# Patient Record
Sex: FEMALE | Race: WHITE | NOT HISPANIC OR LATINO | URBAN - METROPOLITAN AREA
[De-identification: names, ages, dates, MRNs, and addresses within clinical notes are randomized per-mention and may not be internally consistent; named-entity substitution may affect disease eponyms.]

---

## 2017-03-25 ENCOUNTER — EMERGENCY (EMERGENCY)
Facility: HOSPITAL | Age: 82
LOS: 1 days | Discharge: HOME | End: 2017-03-25
Admitting: EMERGENCY MEDICINE
Payer: MEDICARE

## 2017-03-25 VITALS
DIASTOLIC BLOOD PRESSURE: 93 MMHG | OXYGEN SATURATION: 100 % | RESPIRATION RATE: 18 BRPM | SYSTOLIC BLOOD PRESSURE: 154 MMHG | TEMPERATURE: 98 F | HEART RATE: 82 BPM

## 2017-03-25 PROBLEM — Z00.00 ENCOUNTER FOR PREVENTIVE HEALTH EXAMINATION: Status: ACTIVE | Noted: 2017-03-25

## 2017-03-25 PROCEDURE — 93971 EXTREMITY STUDY: CPT | Mod: 26,LT

## 2017-03-25 PROCEDURE — 73562 X-RAY EXAM OF KNEE 3: CPT | Mod: 26,LT

## 2017-03-25 PROCEDURE — 99284 EMERGENCY DEPT VISIT MOD MDM: CPT

## 2017-03-25 RX ORDER — ACETAMINOPHEN 500 MG
650 TABLET ORAL ONCE
Qty: 0 | Refills: 0 | Status: COMPLETED | OUTPATIENT
Start: 2017-03-25 | End: 2017-03-25

## 2017-03-25 RX ADMIN — Medication 650 MILLIGRAM(S): at 09:18

## 2017-03-25 NOTE — ED PROVIDER NOTE - CHPI ED SYMPTOMS NEG
no vomiting/no loss of consciousness/no nausea/Denies LOC, abd pain, dizziness, CP, and other complaints Denies heavy lifting, trauma, SOB, chills, fevers, recent travel, and other complaints

## 2017-03-25 NOTE — ED ADULT TRIAGE NOTE - CHIEF COMPLAINT QUOTE
Co left knee pain and swelling since last night. Denies falls or trauma. Unable to bear weight on leg. On Eliquis

## 2017-03-25 NOTE — ED PROVIDER NOTE - MEDICAL DECISION MAKING DETAILS
84 y/o F s/p slip and fall here w/ head injury and x6cm lac to scalp. Plan - CT head and C-spine, lac repair. atraumatic left knee pain/ swelling: xray r/o fracture or US r/o blood clot.

## 2017-03-25 NOTE — ED PROVIDER NOTE - NS ED MD SCRIBE ATTENDING SCRIBE SECTIONS
VITAL SIGNS( Pullset)/DISPOSITION/HISTORY OF PRESENT ILLNESS/PAST MEDICAL/SURGICAL/SOCIAL HISTORY/HIV/REVIEW OF SYSTEMS PAST MEDICAL/SURGICAL/SOCIAL HISTORY/DISPOSITION PAST MEDICAL/SURGICAL/SOCIAL HISTORY/VITAL SIGNS( Pullset)/DISPOSITION/HISTORY OF PRESENT ILLNESS/HIV/REVIEW OF SYSTEMS/PHYSICAL EXAM

## 2017-03-25 NOTE — ED PROVIDER NOTE - LOWER EXTREMITY EXAM, LEFT
SWELLING/LIMITED ROM/positive swelling and tenderness to left lateral area of knee, slight pain on ROM, positive 2+ distal pulses, less than 2 sec cap refill, 5/5 strength. ambulating with difficulty. no signs of infection, no redness, no warmth, positive swelling and tenderness to left lateral area of knee, slight pain on ROM, positive 2+ distal pulses, less than 2 sec cap refill, 5/5 strength. ambulating with difficulty./LIMITED ROM/SWELLING

## 2017-03-25 NOTE — ED PROVIDER NOTE - OBJECTIVE STATEMENT
84 y/o F, w/ DM, HTN, Osteoporosis, presents to ED s/p slip and fall today. Reports her R knee felt weak and gave out causing the fall but notes chronic R knee pain. Endorses head trauma when she fell. States she hit her head against a dresser during the fall. Has lac on her scalp presently. Pt ambulating normally s/p fall. Denies LOC, abd pain, dizziness, CP, nausea, vomiting, and other complaints. NKDA. Regular meds include HTN med and DM med. Tetanus status unknown. 82 y/o F, w/ PMHx of CAD, HTN, HLD, Angioplasty, presents to ED c/o L knee pain and swelling worsening since yesterday night. This AM she noticed she could not straighten the leg. Reports she is having difficulty ambulating secondary to pain/swelling. Recalls no mechanisms of injury. Denies heavy lifting, trauma, SOB, chills, fevers, recent travel, and other complaints. Meds include Eliquis, Lisinopril, and Metoprolol. NKDA. 82 y/o F, w/ PMHx of CAD, HTN, HLD, Angioplasty, presents to ED c/o L knee pain and swelling worsening since last night. This AM she noticed she could not straighten the leg. Reports she is having difficulty ambulating secondary to pain/swelling. Recalls no mechanisms of injury. Denies heavy lifting, trauma, SOB, chills, fevers, recent travel, and other complaints. Meds include Eliquis, Lisinopril, and Metoprolol. NKDA.

## 2017-03-25 NOTE — ED PROVIDER NOTE - PROGRESS NOTE DETAILS
The scribe's documentation has been prepared under my direction and personally reviewed by me in its entirety. I confirm that the note above accurately reflects all work, treatment, procedures, and medical decision making performed by me. PALOMA Jarquin us and xray showing small effusion, no other acute findings. Pt stable for discharge and to follow up with pmd.

## 2017-03-25 NOTE — ED PROVIDER NOTE - CHIEF COMPLAINT
The patient is a 83y Female complaining of head trauma. The patient is a 83y Female complaining of The patient is a 83y Female complaining of L knee pain.

## 2017-03-25 NOTE — ED PROCEDURE NOTE - NS ED PERI VASCULAR NEG
fingers/toes warm to touch/no cyanosis of extremity/no swelling/capillary refill time < 2 seconds/no paresthesia

## 2017-04-12 ENCOUNTER — INPATIENT (INPATIENT)
Facility: HOSPITAL | Age: 82
LOS: 1 days | Discharge: HOME HEALTH | End: 2017-04-14
Attending: GENERAL PRACTICE | Admitting: GENERAL PRACTICE
Payer: MEDICARE

## 2017-04-12 VITALS
OXYGEN SATURATION: 87 % | TEMPERATURE: 99 F | HEART RATE: 74 BPM | DIASTOLIC BLOOD PRESSURE: 87 MMHG | RESPIRATION RATE: 20 BRPM | SYSTOLIC BLOOD PRESSURE: 116 MMHG

## 2017-04-12 DIAGNOSIS — I25.10 ATHEROSCLEROTIC HEART DISEASE OF NATIVE CORONARY ARTERY WITHOUT ANGINA PECTORIS: ICD-10-CM

## 2017-04-12 DIAGNOSIS — I48.91 UNSPECIFIED ATRIAL FIBRILLATION: ICD-10-CM

## 2017-04-12 DIAGNOSIS — I10 ESSENTIAL (PRIMARY) HYPERTENSION: ICD-10-CM

## 2017-04-12 DIAGNOSIS — Z29.9 ENCOUNTER FOR PROPHYLACTIC MEASURES, UNSPECIFIED: ICD-10-CM

## 2017-04-12 DIAGNOSIS — R55 SYNCOPE AND COLLAPSE: ICD-10-CM

## 2017-04-12 DIAGNOSIS — E78.5 HYPERLIPIDEMIA, UNSPECIFIED: ICD-10-CM

## 2017-04-12 LAB
ALBUMIN SERPL ELPH-MCNC: 3.3 G/DL — SIGNIFICANT CHANGE UP (ref 3.3–5)
ALP SERPL-CCNC: 69 U/L — SIGNIFICANT CHANGE UP (ref 40–120)
ALT FLD-CCNC: 14 U/L — SIGNIFICANT CHANGE UP (ref 4–33)
APTT BLD: 33.5 SEC — SIGNIFICANT CHANGE UP (ref 27.5–37.4)
AST SERPL-CCNC: 23 U/L — SIGNIFICANT CHANGE UP (ref 4–32)
BASOPHILS # BLD AUTO: 0.02 K/UL — SIGNIFICANT CHANGE UP (ref 0–0.2)
BASOPHILS NFR BLD AUTO: 0.3 % — SIGNIFICANT CHANGE UP (ref 0–2)
BILIRUB SERPL-MCNC: 0.7 MG/DL — SIGNIFICANT CHANGE UP (ref 0.2–1.2)
BUN SERPL-MCNC: 23 MG/DL — SIGNIFICANT CHANGE UP (ref 7–23)
CALCIUM SERPL-MCNC: 8.7 MG/DL — SIGNIFICANT CHANGE UP (ref 8.4–10.5)
CHLORIDE SERPL-SCNC: 105 MMOL/L — SIGNIFICANT CHANGE UP (ref 98–107)
CK MB BLD-MCNC: 1.53 NG/ML — SIGNIFICANT CHANGE UP (ref 1–4.7)
CK SERPL-CCNC: 26 U/L — SIGNIFICANT CHANGE UP (ref 25–170)
CK SERPL-CCNC: 28 U/L — SIGNIFICANT CHANGE UP (ref 25–170)
CO2 SERPL-SCNC: 22 MMOL/L — SIGNIFICANT CHANGE UP (ref 22–31)
CREAT SERPL-MCNC: 1.05 MG/DL — SIGNIFICANT CHANGE UP (ref 0.5–1.3)
EOSINOPHIL # BLD AUTO: 0.06 K/UL — SIGNIFICANT CHANGE UP (ref 0–0.5)
EOSINOPHIL NFR BLD AUTO: 0.8 % — SIGNIFICANT CHANGE UP (ref 0–6)
GLUCOSE SERPL-MCNC: 119 MG/DL — HIGH (ref 70–99)
HCT VFR BLD CALC: 38.1 % — SIGNIFICANT CHANGE UP (ref 34.5–45)
HCT VFR BLD CALC: 38.7 % — SIGNIFICANT CHANGE UP (ref 34.5–45)
HGB BLD-MCNC: 11.4 G/DL — LOW (ref 11.5–15.5)
HGB BLD-MCNC: 11.5 G/DL — SIGNIFICANT CHANGE UP (ref 11.5–15.5)
IMM GRANULOCYTES NFR BLD AUTO: 0.3 % — SIGNIFICANT CHANGE UP (ref 0–1.5)
INR BLD: 1.24 — HIGH (ref 0.88–1.17)
LYMPHOCYTES # BLD AUTO: 1.24 K/UL — SIGNIFICANT CHANGE UP (ref 1–3.3)
LYMPHOCYTES # BLD AUTO: 16.1 % — SIGNIFICANT CHANGE UP (ref 13–44)
MCHC RBC-ENTMCNC: 22.5 PG — LOW (ref 27–34)
MCHC RBC-ENTMCNC: 22.9 PG — LOW (ref 27–34)
MCHC RBC-ENTMCNC: 29.7 % — LOW (ref 32–36)
MCHC RBC-ENTMCNC: 29.9 % — LOW (ref 32–36)
MCV RBC AUTO: 75.9 FL — LOW (ref 80–100)
MCV RBC AUTO: 76.5 FL — LOW (ref 80–100)
MONOCYTES # BLD AUTO: 0.91 K/UL — HIGH (ref 0–0.9)
MONOCYTES NFR BLD AUTO: 11.8 % — SIGNIFICANT CHANGE UP (ref 2–14)
NEUTROPHILS # BLD AUTO: 5.43 K/UL — SIGNIFICANT CHANGE UP (ref 1.8–7.4)
NEUTROPHILS NFR BLD AUTO: 70.7 % — SIGNIFICANT CHANGE UP (ref 43–77)
OB PNL STL: NEGATIVE — SIGNIFICANT CHANGE UP
PLATELET # BLD AUTO: 219 K/UL — SIGNIFICANT CHANGE UP (ref 150–400)
PLATELET # BLD AUTO: 234 K/UL — SIGNIFICANT CHANGE UP (ref 150–400)
PMV BLD: 10.2 FL — SIGNIFICANT CHANGE UP (ref 7–13)
PMV BLD: 10.7 FL — SIGNIFICANT CHANGE UP (ref 7–13)
POTASSIUM SERPL-MCNC: 4 MMOL/L — SIGNIFICANT CHANGE UP (ref 3.5–5.3)
POTASSIUM SERPL-SCNC: 4 MMOL/L — SIGNIFICANT CHANGE UP (ref 3.5–5.3)
PROT SERPL-MCNC: 6.5 G/DL — SIGNIFICANT CHANGE UP (ref 6–8.3)
PROTHROM AB SERPL-ACNC: 14 SEC — HIGH (ref 9.8–13.1)
RBC # BLD: 4.98 M/UL — SIGNIFICANT CHANGE UP (ref 3.8–5.2)
RBC # BLD: 5.1 M/UL — SIGNIFICANT CHANGE UP (ref 3.8–5.2)
RBC # FLD: 21.3 % — HIGH (ref 10.3–14.5)
RBC # FLD: 21.6 % — HIGH (ref 10.3–14.5)
SODIUM SERPL-SCNC: 141 MMOL/L — SIGNIFICANT CHANGE UP (ref 135–145)
TROPONIN T SERPL-MCNC: < 0.06 NG/ML — SIGNIFICANT CHANGE UP (ref 0–0.06)
TROPONIN T SERPL-MCNC: < 0.06 NG/ML — SIGNIFICANT CHANGE UP (ref 0–0.06)
WBC # BLD: 7.68 K/UL — SIGNIFICANT CHANGE UP (ref 3.8–10.5)
WBC # BLD: 9.69 K/UL — SIGNIFICANT CHANGE UP (ref 3.8–10.5)
WBC # FLD AUTO: 7.68 K/UL — SIGNIFICANT CHANGE UP (ref 3.8–10.5)
WBC # FLD AUTO: 9.69 K/UL — SIGNIFICANT CHANGE UP (ref 3.8–10.5)

## 2017-04-12 PROCEDURE — 71010: CPT | Mod: 26

## 2017-04-12 PROCEDURE — 72125 CT NECK SPINE W/O DYE: CPT | Mod: 26

## 2017-04-12 PROCEDURE — 70450 CT HEAD/BRAIN W/O DYE: CPT | Mod: 26

## 2017-04-12 RX ORDER — FERROUS SULFATE 325(65) MG
325 TABLET ORAL DAILY
Qty: 0 | Refills: 0 | Status: DISCONTINUED | OUTPATIENT
Start: 2017-04-12 | End: 2017-04-14

## 2017-04-12 RX ORDER — METOPROLOL TARTRATE 50 MG
50 TABLET ORAL DAILY
Qty: 0 | Refills: 0 | Status: DISCONTINUED | OUTPATIENT
Start: 2017-04-12 | End: 2017-04-14

## 2017-04-12 RX ORDER — TETANUS TOXOID, REDUCED DIPHTHERIA TOXOID AND ACELLULAR PERTUSSIS VACCINE, ADSORBED 5; 2.5; 8; 8; 2.5 [IU]/.5ML; [IU]/.5ML; UG/.5ML; UG/.5ML; UG/.5ML
0.5 SUSPENSION INTRAMUSCULAR ONCE
Qty: 0 | Refills: 0 | Status: COMPLETED | OUTPATIENT
Start: 2017-04-12 | End: 2017-04-12

## 2017-04-12 RX ORDER — LISINOPRIL 2.5 MG/1
20 TABLET ORAL DAILY
Qty: 0 | Refills: 0 | Status: DISCONTINUED | OUTPATIENT
Start: 2017-04-12 | End: 2017-04-14

## 2017-04-12 RX ORDER — DILTIAZEM HCL 120 MG
180 CAPSULE, EXT RELEASE 24 HR ORAL DAILY
Qty: 0 | Refills: 0 | Status: DISCONTINUED | OUTPATIENT
Start: 2017-04-12 | End: 2017-04-14

## 2017-04-12 RX ORDER — PANTOPRAZOLE SODIUM 20 MG/1
40 TABLET, DELAYED RELEASE ORAL
Qty: 0 | Refills: 0 | Status: DISCONTINUED | OUTPATIENT
Start: 2017-04-12 | End: 2017-04-14

## 2017-04-12 RX ORDER — DOCUSATE SODIUM 100 MG
100 CAPSULE ORAL THREE TIMES A DAY
Qty: 0 | Refills: 0 | Status: DISCONTINUED | OUTPATIENT
Start: 2017-04-12 | End: 2017-04-14

## 2017-04-12 RX ORDER — APIXABAN 2.5 MG/1
2.5 TABLET, FILM COATED ORAL
Qty: 0 | Refills: 0 | Status: DISCONTINUED | OUTPATIENT
Start: 2017-04-12 | End: 2017-04-14

## 2017-04-12 RX ADMIN — PANTOPRAZOLE SODIUM 40 MILLIGRAM(S): 20 TABLET, DELAYED RELEASE ORAL at 15:16

## 2017-04-12 RX ADMIN — Medication 325 MILLIGRAM(S): at 15:17

## 2017-04-12 RX ADMIN — APIXABAN 2.5 MILLIGRAM(S): 2.5 TABLET, FILM COATED ORAL at 15:16

## 2017-04-12 RX ADMIN — TETANUS TOXOID, REDUCED DIPHTHERIA TOXOID AND ACELLULAR PERTUSSIS VACCINE, ADSORBED 0.5 MILLILITER(S): 5; 2.5; 8; 8; 2.5 SUSPENSION INTRAMUSCULAR at 10:30

## 2017-04-12 RX ADMIN — Medication 50 MILLIGRAM(S): at 15:17

## 2017-04-12 RX ADMIN — Medication 180 MILLIGRAM(S): at 15:17

## 2017-04-12 NOTE — H&P ADULT. - HISTORY OF PRESENT ILLNESS
84 y/o F with PMHx HTN, HLD, Afib on Eloquis, CAD s/p 1 stent presents with dizziness and fall last night. She was sitting down watching TV when she suddenly started feeling dizzy, perioral and b/l hand numbness. She decided to get up and walk to fight it off, but wound up falling and hitting her head and left leg on furniture. She was able to stand up and call 911 after the episode. Pt had a right orbital headache characterized as a dull-ache and 5/10 severity after the fall, has resolved now. Pt also reports blurry vision currently and during the episode. Pt states that she also has new onset of dyspnea and chest tightness 5/10 on exertion when she goes up a flight of stair and does simple chores in her house that has been occuring for one month. Pt states that these symptoms resolve with rest and they were not the cause of her dizziness and fall. Pt currently denies LOC, syncope, facial droop and numbness, fever, chills, headache, nausea, vomiting, and diarrhea. 84 y/o F with PMHx HTN, HLD, Afib on Eliquis, CAD s/p 1 stent presents with dizziness and fall last night. She was sitting down watching TV when she suddenly started feeling dizzy, with perioral and b/l hand numbness. She decided to get up and walk to fight it off, but wound up falling and hitting her head and left leg on furniture. She was able to stand up and call 911 after the episode. Pt had a right orbital headache characterized as a dull-ache and 5/10 severity after the fall, has resolved now. Pt also reports blurry vision currently and during the episode. Pt states that she also has new onset of dyspnea and chest tightness 5/10 on exertion when she goes up a flight of stair and does simple chores in her house that has been occuring for one month. Pt states that these symptoms resolve with rest and they were not the cause of her dizziness and fall. Pt currently denies LOC, syncope, facial droop and numbness, fever, chills, headache, nausea, vomiting, and diarrhea. 82 y/o F with PMHx HTN, HLD, Afib on Eliquis, CAD s/p 1 stent presents with dizziness and fall last night. She was sitting down watching TV when she suddenly started feeling dizzy, with perioral and b/l hand numbness.   She decided to get up and walk to fight it off, but wound up falling and hitting her head and left leg on furniture. She was able to stand up and call 911 after the episode. Pt had a right orbital headache characterized as a dull-ache and 5/10 severity after the fall, has resolved now.   Pt also reports blurry vision currently and during the episode. Pt states that she also has new onset of dyspnea and chest tightness 5/10 on exertion when she goes up a flight of stair and does simple chores in her house that has been occuring for one month. Pt states that these symptoms resolve with rest and they were not the cause of her dizziness and fall.   Pt currently denies LOC, syncope, facial droop and numbness, fever, chills, headache, nausea, vomiting, and diarrhea.

## 2017-04-12 NOTE — H&P ADULT. - ASSESSMENT
82 y/o F with PMHx HTN, HLD, Afib on Eloquis, CAD s/p 1 stent presents with dizziness and fall last night, admitted to tele r/o stroke r/o ACS 84 y/o F with PMHx HTN, HLD, Afib on Eloquis, CAD s/p 1 stent presents with dizziness and fall last night, admitted to tele for possible Syncopal episode, also c/o MCMILLAN & chest tightness x 1 month

## 2017-04-12 NOTE — ED PROVIDER NOTE - PROGRESS NOTE DETAILS
On reassessment pt noted an episode of facial/hand numbness/tingling just prior to the presyncope episode. Will consult neuro.

## 2017-04-12 NOTE — H&P ADULT. - NEGATIVE ENMT SYMPTOMS
no sinus symptoms/no nasal discharge/no hearing difficulty/no ear pain/no dysphagia/no tinnitus/no throat pain/no nasal congestion

## 2017-04-12 NOTE — H&P ADULT. - ATTENDING COMMENTS
Patient seen, examined, and interviewed by me, case discussed with me, chart reviewed, agree with above H/P as reviewed and edited by me.

## 2017-04-12 NOTE — ED ADULT TRIAGE NOTE - CHIEF COMPLAINT QUOTE
pt states "I am dizzy I fell in my home today I hit my head and hurt my left leg"  pt denies any loc abrasion noted to left leg pt history of a-fib on eloquis history of cardiac stents

## 2017-04-12 NOTE — H&P ADULT. - PMH
Atrial fibrillation    Coronary Artery Disease    Hyperlipemia    Hypertension    s/p Angioplasty with Stent

## 2017-04-12 NOTE — H&P ADULT. - MUSCULOSKELETAL
details… detailed exam no joint swelling/normal strength/no calf tenderness/no joint erythema/no joint warmth/ROM intact/normal

## 2017-04-12 NOTE — H&P ADULT. - GASTROINTESTINAL DETAILS
normal/no organomegaly/no masses palpable/bowel sounds normal/no distention/nontender/soft/no rigidity/no guarding/no rebound tenderness

## 2017-04-12 NOTE — ED PROVIDER NOTE - OBJECTIVE STATEMENT
84yo hx of htn, hld, afib on eloquis, cad s/p stent presents with lightheadednesss and fall today. She was ambulating in her house, felt lightheaded, tried to walk, got worsening lighteadedness and fell, hitting her head, and right leg. No loc, confusion or amnesia. She did have a headache since the fall. Her head hit a chair. No presyncope headache abd pain, chest pain or sob. She was ambulatory after the incident. 82yo hx of htn, hld, afib on eloquis, cad s/p stent presents with lightheadednesss and fall today. She was ambulating in her house, felt lightheaded, tried to walk, got worsening lighteadedness and fell, hitting her head, and right leg. No loc, confusion or amnesia. She did have a headache since the fall. Her head hit a chair. No presyncope headache abd pain, chest pain or sob. She was ambulatory after the incident. She notes mild anemia and started on Iron but reports dark stools. No associated abd pain. She notes a similar episode 1 week ago.

## 2017-04-12 NOTE — H&P ADULT. - NEUROLOGICAL DETAILS
normal strength/responds to pain/sensation intact/cranial nerves intact/no spontaneous movement/alert and oriented x 3/responds to verbal commands

## 2017-04-12 NOTE — H&P ADULT. - PROBLEM SELECTOR PLAN 1
Admit to Telemetry, serial CE's, EKG's, check orthostatics, echocardiogram. F/U MD note, consider Cardiology eval Admit to Telemetry, serial CE's, EKG's, check orthostatics, echocardiogram.    Cardiology eval ( Dr So contacted)  neuro eval (called by ED)

## 2017-04-12 NOTE — H&P ADULT. - RS GEN PE MLT RESP DETAILS PC
no chest wall tenderness/airway patent/no subcutaneous emphysema/clear to auscultation bilaterally/normal/no intercostal retractions/no rales/no wheezes/breath sounds equal/respirations non-labored/good air movement/no rhonchi

## 2017-04-12 NOTE — H&P ADULT. - NEGATIVE CARDIOVASCULAR SYMPTOMS
no palpitations/no orthopnea/no paroxysmal nocturnal dyspnea/no claudication/no chest pain/no peripheral edema

## 2017-04-12 NOTE — H&P ADULT. - NEGATIVE NEUROLOGICAL SYMPTOMS
no confusion/no focal seizures/no syncope/no generalized seizures/no transient paralysis/no weakness/no tremors/no paresthesias/no loss of consciousness/no hemiparesis/no headache/no facial palsy

## 2017-04-12 NOTE — H&P ADULT. - NEGATIVE GASTROINTESTINAL SYMPTOMS
no diarrhea/no change in bowel habits/no hematochezia/no abdominal pain/no melena/no vomiting/no nausea

## 2017-04-12 NOTE — ED PROVIDER NOTE - MEDICAL DECISION MAKING DETAILS
pt with multiple cardiac problems presents sp fall and presyncopal episode, concerning for cardiac etiology, but will also check electrolytes, guiac and reassess. Will d/w primary cardiologist. Will do ct head/cspine, cxr, update tetanus.

## 2017-04-12 NOTE — ED ADULT NURSE NOTE - OBJECTIVE STATEMENT
Pt on Eliquis for Afib c/o mild left sided face numbness and left hand numbness this morning. Pt then decided to "walk it off," became dizzy, was unable to hold herself up and fell, denies LOC. Abrasions/skin tears noted to left knee. Pt now also c/o b/l blurry vision. PERRLA, denies chest pain, SOB Pt on Eliquis for Afib c/o mild left sided face numbness and left hand numbness this morning. Pt then decided to "walk it off," became dizzy, was unable to hold herself up and fell hitting her knees and back of the head, denies LOC. Abrasions/skin tears noted to left knee. Pt now also c/o b/l blurry vision and headache on right side. PERRLA, denies chest pain, SOB

## 2017-04-13 ENCOUNTER — TRANSCRIPTION ENCOUNTER (OUTPATIENT)
Age: 82
End: 2017-04-13

## 2017-04-13 LAB
ALBUMIN SERPL ELPH-MCNC: 3.4 G/DL — SIGNIFICANT CHANGE UP (ref 3.3–5)
ALP SERPL-CCNC: 71 U/L — SIGNIFICANT CHANGE UP (ref 40–120)
ALT FLD-CCNC: 14 U/L — SIGNIFICANT CHANGE UP (ref 4–33)
AST SERPL-CCNC: 24 U/L — SIGNIFICANT CHANGE UP (ref 4–32)
BASOPHILS # BLD AUTO: 0.03 K/UL — SIGNIFICANT CHANGE UP (ref 0–0.2)
BASOPHILS NFR BLD AUTO: 0.4 % — SIGNIFICANT CHANGE UP (ref 0–2)
BILIRUB SERPL-MCNC: 1.1 MG/DL — SIGNIFICANT CHANGE UP (ref 0.2–1.2)
BUN SERPL-MCNC: 18 MG/DL — SIGNIFICANT CHANGE UP (ref 7–23)
CALCIUM SERPL-MCNC: 8.8 MG/DL — SIGNIFICANT CHANGE UP (ref 8.4–10.5)
CHLORIDE SERPL-SCNC: 100 MMOL/L — SIGNIFICANT CHANGE UP (ref 98–107)
CHOLEST SERPL-MCNC: 139 MG/DL — SIGNIFICANT CHANGE UP (ref 120–199)
CK SERPL-CCNC: 31 U/L — SIGNIFICANT CHANGE UP (ref 25–170)
CO2 SERPL-SCNC: 25 MMOL/L — SIGNIFICANT CHANGE UP (ref 22–31)
CREAT SERPL-MCNC: 0.85 MG/DL — SIGNIFICANT CHANGE UP (ref 0.5–1.3)
EOSINOPHIL # BLD AUTO: 0.08 K/UL — SIGNIFICANT CHANGE UP (ref 0–0.5)
EOSINOPHIL NFR BLD AUTO: 1 % — SIGNIFICANT CHANGE UP (ref 0–6)
GLUCOSE SERPL-MCNC: 105 MG/DL — HIGH (ref 70–99)
HBA1C BLD-MCNC: 6 % — HIGH (ref 4–5.6)
HCT VFR BLD CALC: 36.1 % — SIGNIFICANT CHANGE UP (ref 34.5–45)
HDLC SERPL-MCNC: 38 MG/DL — LOW (ref 45–65)
HGB BLD-MCNC: 10.9 G/DL — LOW (ref 11.5–15.5)
IMM GRANULOCYTES NFR BLD AUTO: 0.4 % — SIGNIFICANT CHANGE UP (ref 0–1.5)
LIPID PNL WITH DIRECT LDL SERPL: 84 MG/DL — SIGNIFICANT CHANGE UP
LYMPHOCYTES # BLD AUTO: 1.68 K/UL — SIGNIFICANT CHANGE UP (ref 1–3.3)
LYMPHOCYTES # BLD AUTO: 22 % — SIGNIFICANT CHANGE UP (ref 13–44)
MAGNESIUM SERPL-MCNC: 1.8 MG/DL — SIGNIFICANT CHANGE UP (ref 1.6–2.6)
MCHC RBC-ENTMCNC: 22.8 PG — LOW (ref 27–34)
MCHC RBC-ENTMCNC: 30.2 % — LOW (ref 32–36)
MCV RBC AUTO: 75.5 FL — LOW (ref 80–100)
MONOCYTES # BLD AUTO: 0.82 K/UL — SIGNIFICANT CHANGE UP (ref 0–0.9)
MONOCYTES NFR BLD AUTO: 10.8 % — SIGNIFICANT CHANGE UP (ref 2–14)
NEUTROPHILS # BLD AUTO: 4.98 K/UL — SIGNIFICANT CHANGE UP (ref 1.8–7.4)
NEUTROPHILS NFR BLD AUTO: 65.4 % — SIGNIFICANT CHANGE UP (ref 43–77)
PHOSPHATE SERPL-MCNC: 2.6 MG/DL — SIGNIFICANT CHANGE UP (ref 2.5–4.5)
PLATELET # BLD AUTO: 222 K/UL — SIGNIFICANT CHANGE UP (ref 150–400)
PMV BLD: 10.3 FL — SIGNIFICANT CHANGE UP (ref 7–13)
POTASSIUM SERPL-MCNC: 3.9 MMOL/L — SIGNIFICANT CHANGE UP (ref 3.5–5.3)
POTASSIUM SERPL-SCNC: 3.9 MMOL/L — SIGNIFICANT CHANGE UP (ref 3.5–5.3)
PROT SERPL-MCNC: 6.5 G/DL — SIGNIFICANT CHANGE UP (ref 6–8.3)
RBC # BLD: 4.78 M/UL — SIGNIFICANT CHANGE UP (ref 3.8–5.2)
RBC # FLD: 21.4 % — HIGH (ref 10.3–14.5)
SODIUM SERPL-SCNC: 140 MMOL/L — SIGNIFICANT CHANGE UP (ref 135–145)
TRIGL SERPL-MCNC: 99 MG/DL — SIGNIFICANT CHANGE UP (ref 10–149)
TROPONIN T SERPL-MCNC: < 0.06 NG/ML — SIGNIFICANT CHANGE UP (ref 0–0.06)
TSH SERPL-MCNC: 2.46 UIU/ML — SIGNIFICANT CHANGE UP (ref 0.27–4.2)
WBC # BLD: 7.62 K/UL — SIGNIFICANT CHANGE UP (ref 3.8–10.5)
WBC # FLD AUTO: 7.62 K/UL — SIGNIFICANT CHANGE UP (ref 3.8–10.5)

## 2017-04-13 PROCEDURE — 99223 1ST HOSP IP/OBS HIGH 75: CPT

## 2017-04-13 PROCEDURE — 70548 MR ANGIOGRAPHY NECK W/DYE: CPT | Mod: 26

## 2017-04-13 PROCEDURE — 93880 EXTRACRANIAL BILAT STUDY: CPT | Mod: 26

## 2017-04-13 PROCEDURE — 70551 MRI BRAIN STEM W/O DYE: CPT | Mod: 26

## 2017-04-13 RX ORDER — ASPIRIN/CALCIUM CARB/MAGNESIUM 324 MG
81 TABLET ORAL DAILY
Qty: 0 | Refills: 0 | Status: DISCONTINUED | OUTPATIENT
Start: 2017-04-13 | End: 2017-04-13

## 2017-04-13 RX ORDER — ATORVASTATIN CALCIUM 80 MG/1
40 TABLET, FILM COATED ORAL AT BEDTIME
Qty: 0 | Refills: 0 | Status: DISCONTINUED | OUTPATIENT
Start: 2017-04-13 | End: 2017-04-14

## 2017-04-13 RX ADMIN — PANTOPRAZOLE SODIUM 40 MILLIGRAM(S): 20 TABLET, DELAYED RELEASE ORAL at 07:29

## 2017-04-13 RX ADMIN — LISINOPRIL 20 MILLIGRAM(S): 2.5 TABLET ORAL at 05:26

## 2017-04-13 RX ADMIN — Medication 100 MILLIGRAM(S): at 21:23

## 2017-04-13 RX ADMIN — APIXABAN 2.5 MILLIGRAM(S): 2.5 TABLET, FILM COATED ORAL at 05:26

## 2017-04-13 RX ADMIN — Medication 81 MILLIGRAM(S): at 13:24

## 2017-04-13 RX ADMIN — ATORVASTATIN CALCIUM 40 MILLIGRAM(S): 80 TABLET, FILM COATED ORAL at 21:22

## 2017-04-13 RX ADMIN — Medication 180 MILLIGRAM(S): at 05:26

## 2017-04-13 RX ADMIN — Medication 325 MILLIGRAM(S): at 13:25

## 2017-04-13 RX ADMIN — Medication 100 MILLIGRAM(S): at 05:26

## 2017-04-13 RX ADMIN — Medication 50 MILLIGRAM(S): at 05:27

## 2017-04-13 RX ADMIN — APIXABAN 2.5 MILLIGRAM(S): 2.5 TABLET, FILM COATED ORAL at 18:05

## 2017-04-13 NOTE — PHYSICAL THERAPY INITIAL EVALUATION ADULT - PERTINENT HX OF CURRENT PROBLEM, REHAB EVAL
She decided to get up and walk to fight it off, but wound up falling and hitting her head and left leg on furniture. She was able to stand up and call 911 after the episode. Pt had a right orbital headache characterized as a dull-ache and 5/10 severity after the fall, has resolved now.

## 2017-04-13 NOTE — OCCUPATIONAL THERAPY INITIAL EVALUATION ADULT - ANTICIPATED DISCHARGE DISPOSITION, OT EVAL
Anticipate Home with OT. Recommend pt. to follow up with vision specialist/neuro ophthalmologist. Please continue to follow progress notes closely.

## 2017-04-13 NOTE — OCCUPATIONAL THERAPY INITIAL EVALUATION ADULT - DIAGNOSIS, OT EVAL
CVA; Mild LUE weakness; L Hemianopsia; Decreased standing balance; Decreased functional mobility; Decreased ADL management

## 2017-04-13 NOTE — DISCHARGE NOTE ADULT - NS AS DC STROKE ED MATERIALS
Prescribed Medications/Stroke Warning Signs and Symptoms/Need for Followup After Discharge/Stroke Education Booklet/Call 911 for Stroke/Risk Factors for Stroke

## 2017-04-13 NOTE — OCCUPATIONAL THERAPY INITIAL EVALUATION ADULT - LIVES WITH, PROFILE
Pt. reports she lives alone in a house with 5 steps to enter. Once inside, pt. reports she has full flight of steps to negotiate to reach the 2nd floor where main bedroom and bathroom are located. Per pt., she has a bathtub in her bathroom.

## 2017-04-13 NOTE — OCCUPATIONAL THERAPY INITIAL EVALUATION ADULT - PERTINENT HX OF CURRENT PROBLEM, REHAB EVAL
Pt is a 83 year old F with PMHx of HTN, HLD, Afib on Eliquis, CAD s/p 1 stent, who presents to [facility] on 4/12/17 with dizziness & fall last night. She was sitting down watching TV when she suddenly started feeling dizzy, with perioral & B/L hand numbness. She decided to get up & walk to fight it off, but wound up falling and hitting her head and left leg on furniture. She was able to stand up and call 911 after the episode. MRI Head w/o Contrast on 4/13/17 displays acute infarcts.

## 2017-04-13 NOTE — DISCHARGE NOTE ADULT - PATIENT PORTAL LINK FT
“You can access the Follow[facility] Patient Portal, offered by [facility], by registering with the following website: [email or web address]”

## 2017-04-13 NOTE — DISCHARGE NOTE ADULT - CARE PROVIDER_API CALL
Gino Masters), Cardiovascular Disease; Internal Medicine  [address] [place] [address]  Phone: [phone]  Fax: [phone]

## 2017-04-13 NOTE — DISCHARGE NOTE ADULT - CARE PLAN
Principal Discharge DX:	Stroke  Secondary Diagnosis:	Hyperlipemia  Secondary Diagnosis:	Hypertension  Secondary Diagnosis:	Coronary Artery Disease Principal Discharge DX:	Stroke  Goal:	continue eliquis for atrial fibrillation  Instructions for follow-up, activity and diet:	cont  Secondary Diagnosis:	Hyperlipemia  Instructions for follow-up, activity and diet:	Low salt low cholesterol diet   continue medication - follow up with your doctor for cholesterol levels  Secondary Diagnosis:	Hypertension  Instructions for follow-up, activity and diet:	-low salt diet  - monitor blood pressure closely- please call primary care doctor for follow up appointment   - continue medications  Secondary Diagnosis:	Coronary Artery Disease

## 2017-04-13 NOTE — OCCUPATIONAL THERAPY INITIAL EVALUATION ADULT - PLANNED THERAPY INTERVENTIONS, OT EVAL
balance training/cognitive, visual perceptual/ADL retraining/bed mobility training/transfer training/strengthening/ROM

## 2017-04-13 NOTE — OCCUPATIONAL THERAPY INITIAL EVALUATION ADULT - MD ORDER
Occupational Therapy to evaluate and treat. Occupational Therapy to evaluate and treat. Ambulate with assistance. Per JEANETTE Johnson, pt is okay to participate in OT evaluation and perform activity as tolerated.

## 2017-04-13 NOTE — DISCHARGE NOTE ADULT - HOSPITAL COURSE
84 yo F p/w fall, dizziness last night, and MCMILLAN & CP on exertion x 1month     Monitor pt's CBC, pt noted to have BRBPR while straining in bathroom in ED- per RN pt has a hemorrhoid    + Acute CVA    EKG- Afib @ 104  CXR- clear lungs  CT Head- No acute intracranial pathology is noted  CT C-spine- No evidence for acute cervical spine fracture. Degenerative changes.  CE neg x2  Guiac neg    4/12 Neuro: MRI/A H&N, TTE, HbA1C, Lipid panel, PT/OT, S&S, cont eliquis for now  4/13 CD: 	Minimal heterogenous plaque noted within the proximal right and left internal carotid arteries, consistent with 1-15% stenoses.  No hemodynamically significant stenoses  noted.  4/13 Med: Neuro appreciated, Cardio f/u as needed, ASA added-neuro for statin; neuro f/u for optimization, PT/OT, fall precautions, cont current rx, dispo pending PT/OT 84 yo F p/w fall, dizziness last night, and MCMILLAN & CP on exertion x 1month     Monitor pt's CBC, pt noted to have BRBPR while straining in bathroom in ED- per RN pt has a hemorrhoid    + Acute CVA    EKG- Afib @ 104  CXR- clear lungs  CT Head- No acute intracranial pathology is noted  CT C-spine- No evidence for acute cervical spine fracture. Degenerative changes.  CE neg x2  Guiac neg    4/12 Neuro: MRI/A H&N, TTE, HbA1C, Lipid panel, PT/OT, S&S, cont eliquis for now  4/13 CD: 	Minimal heterogenous plaque noted within the proximal right and left internal carotid arteries, consistent with 1-15% stenoses.  No hemodynamically significant stenoses  noted.  MRI positive for acute CVA. As per neurology likely due to non compliance with eliquis for atrial fibrillation . No indication for plavix and aspirin at this time . Stable for discharge home with home care on eliquis . Follow up with Dr. Recinos and Dr. Martínez

## 2017-04-13 NOTE — DISCHARGE NOTE ADULT - PLAN OF CARE
Low salt low cholesterol diet   continue medication - follow up with your doctor for cholesterol levels -low salt diet  - monitor blood pressure closely- please call primary care doctor for follow up appointment   - continue medications continue eliquis for atrial fibrillation cont

## 2017-04-13 NOTE — DISCHARGE NOTE ADULT - MEDICATION SUMMARY - MEDICATIONS TO TAKE
I will START or STAY ON the medications listed below when I get home from the hospital:    lisinopril 20 mg oral tablet  -- 1 tab(s) by mouth once a day  -- Indication: For Hypertension    diltiaZEM 180 mg/24 hours oral capsule, extended release  -- 1 cap(s) by mouth once a day  -- Indication: For Hypertension    apixaban 2.5 mg oral tablet  -- 1 tab(s) by mouth 2 times a day  -- Indication: For Atrial fibrillation    atorvastatin 40 mg oral tablet  -- 1 tab(s) by mouth once a day (at bedtime)  -- Indication: For CVA    metoprolol succinate 50 mg oral tablet, extended release  -- 1 tab(s) by mouth once a day  -- Indication: For Hypertension    ferrous sulfate 325 mg (65 mg elemental iron) oral delayed release tablet  -- 1 tab(s) by mouth once a day  -- Indication: For Anemia    docusate sodium 100 mg oral capsule  -- 1 cap(s) by mouth 3 times a day  -- Indication: For Constipation    pantoprazole 40 mg oral delayed release tablet  -- 1 tab(s) by mouth once a day (before a meal)  -- Indication: For GERD

## 2017-04-14 VITALS
TEMPERATURE: 98 F | RESPIRATION RATE: 18 BRPM | OXYGEN SATURATION: 100 % | DIASTOLIC BLOOD PRESSURE: 66 MMHG | SYSTOLIC BLOOD PRESSURE: 136 MMHG | HEART RATE: 70 BPM

## 2017-04-14 LAB
BASOPHILS # BLD AUTO: 0.03 K/UL — SIGNIFICANT CHANGE UP (ref 0–0.2)
BASOPHILS NFR BLD AUTO: 0.5 % — SIGNIFICANT CHANGE UP (ref 0–2)
BUN SERPL-MCNC: 18 MG/DL — SIGNIFICANT CHANGE UP (ref 7–23)
CALCIUM SERPL-MCNC: 8.5 MG/DL — SIGNIFICANT CHANGE UP (ref 8.4–10.5)
CHLORIDE SERPL-SCNC: 101 MMOL/L — SIGNIFICANT CHANGE UP (ref 98–107)
CO2 SERPL-SCNC: 24 MMOL/L — SIGNIFICANT CHANGE UP (ref 22–31)
CREAT SERPL-MCNC: 0.88 MG/DL — SIGNIFICANT CHANGE UP (ref 0.5–1.3)
EOSINOPHIL # BLD AUTO: 0.08 K/UL — SIGNIFICANT CHANGE UP (ref 0–0.5)
EOSINOPHIL NFR BLD AUTO: 1.3 % — SIGNIFICANT CHANGE UP (ref 0–6)
GLUCOSE SERPL-MCNC: 93 MG/DL — SIGNIFICANT CHANGE UP (ref 70–99)
HCT VFR BLD CALC: 35.3 % — SIGNIFICANT CHANGE UP (ref 34.5–45)
HGB BLD-MCNC: 10.5 G/DL — LOW (ref 11.5–15.5)
IMM GRANULOCYTES NFR BLD AUTO: 0.3 % — SIGNIFICANT CHANGE UP (ref 0–1.5)
LYMPHOCYTES # BLD AUTO: 1.47 K/UL — SIGNIFICANT CHANGE UP (ref 1–3.3)
LYMPHOCYTES # BLD AUTO: 24.5 % — SIGNIFICANT CHANGE UP (ref 13–44)
MAGNESIUM SERPL-MCNC: 1.9 MG/DL — SIGNIFICANT CHANGE UP (ref 1.6–2.6)
MCHC RBC-ENTMCNC: 22.7 PG — LOW (ref 27–34)
MCHC RBC-ENTMCNC: 29.7 % — LOW (ref 32–36)
MCV RBC AUTO: 76.4 FL — LOW (ref 80–100)
MONOCYTES # BLD AUTO: 0.67 K/UL — SIGNIFICANT CHANGE UP (ref 0–0.9)
MONOCYTES NFR BLD AUTO: 11.2 % — SIGNIFICANT CHANGE UP (ref 2–14)
NEUTROPHILS # BLD AUTO: 3.72 K/UL — SIGNIFICANT CHANGE UP (ref 1.8–7.4)
NEUTROPHILS NFR BLD AUTO: 62.2 % — SIGNIFICANT CHANGE UP (ref 43–77)
PLATELET # BLD AUTO: 222 K/UL — SIGNIFICANT CHANGE UP (ref 150–400)
PMV BLD: 10.8 FL — SIGNIFICANT CHANGE UP (ref 7–13)
POTASSIUM SERPL-MCNC: 3.8 MMOL/L — SIGNIFICANT CHANGE UP (ref 3.5–5.3)
POTASSIUM SERPL-SCNC: 3.8 MMOL/L — SIGNIFICANT CHANGE UP (ref 3.5–5.3)
RBC # BLD: 4.62 M/UL — SIGNIFICANT CHANGE UP (ref 3.8–5.2)
RBC # FLD: 21.7 % — HIGH (ref 10.3–14.5)
SODIUM SERPL-SCNC: 140 MMOL/L — SIGNIFICANT CHANGE UP (ref 135–145)
WBC # BLD: 5.99 K/UL — SIGNIFICANT CHANGE UP (ref 3.8–10.5)
WBC # FLD AUTO: 5.99 K/UL — SIGNIFICANT CHANGE UP (ref 3.8–10.5)

## 2017-04-14 PROCEDURE — 99233 SBSQ HOSP IP/OBS HIGH 50: CPT

## 2017-04-14 RX ORDER — POTASSIUM CHLORIDE 20 MEQ
40 PACKET (EA) ORAL ONCE
Qty: 0 | Refills: 0 | Status: COMPLETED | OUTPATIENT
Start: 2017-04-14 | End: 2017-04-14

## 2017-04-14 RX ORDER — APIXABAN 2.5 MG/1
1 TABLET, FILM COATED ORAL
Qty: 0 | Refills: 0 | COMMUNITY

## 2017-04-14 RX ORDER — MAGNESIUM OXIDE 400 MG ORAL TABLET 241.3 MG
400 TABLET ORAL
Qty: 0 | Refills: 0 | Status: DISCONTINUED | OUTPATIENT
Start: 2017-04-14 | End: 2017-04-14

## 2017-04-14 RX ORDER — PANTOPRAZOLE SODIUM 20 MG/1
1 TABLET, DELAYED RELEASE ORAL
Qty: 0 | Refills: 0 | COMMUNITY
Start: 2017-04-14

## 2017-04-14 RX ORDER — METOPROLOL TARTRATE 50 MG
1 TABLET ORAL
Qty: 0 | Refills: 0 | COMMUNITY
Start: 2017-04-14

## 2017-04-14 RX ORDER — PANTOPRAZOLE SODIUM 20 MG/1
1 TABLET, DELAYED RELEASE ORAL
Qty: 0 | Refills: 0 | COMMUNITY

## 2017-04-14 RX ORDER — DOCUSATE SODIUM 100 MG
1 CAPSULE ORAL
Qty: 0 | Refills: 0 | COMMUNITY
Start: 2017-04-14

## 2017-04-14 RX ORDER — LISINOPRIL 2.5 MG/1
1 TABLET ORAL
Qty: 0 | Refills: 0 | COMMUNITY

## 2017-04-14 RX ORDER — LISINOPRIL 2.5 MG/1
1 TABLET ORAL
Qty: 0 | Refills: 0 | COMMUNITY
Start: 2017-04-14

## 2017-04-14 RX ORDER — ATORVASTATIN CALCIUM 80 MG/1
1 TABLET, FILM COATED ORAL
Qty: 0 | Refills: 0 | COMMUNITY
Start: 2017-04-14

## 2017-04-14 RX ORDER — APIXABAN 2.5 MG/1
1 TABLET, FILM COATED ORAL
Qty: 0 | Refills: 0 | COMMUNITY
Start: 2017-04-14

## 2017-04-14 RX ORDER — METOPROLOL TARTRATE 50 MG
1 TABLET ORAL
Qty: 0 | Refills: 0 | COMMUNITY

## 2017-04-14 RX ADMIN — APIXABAN 2.5 MILLIGRAM(S): 2.5 TABLET, FILM COATED ORAL at 05:28

## 2017-04-14 RX ADMIN — Medication 180 MILLIGRAM(S): at 05:28

## 2017-04-14 RX ADMIN — PANTOPRAZOLE SODIUM 40 MILLIGRAM(S): 20 TABLET, DELAYED RELEASE ORAL at 07:44

## 2017-04-14 RX ADMIN — Medication 40 MILLIEQUIVALENT(S): at 13:08

## 2017-04-14 RX ADMIN — Medication 100 MILLIGRAM(S): at 05:28

## 2017-04-14 RX ADMIN — Medication 100 MILLIGRAM(S): at 13:08

## 2017-04-14 RX ADMIN — Medication 50 MILLIGRAM(S): at 05:28

## 2017-04-14 RX ADMIN — Medication 325 MILLIGRAM(S): at 13:08

## 2017-04-14 RX ADMIN — MAGNESIUM OXIDE 400 MG ORAL TABLET 400 MILLIGRAM(S): 241.3 TABLET ORAL at 13:08

## 2017-04-14 RX ADMIN — LISINOPRIL 20 MILLIGRAM(S): 2.5 TABLET ORAL at 05:28

## 2018-02-19 ENCOUNTER — INPATIENT (INPATIENT)
Facility: HOSPITAL | Age: 83
LOS: 1 days | Discharge: HOME HEALTH | End: 2018-02-21
Attending: GENERAL PRACTICE | Admitting: GENERAL PRACTICE
Payer: MEDICARE

## 2018-02-19 VITALS
RESPIRATION RATE: 20 BRPM | SYSTOLIC BLOOD PRESSURE: 142 MMHG | DIASTOLIC BLOOD PRESSURE: 110 MMHG | TEMPERATURE: 98 F | OXYGEN SATURATION: 93 % | HEART RATE: 108 BPM

## 2018-02-19 DIAGNOSIS — E78.5 HYPERLIPIDEMIA, UNSPECIFIED: ICD-10-CM

## 2018-02-19 DIAGNOSIS — R06.09 OTHER FORMS OF DYSPNEA: ICD-10-CM

## 2018-02-19 DIAGNOSIS — I10 ESSENTIAL (PRIMARY) HYPERTENSION: ICD-10-CM

## 2018-02-19 DIAGNOSIS — I48.2 CHRONIC ATRIAL FIBRILLATION: ICD-10-CM

## 2018-02-19 DIAGNOSIS — I25.10 ATHEROSCLEROTIC HEART DISEASE OF NATIVE CORONARY ARTERY WITHOUT ANGINA PECTORIS: ICD-10-CM

## 2018-02-19 DIAGNOSIS — Z29.9 ENCOUNTER FOR PROPHYLACTIC MEASURES, UNSPECIFIED: ICD-10-CM

## 2018-02-19 DIAGNOSIS — I50.9 HEART FAILURE, UNSPECIFIED: ICD-10-CM

## 2018-02-19 DIAGNOSIS — I48.91 UNSPECIFIED ATRIAL FIBRILLATION: ICD-10-CM

## 2018-02-19 LAB
ALBUMIN SERPL ELPH-MCNC: 3.3 G/DL — SIGNIFICANT CHANGE UP (ref 3.3–5)
ALP SERPL-CCNC: 87 U/L — SIGNIFICANT CHANGE UP (ref 40–120)
ALT FLD-CCNC: 14 U/L — SIGNIFICANT CHANGE UP (ref 4–33)
APTT BLD: 33.6 SEC — SIGNIFICANT CHANGE UP (ref 27.5–37.4)
AST SERPL-CCNC: 21 U/L — SIGNIFICANT CHANGE UP (ref 4–32)
BASE EXCESS BLDV CALC-SCNC: 1.8 MMOL/L — SIGNIFICANT CHANGE UP
BASOPHILS # BLD AUTO: 0.04 K/UL — SIGNIFICANT CHANGE UP (ref 0–0.2)
BASOPHILS NFR BLD AUTO: 0.5 % — SIGNIFICANT CHANGE UP (ref 0–2)
BILIRUB SERPL-MCNC: 1.2 MG/DL — SIGNIFICANT CHANGE UP (ref 0.2–1.2)
BLOOD GAS VENOUS - CREATININE: 0.88 MG/DL — SIGNIFICANT CHANGE UP (ref 0.5–1.3)
BUN SERPL-MCNC: 17 MG/DL — SIGNIFICANT CHANGE UP (ref 7–23)
CALCIUM SERPL-MCNC: 8.4 MG/DL — SIGNIFICANT CHANGE UP (ref 8.4–10.5)
CHLORIDE BLDV-SCNC: 111 MMOL/L — HIGH (ref 96–108)
CHLORIDE SERPL-SCNC: 106 MMOL/L — SIGNIFICANT CHANGE UP (ref 98–107)
CK MB BLD-MCNC: 3.46 NG/ML — SIGNIFICANT CHANGE UP (ref 1–4.7)
CK MB BLD-MCNC: SIGNIFICANT CHANGE UP (ref 0–2.5)
CK SERPL-CCNC: 46 U/L — SIGNIFICANT CHANGE UP (ref 25–170)
CO2 SERPL-SCNC: 23 MMOL/L — SIGNIFICANT CHANGE UP (ref 22–31)
CREAT SERPL-MCNC: 0.9 MG/DL — SIGNIFICANT CHANGE UP (ref 0.5–1.3)
EOSINOPHIL # BLD AUTO: 0.02 K/UL — SIGNIFICANT CHANGE UP (ref 0–0.5)
EOSINOPHIL NFR BLD AUTO: 0.2 % — SIGNIFICANT CHANGE UP (ref 0–6)
GAS PNL BLDV: 139 MMOL/L — SIGNIFICANT CHANGE UP (ref 136–146)
GLUCOSE BLDV-MCNC: 126 — HIGH (ref 70–99)
GLUCOSE SERPL-MCNC: 124 MG/DL — HIGH (ref 70–99)
HCO3 BLDV-SCNC: 25 MMOL/L — SIGNIFICANT CHANGE UP (ref 20–27)
HCT VFR BLD CALC: 38.8 % — SIGNIFICANT CHANGE UP (ref 34.5–45)
HCT VFR BLDV CALC: 35.2 % — SIGNIFICANT CHANGE UP (ref 34.5–45)
HGB BLD-MCNC: 11.4 G/DL — LOW (ref 11.5–15.5)
HGB BLDV-MCNC: 11.4 G/DL — LOW (ref 11.5–15.5)
IMM GRANULOCYTES # BLD AUTO: 0.06 # — SIGNIFICANT CHANGE UP
IMM GRANULOCYTES NFR BLD AUTO: 0.7 % — SIGNIFICANT CHANGE UP (ref 0–1.5)
INR BLD: 1.58 — HIGH (ref 0.88–1.17)
LACTATE BLDV-MCNC: 2.2 MMOL/L — HIGH (ref 0.5–2)
LYMPHOCYTES # BLD AUTO: 1.16 K/UL — SIGNIFICANT CHANGE UP (ref 1–3.3)
LYMPHOCYTES # BLD AUTO: 14 % — SIGNIFICANT CHANGE UP (ref 13–44)
MCHC RBC-ENTMCNC: 23.4 PG — LOW (ref 27–34)
MCHC RBC-ENTMCNC: 29.4 % — LOW (ref 32–36)
MCV RBC AUTO: 79.7 FL — LOW (ref 80–100)
MONOCYTES # BLD AUTO: 0.7 K/UL — SIGNIFICANT CHANGE UP (ref 0–0.9)
MONOCYTES NFR BLD AUTO: 8.4 % — SIGNIFICANT CHANGE UP (ref 2–14)
NEUTROPHILS # BLD AUTO: 6.32 K/UL — SIGNIFICANT CHANGE UP (ref 1.8–7.4)
NEUTROPHILS NFR BLD AUTO: 76.2 % — SIGNIFICANT CHANGE UP (ref 43–77)
NRBC # FLD: 0 — SIGNIFICANT CHANGE UP
NT-PROBNP SERPL-SCNC: 6486 PG/ML — SIGNIFICANT CHANGE UP
PCO2 BLDV: 40 MMHG — LOW (ref 41–51)
PH BLDV: 7.43 PH — SIGNIFICANT CHANGE UP (ref 7.32–7.43)
PLATELET # BLD AUTO: 291 K/UL — SIGNIFICANT CHANGE UP (ref 150–400)
PMV BLD: 10.2 FL — SIGNIFICANT CHANGE UP (ref 7–13)
PO2 BLDV: < 24 MMHG — LOW (ref 35–40)
POTASSIUM BLDV-SCNC: 3.6 MMOL/L — SIGNIFICANT CHANGE UP (ref 3.4–4.5)
POTASSIUM SERPL-MCNC: 4 MMOL/L — SIGNIFICANT CHANGE UP (ref 3.5–5.3)
POTASSIUM SERPL-SCNC: 4 MMOL/L — SIGNIFICANT CHANGE UP (ref 3.5–5.3)
PROT SERPL-MCNC: 6.7 G/DL — SIGNIFICANT CHANGE UP (ref 6–8.3)
PROTHROM AB SERPL-ACNC: 17.7 SEC — HIGH (ref 9.8–13.1)
RBC # BLD: 4.87 M/UL — SIGNIFICANT CHANGE UP (ref 3.8–5.2)
RBC # FLD: 19.4 % — HIGH (ref 10.3–14.5)
SAO2 % BLDV: 24 % — LOW (ref 60–85)
SODIUM SERPL-SCNC: 144 MMOL/L — SIGNIFICANT CHANGE UP (ref 135–145)
TROPONIN T SERPL-MCNC: < 0.06 NG/ML — SIGNIFICANT CHANGE UP (ref 0–0.06)
TROPONIN T SERPL-MCNC: < 0.06 NG/ML — SIGNIFICANT CHANGE UP (ref 0–0.06)
WBC # BLD: 8.3 K/UL — SIGNIFICANT CHANGE UP (ref 3.8–10.5)
WBC # FLD AUTO: 8.3 K/UL — SIGNIFICANT CHANGE UP (ref 3.8–10.5)

## 2018-02-19 PROCEDURE — 71045 X-RAY EXAM CHEST 1 VIEW: CPT | Mod: 26

## 2018-02-19 PROCEDURE — 93971 EXTREMITY STUDY: CPT | Mod: 26,LT

## 2018-02-19 RX ORDER — APIXABAN 2.5 MG/1
2.5 TABLET, FILM COATED ORAL EVERY 12 HOURS
Qty: 0 | Refills: 0 | Status: DISCONTINUED | OUTPATIENT
Start: 2018-02-19 | End: 2018-02-21

## 2018-02-19 RX ORDER — FERROUS SULFATE 325(65) MG
1 TABLET ORAL
Qty: 0 | Refills: 0 | COMMUNITY

## 2018-02-19 RX ORDER — FUROSEMIDE 40 MG
20 TABLET ORAL EVERY 12 HOURS
Qty: 0 | Refills: 0 | Status: COMPLETED | OUTPATIENT
Start: 2018-02-19 | End: 2018-02-20

## 2018-02-19 RX ORDER — FUROSEMIDE 40 MG
20 TABLET ORAL ONCE
Qty: 0 | Refills: 0 | Status: COMPLETED | OUTPATIENT
Start: 2018-02-19 | End: 2018-02-19

## 2018-02-19 RX ORDER — METOPROLOL TARTRATE 50 MG
0 TABLET ORAL
Qty: 0 | Refills: 0 | COMMUNITY

## 2018-02-19 RX ORDER — METOPROLOL TARTRATE 50 MG
50 TABLET ORAL
Qty: 0 | Refills: 0 | Status: DISCONTINUED | OUTPATIENT
Start: 2018-02-19 | End: 2018-02-21

## 2018-02-19 RX ORDER — LISINOPRIL 2.5 MG/1
20 TABLET ORAL DAILY
Qty: 0 | Refills: 0 | Status: DISCONTINUED | OUTPATIENT
Start: 2018-02-19 | End: 2018-02-21

## 2018-02-19 RX ORDER — METOPROLOL TARTRATE 50 MG
1 TABLET ORAL
Qty: 0 | Refills: 0 | COMMUNITY

## 2018-02-19 RX ADMIN — APIXABAN 2.5 MILLIGRAM(S): 2.5 TABLET, FILM COATED ORAL at 17:52

## 2018-02-19 RX ADMIN — Medication 20 MILLIGRAM(S): at 14:36

## 2018-02-19 RX ADMIN — LISINOPRIL 20 MILLIGRAM(S): 2.5 TABLET ORAL at 17:20

## 2018-02-19 RX ADMIN — Medication 50 MILLIGRAM(S): at 17:21

## 2018-02-19 NOTE — H&P ADULT - NSHPLABSRESULTS_GEN_ALL_CORE
EKG: ordered  Lazara x1: neg  proBNP: 6486  CXR: Moderate bilateral pleural effusions. Pulmonary edema.  RLE Doppler: No evidence of right lower extremity deep venous thrombosis.

## 2018-02-19 NOTE — ED PROVIDER NOTE - MUSCULOSKELETAL, MLM
Spine appears normal, range of motion is not limited, no muscle or joint tenderness, mild right le swelling

## 2018-02-19 NOTE — H&P ADULT - NEGATIVE NEUROLOGICAL SYMPTOMS
no tremors/no weakness/no loss of consciousness/no syncope/no headache/no generalized seizures/no loss of sensation/no paresthesias

## 2018-02-19 NOTE — H&P ADULT - NEGATIVE GENERAL GENITOURINARY SYMPTOMS
no renal colic/no hematuria/no flank pain L/no dysuria/normal urinary frequency/no incontinence/no flank pain R

## 2018-02-19 NOTE — H&P ADULT - CARDIOVASCULAR DETAILS
irregular rate and rhythm/positive S1/positive S2 irregular rate and rhythm/positive S2/murmur/positive S1

## 2018-02-19 NOTE — ED PROVIDER NOTE - MEDICAL DECISION MAKING DETAILS
83yo female with MCMILLAN, no infectious symptoms, likely CHF exacerbation, will get LE DVT study, less likely PE, labs, ce, probnp admit

## 2018-02-19 NOTE — ED ADULT NURSE NOTE - CHPI ED SYMPTOMS NEG
no chills/no body aches/no edema/no headache/no hemoptysis/no wheezing/no chest pain/no diaphoresis/no fever

## 2018-02-19 NOTE — H&P ADULT - BACK EXAM
pt had difficulty pulling herself from supine position to siting up/normal shape/ROM intact/decreased strength results pending

## 2018-02-19 NOTE — ED PROVIDER NOTE - ATTENDING CONTRIBUTION TO CARE
azael: pmh includes CVA x 2 and HTN. pt noted 3 days of mcmillan. not at rest. no other new sx. no known hx chf.   pt stopped her eliquis and other meds for 2 days (felt depressed) and restarted today.   no other new complaints. pt denies hx leg swelling  exam: NAD sat 96% RA. rr 14-16  there are rhonchi bilateral, ? rales.  there is edema and nontender swelling to rt leg. no redness.   impression: new MCMILLAN,. leg swelling  recc: labs including bnp and troponin. cxr and duplex rle. azael: pmh includes CVA x 2 and HTN. pt noted 3 days of mcmillan. not at rest. no other new sx. no known hx chf.   pt stopped her eliquis and other meds for 2 days (felt depressed) and restarted today.   no other new complaints. pt denies hx leg swelling.  exam: NAD sat 96% RA. rr 14-16  there are rhonchi bilateral, ? rales.  there is edema and nontender swelling to rt leg. no redness.   impression: new MCMILLAN,. leg swelling  recc: labs including bnp and troponin. cxr and duplex rle.

## 2018-02-19 NOTE — H&P ADULT - NSHPSOCIALHISTORY_GEN_ALL_CORE
83 yo F lives alone in own residence and uses a walker to ambulate around the house. Pt denies h/o of tobacco use, alcohol use, or illicit drug use.

## 2018-02-19 NOTE — H&P ADULT - PROBLEM SELECTOR PLAN 2
continue eliquis and diltiazem  CHADS score: 2  monitor for falls and bleeding risk precautions CHADS2 score=3  continue Eliquis and Metoprolol  Maintain falls and bleeding precautions

## 2018-02-19 NOTE — H&P ADULT - HISTORY OF PRESENT ILLNESS
83 yo F with a PMHx of Atrial Fibrillation (on eliquis), CAD (s/p one stent), HLD, HTN, presents to the ED due to new onset of MCMILLAN that began three days ago. Pt reported that she usually able to walk 2-3 blocks without feeling out of breath 83 yo F with a PMHx of Atrial Fibrillation (on eliquis), CAD (s/p one stent), HLD, HTN, presents to the ED due to new onset of MCMILLAN that began three days ago. Pt reported that she usually able to walk 2-3 blocks without feeling out of breath but recently for the past three days, she is unable to walk at least one block without starting to feel out of breath as well as go up the stairs, when prior to three days ago, she was able to walk up the stairs with no issue. In addition, pt reported that she lost weight recently the "past few weeks," because she had a loss of appetite. When asked about the MCMILLAN, pt mentioned this is a symptom she never experienced before and did not feel the same as she did when she had her two previous MIs. As per pharmacy, pt takes her metoprolol tartrate once a day even though it was instructed to her to take the pill two times a day. Lastly, pt mentioned that the clinician that saw her when she came to the hospital today mentioned to her that she has R lower leg extremity but prior to her hospital stay, she has never noticed Pt denies: fever, weakness, malaise, fatigue, N/V/D, chest pain, palpitations, syncope, changes in vision, blurry vision, headaches, coughing, wheezing, pleuritic chest pain, changes in bowel movements, hematochezia, melena, muscle/joint pain, dysuria. 83 yo F with a PMHx of Atrial Fibrillation (on eliquis), CAD (s/p one stent), HLD, HTN, presents to the ED due to new onset of MCMILLAN that began three days ago. Pt reported that she usually able to walk 2-3 blocks without feeling out of breath but recently for the past three days, she is unable to walk at least one block without starting to feel out of breath as well as go up the stairs, when prior to three days ago, she was able to walk up the stairs with no issue.   In addition, pt reported that she lost weight recently the "past few weeks," because she had a loss of appetite.   When asked about the MCMILLAN, pt mentioned this is a symptom she never experienced before and did not feel the same as she did when she had her two previous MIs. As per pharmacy, pt takes her metoprolol tartrate once a day even though it was instructed to her to take the pill two times a day.   Lastly, pt mentioned that the clinician that saw her when she came to the hospital today mentioned to her that she has R lower leg extremity but prior to her hospital stay, she has never noticed Pt denies: fever, weakness, malaise, fatigue, N/V/D, chest pain, palpitations, syncope, changes in vision, blurry vision, headaches, coughing, wheezing, pleuritic chest pain, changes in bowel movements, hematochezia, melena, muscle/joint pain, dysuria.

## 2018-02-19 NOTE — ED PROVIDER NOTE - OBJECTIVE STATEMENT
83yo female h/o htn, CVA, MI Afib on eliquis presents with 3 days MCMILLAN. Pt usually able to walk up steps w/o SOB, now cant walk up >5 steps without feeling dyspneic. NO dyspnea at rest. NO chest pain. NO h/o DVT/PE but has right LE swelling, unknown when it started. No cough, no fevers, no chills no sick contacts. NO h/o CHF and not on diuretic.  PMD Dr Masters

## 2018-02-19 NOTE — H&P ADULT - NEUROLOGICAL DETAILS
in back/strength decreased/alert and oriented x 3/responds to verbal commands/no spontaneous movement/normal strength/sensation intact

## 2018-02-19 NOTE — H&P ADULT - ASSESSMENT
85 yo F with a PMHx of Atrial Fibrillation (on eliquis), CAD (s/p one stent), HLD, HTN, presents to the ED due to new onset of MCMILLAN, with pleural effusion noted on CXR, negative R LE Doppler Ultrasound, and BNP value of 6,486, admitted to telemetry for CHF. 83 yo F with a PMHx of Atrial Fibrillation (on eliquis), CAD (s/p one stent), HLD, HTN, presents to the ED due to new onset of MCMILLAN, with pleural effusion noted on CXR, negative R LE Doppler Ultrasound, and BNP value of 6,486, admitted to telemetry for acute CHF, r/o ACS.

## 2018-02-19 NOTE — H&P ADULT - NEGATIVE OPHTHALMOLOGIC SYMPTOMS
no pain R/no loss of vision L/no loss of vision R/no blurred vision R/no diplopia/no photophobia/no pain L/no blurred vision L

## 2018-02-19 NOTE — H&P ADULT - MUSCULOSKELETAL
details… detailed exam no joint swelling/no joint erythema/ROM intact/no calf tenderness/diminished strength

## 2018-02-20 LAB
BUN SERPL-MCNC: 20 MG/DL — SIGNIFICANT CHANGE UP (ref 7–23)
CALCIUM SERPL-MCNC: 8.6 MG/DL — SIGNIFICANT CHANGE UP (ref 8.4–10.5)
CHLORIDE SERPL-SCNC: 101 MMOL/L — SIGNIFICANT CHANGE UP (ref 98–107)
CHOLEST SERPL-MCNC: 137 MG/DL — SIGNIFICANT CHANGE UP (ref 120–199)
CO2 SERPL-SCNC: 24 MMOL/L — SIGNIFICANT CHANGE UP (ref 22–31)
CREAT SERPL-MCNC: 1.03 MG/DL — SIGNIFICANT CHANGE UP (ref 0.5–1.3)
GLUCOSE SERPL-MCNC: 101 MG/DL — HIGH (ref 70–99)
HBA1C BLD-MCNC: 6.3 % — HIGH (ref 4–5.6)
HCT VFR BLD CALC: 40 % — SIGNIFICANT CHANGE UP (ref 34.5–45)
HDLC SERPL-MCNC: 35 MG/DL — LOW (ref 45–65)
HGB BLD-MCNC: 12 G/DL — SIGNIFICANT CHANGE UP (ref 11.5–15.5)
LIPID PNL WITH DIRECT LDL SERPL: 82 MG/DL — SIGNIFICANT CHANGE UP
MCHC RBC-ENTMCNC: 24.1 PG — LOW (ref 27–34)
MCHC RBC-ENTMCNC: 30 % — LOW (ref 32–36)
MCV RBC AUTO: 80.3 FL — SIGNIFICANT CHANGE UP (ref 80–100)
NRBC # FLD: 0 — SIGNIFICANT CHANGE UP
PLATELET # BLD AUTO: 308 K/UL — SIGNIFICANT CHANGE UP (ref 150–400)
PMV BLD: 10.7 FL — SIGNIFICANT CHANGE UP (ref 7–13)
POTASSIUM SERPL-MCNC: 3.4 MMOL/L — LOW (ref 3.5–5.3)
POTASSIUM SERPL-SCNC: 3.4 MMOL/L — LOW (ref 3.5–5.3)
RBC # BLD: 4.98 M/UL — SIGNIFICANT CHANGE UP (ref 3.8–5.2)
RBC # FLD: 19.4 % — HIGH (ref 10.3–14.5)
SODIUM SERPL-SCNC: 144 MMOL/L — SIGNIFICANT CHANGE UP (ref 135–145)
TRIGL SERPL-MCNC: 111 MG/DL — SIGNIFICANT CHANGE UP (ref 10–149)
WBC # BLD: 12.82 K/UL — HIGH (ref 3.8–10.5)
WBC # FLD AUTO: 12.82 K/UL — HIGH (ref 3.8–10.5)

## 2018-02-20 RX ORDER — POTASSIUM CHLORIDE 20 MEQ
40 PACKET (EA) ORAL ONCE
Qty: 0 | Refills: 0 | Status: COMPLETED | OUTPATIENT
Start: 2018-02-20 | End: 2018-02-20

## 2018-02-20 RX ORDER — FUROSEMIDE 40 MG
20 TABLET ORAL EVERY 12 HOURS
Qty: 0 | Refills: 0 | Status: DISCONTINUED | OUTPATIENT
Start: 2018-02-21 | End: 2018-02-21

## 2018-02-20 RX ADMIN — Medication 20 MILLIGRAM(S): at 22:29

## 2018-02-20 RX ADMIN — Medication 20 MILLIGRAM(S): at 11:53

## 2018-02-20 RX ADMIN — Medication 20 MILLIGRAM(S): at 00:02

## 2018-02-20 RX ADMIN — LISINOPRIL 20 MILLIGRAM(S): 2.5 TABLET ORAL at 05:16

## 2018-02-20 RX ADMIN — APIXABAN 2.5 MILLIGRAM(S): 2.5 TABLET, FILM COATED ORAL at 05:16

## 2018-02-20 RX ADMIN — APIXABAN 2.5 MILLIGRAM(S): 2.5 TABLET, FILM COATED ORAL at 17:16

## 2018-02-20 RX ADMIN — Medication 50 MILLIGRAM(S): at 17:16

## 2018-02-20 RX ADMIN — Medication 40 MILLIEQUIVALENT(S): at 11:53

## 2018-02-20 RX ADMIN — Medication 50 MILLIGRAM(S): at 05:16

## 2018-02-20 NOTE — CONSULT NOTE ADULT - SUBJECTIVE AND OBJECTIVE BOX
CHIEF COMPLAINT:  sshortness of breath, abnormal chest x-ray  HISTORY OF PRESENT ILLNESS:  HPI:  83 yo F with a PMHx of Atrial Fibrillation (on eliquis), CAD (s/p one stent), HLD, HTN, presents to the ED due to new onset of MCMILLAN that began three days ago. Pt reported that she usually able to walk 2-3 blocks without feeling out of breath but recently for the past three days, she is unable to walk at least one block without starting to feel out of breath as well as go up the stairs, when prior to three days ago, she was able to walk up the stairs with no issue.   In addition, pt reported that she lost weight recently the "past few weeks," because she had a loss of appetite.   When asked about the MCMILLAN, pt mentioned this is a symptom she never experienced before and did not feel the same as she did when she had her two previous MIs. As per pharmacy, pt takes her metoprolol tartrate once a day even though it was instructed to her to take the pill two times a day.   Lastly, pt mentioned that the clinician that saw her when she came to the hospital today mentioned to her that she has R lower leg extremity but prior to her hospital stay, she has never noticed Pt denies: fever, weakness, malaise, fatigue, N/V/D, chest pain, palpitations, syncope, changes in vision, blurry vision, headaches, coughing, wheezing, pleuritic chest pain, changes in bowel movements, hematochezia, melena, muscle/joint pain, dysuria.    Patient was started on IV Lasixand today feels well without shortness of breath, her legs are less swollen  She denies chest pain and her enzymes are normal    PAST MEDICAL & SURGICAL HISTORY:  Atrial fibrillation  s/p Angioplasty with Stent  Coronary Artery Disease  Hyperlipemia  Hypertension  No significant past surgical history          MEDICATIONS:  apixaban 2.5 milliGRAM(s) Oral every 12 hours  furosemide   Injectable 20 milliGRAM(s) IV Push every 12 hours  lisinopril 20 milliGRAM(s) Oral daily  metoprolol     tartrate 50 milliGRAM(s) Oral two times a day                  FAMILY HISTORY:  Family history of hypertension (Father, Mother)      Allergies    No Known Allergies    Intolerances    	  PHYSICAL EXAM:  T(C): 36.8 (02-20-18 @ 14:00), Max: 36.8 (02-20-18 @ 14:00)  HR: 85 (02-20-18 @ 14:00) (84 - 98)  BP: 140/82 (02-20-18 @ 14:00) (131/89 - 148/99)  RR: 16 (02-20-18 @ 14:00) (16 - 17)  SpO2: 99% (02-20-18 @ 14:00) (95% - 100%)  Wt(kg): --  I&O's Summary    19 Feb 2018 07:01  -  20 Feb 2018 07:00  --------------------------------------------------------  IN: 200 mL / OUT: 1000 mL / NET: -800 mL    20 Feb 2018 07:01  -  20 Feb 2018 16:12  --------------------------------------------------------  IN: 236 mL / OUT: 0 mL / NET: 236 mL        Appearance: Normalomfortable no acute distress	  HEENT:   Normal oral mucosa, PERRL, EOMI	  Cardiovascular: Normal S1 S2,iirregularly irregularNo JVD, No murmursno S3  Respiratory: Lungs decreased breath sounds at the bases  no rales	  Psychiatry: A & O x 3, Mood & affect appropriate  Gastrointestinal:  Soft, Non-tender, + BS	  Skin: No rashes, No ecchymoses, No cyanosis	  Neurologic: Non-focal  Extremities: No clubbing, cyanosis or edema  Vascular: Peripheral pulses palpable 2+ bilaterally    TELEMETRY: 	    ECG:  < from: 12 Lead ECG (04.12.17 @ 10:03) >  Diagnosis Line Atrial fibrillation with rapid ventricular response  NonspecificST and T wave abnormality , probably digitalis effect  Abnormal ECG    ECG 2/19 no cahnge     Xray Chest 1 View- PORTABLE-Urgent (02.19.18 @ 12:39) >  FINDINGS:     The cardiomediastinal silhouette is not well evaluated in this   projection. There are moderate bilateral pleural effusions. Mildly   increased interstitial lung markings likely represent pulmonary edema.   There is no pneumothorax. No focal consolidation identified. Generalized   osteopenia. Degenerative changes of the spine and the bilateral shoulders.    IMPRESSION:   Moderate bilateral pleural effusions. Pulmonary edema.      DUSTY JAIN M.D., RADIOLOGY RESIDENT  This document has been electronically signed.  ARNOLD HESS M.D. ATTENDING RADIOLOGIST    < end of copied text >  	  LABS:	 	    CARDIAC MARKERS:  Troponin T, Serum: < 0.06 ng/mL (02-19 @ 19:29)  Troponin T, Serum: < 0.06 ng/mL (02-19 @ 12:30)      CKMB: 3.46 ng/mL (02-19 @ 19:29)    CKMB Relative Index: Test not performed (02-19 @ 19:29)                            12.0   12.82 )-----------( 308      ( 20 Feb 2018 06:09 )             40.0     02-20    144  |  101  |  20  ----------------------------<  101<H>  3.4<L>   |  24  |  1.03    Ca    8.6      20 Feb 2018 06:09    TPro  6.7  /  Alb  3.3  /  TBili  1.2  /  DBili  x   /  AST  21  /  ALT  14  /  AlkPhos  87  02-19    proBNP: Serum Pro-Brain Natriuretic Peptide: 6486 pg/mL (02-19 @ 12:30)    Lipid Profile:   HgA1c: Hemoglobin A1C, Whole Blood: 6.3 % (02-20 @ 06:09)    TSH:

## 2018-02-20 NOTE — PROGRESS NOTE ADULT - SUBJECTIVE AND OBJECTIVE BOX
_________________________________________________________________________________________  ========>>  M E D I C A L   A T T E N D I N G    F O L L O W  U P  N O T E  <<=========  -----------------------------------------------------------------------------------------------------    - Patient seen and examined by me approximately thirty minutes ago.  - In summary, patient is a 84y year old woman who originally presented with SOB and MCMILLAN.  - Patient today overall doing ok, comfortable, eating OK.  overall feels better, SOB improved.    ==================>> REVIEW OF SYSTEM <<=================    GEN: no fever, no chills, no pain  RESP: SOB improved , no cough, no sputum  CVS: no chest pain, no palpitations, edema improved   GI: no abdominal pain, no nausea, no constipation, no diarrhea  : no dysuria, no frequency, no hematuria  Neuro: no headache, no dizziness  Derm : no itching, no rash    ==================>> PHYSICAL EXAM <<=================    GEN: A&O X 3 , NAD , comfortable  HEENT: NCAT, PERRL, MMM, hearing intact  Neck: supple , no JVD  CVS: S1S2 , regular , No M/R/G appreciated  PULM: CTA B/L,  decreased BS on RLL  ABD.: soft. non tender, non distended,  bowel sounds present  Extrem: intact pulses , no edema   PSYCH : normal mood,  not anxious      ==================>> MEDICATIONS <<====================    MEDICATIONS  (STANDING):  apixaban 2.5 milliGRAM(s) Oral every 12 hours  furosemide   Injectable 20 milliGRAM(s) IV Push every 12 hours  lisinopril 20 milliGRAM(s) Oral daily  metoprolol     tartrate 50 milliGRAM(s) Oral two times a day  potassium chloride   Powder 40 milliEquivalent(s) Oral once    ==================>> VITAL SIGNS <<==================    T(C): 36.5 (02-20-18 @ 05:15), Max: 36.7 (02-19-18 @ 15:09)  HR: 95 (02-20-18 @ 05:15) (90 - 108)  BP: 148/99 (02-20-18 @ 05:15) (120/88 - 148/99)  RR: 16 (02-20-18 @ 05:15) (16 - 20)  SpO2: 100% (02-20-18 @ 05:15) (93% - 100%)    I&O's Summary    19 Feb 2018 07:01  -  20 Feb 2018 07:00  --------------------------------------------------------  IN: 200 mL / OUT: 1000 mL / NET: -800 mL     ==================>> LAB AND IMAGING <<==================                        12.0   12.82 )-----------( 308      ( 20 Feb 2018 06:09 )             40.0        WBC count:   12.82 <<== ,  8.30 <<==     Hemoglobin:   12.0 <<==,  11.4 <<==    144  |  101  |  20  ----------------------------<  101<H>  3.4<L>   |  24  |  1.03    Ca    8.6      20 Feb 2018 06:09    TPro  6.7  /  Alb  3.3  /  TBili  1.2  /  DBili  x   /  AST  21  /  ALT  14  /  AlkPhos  87  02-19    PT/INR - ( 19 Feb 2018 12:30 )   PT: 17.7 SEC;   INR: 1.58     PTT - ( 19 Feb 2018 12:30 )  PTT:33.6 SEC              CARDIAC MARKERS ( 19 Feb 2018 19:29 )  x     / < 0.06 ng/mL / 46 u/L / 3.46 ng/mL / x      CARDIAC MARKERS ( 19 Feb 2018 12:30 )  x     / < 0.06 ng/mL / x     / x     / x         Lipid profile:  (02-20-18)     Total: 137     LDL  : 82     HDL  :35     TG   :111     HgA1C: 6.3  (02-20-18)            ___________________________________________________________________________________  ===============>>  A S S E S S M E N T   A N D   P L A N <<===============  ------------------------------------------------------------------------------------------    · Assessment      85 yo F with a PMHx of Atrial Fibrillation (on eliquis), CAD (s/p one stent), HLD, HTN, presents to the ED due to new onset of MCMILLAN, with pleural effusion noted on CXR, negative R LE Doppler Ultrasound, and BNP value of 6,486, admitted to telemetry for acute CHF, r/o ACS.    Problem/Plan - 1:  ·  Problem: Acute heart failure, vs worsened Mitral regurgitation, overall improved  tele monitor  ruled out for ACS by enzymes   serial EKGs  Strict I&Os plus Daily weights.  Lasix 20mg IV Q12   TTE  c/w Metoprolol 50mg BID and Lisinopril  keep K ~4: supplement hypokalemia   cardio f/u.  Problem/Plan - 2:  ·  Problem: Chronic atrial fibrillation  continue Eliquis and Metoprolol  Maintain falls and bleeding precautions.   tele  cardio    Problem/Plan - 3:  ·  Problem: Coronary Artery Disease.    continue metoprolol tartrate and lisinopril.     Problem/Plan - 4:  ·  Problem: Hypertension.    continue lisinopril and metoprolol tartrate  DASH diet counseling.     Problem/Plan - 5:  ·  Problem: Hyperlipemia h/o  Lipid profile noted  stable off meds    -GI/DVT Prophylaxis.    --------------------------------------------  Case discussed with pt  Education given on plan of care, Echo  ___________________________  H. JOSE LUIS Connell.  Pager: [phone]

## 2018-02-20 NOTE — CONSULT NOTE ADULT - ASSESSMENT
1. congestive heart failure-no evidence of ischemia, probably diastolic dysfunction  2. Atrial fibrillation chronic ventricular rate controlled  3. Status post stents,, patent  in 2010  4. Hemodynamically stable today    Recommend  22-D echo Doppler  Continue present regimen of medications  Switch to by mouth Lasix in the morning  Discharge planning.    Nigel So M.D.

## 2018-02-20 NOTE — PHYSICAL THERAPY INITIAL EVALUATION ADULT - CRITERIA FOR SKILLED THERAPEUTIC INTERVENTIONS
therapy frequency/impairments found/functional limitations in following categories/predicted duration of therapy intervention/rehab potential/anticipated discharge recommendation

## 2018-02-20 NOTE — PHYSICAL THERAPY INITIAL EVALUATION ADULT - ADDITIONAL COMMENTS
Pt. reports owning DME of straight cane.     Pt. was left supine in bed, NAD, call jacobs within reach. RN Tara made aware of pt. status and participation in PT.

## 2018-02-21 ENCOUNTER — TRANSCRIPTION ENCOUNTER (OUTPATIENT)
Age: 83
End: 2018-02-21

## 2018-02-21 VITALS
RESPIRATION RATE: 18 BRPM | OXYGEN SATURATION: 96 % | SYSTOLIC BLOOD PRESSURE: 129 MMHG | HEART RATE: 97 BPM | TEMPERATURE: 98 F | DIASTOLIC BLOOD PRESSURE: 87 MMHG

## 2018-02-21 LAB
BUN SERPL-MCNC: 28 MG/DL — HIGH (ref 7–23)
CALCIUM SERPL-MCNC: 8.6 MG/DL — SIGNIFICANT CHANGE UP (ref 8.4–10.5)
CHLORIDE SERPL-SCNC: 103 MMOL/L — SIGNIFICANT CHANGE UP (ref 98–107)
CO2 SERPL-SCNC: 27 MMOL/L — SIGNIFICANT CHANGE UP (ref 22–31)
CREAT SERPL-MCNC: 1.08 MG/DL — SIGNIFICANT CHANGE UP (ref 0.5–1.3)
GLUCOSE SERPL-MCNC: 104 MG/DL — HIGH (ref 70–99)
HCT VFR BLD CALC: 38.2 % — SIGNIFICANT CHANGE UP (ref 34.5–45)
HGB BLD-MCNC: 11.2 G/DL — LOW (ref 11.5–15.5)
MAGNESIUM SERPL-MCNC: 1.8 MG/DL — SIGNIFICANT CHANGE UP (ref 1.6–2.6)
MCHC RBC-ENTMCNC: 23 PG — LOW (ref 27–34)
MCHC RBC-ENTMCNC: 29.3 % — LOW (ref 32–36)
MCV RBC AUTO: 78.6 FL — LOW (ref 80–100)
NRBC # FLD: 0 — SIGNIFICANT CHANGE UP
NT-PROBNP SERPL-SCNC: 6242 PG/ML — SIGNIFICANT CHANGE UP
PHOSPHATE SERPL-MCNC: 3.8 MG/DL — SIGNIFICANT CHANGE UP (ref 2.5–4.5)
PLATELET # BLD AUTO: 281 K/UL — SIGNIFICANT CHANGE UP (ref 150–400)
PMV BLD: 10.4 FL — SIGNIFICANT CHANGE UP (ref 7–13)
POTASSIUM SERPL-MCNC: 3.2 MMOL/L — LOW (ref 3.5–5.3)
POTASSIUM SERPL-SCNC: 3.2 MMOL/L — LOW (ref 3.5–5.3)
RBC # BLD: 4.86 M/UL — SIGNIFICANT CHANGE UP (ref 3.8–5.2)
RBC # FLD: 19.6 % — HIGH (ref 10.3–14.5)
SODIUM SERPL-SCNC: 146 MMOL/L — HIGH (ref 135–145)
WBC # BLD: 7.96 K/UL — SIGNIFICANT CHANGE UP (ref 3.8–10.5)
WBC # FLD AUTO: 7.96 K/UL — SIGNIFICANT CHANGE UP (ref 3.8–10.5)

## 2018-02-21 PROCEDURE — 93306 TTE W/DOPPLER COMPLETE: CPT | Mod: 26

## 2018-02-21 RX ORDER — APIXABAN 2.5 MG/1
1 TABLET, FILM COATED ORAL
Qty: 0 | Refills: 0 | COMMUNITY
Start: 2018-02-21

## 2018-02-21 RX ORDER — METOPROLOL TARTRATE 50 MG
1 TABLET ORAL
Qty: 0 | Refills: 0 | COMMUNITY
Start: 2018-02-21

## 2018-02-21 RX ORDER — METOPROLOL TARTRATE 50 MG
1 TABLET ORAL
Qty: 0 | Refills: 0 | COMMUNITY

## 2018-02-21 RX ORDER — LISINOPRIL 2.5 MG/1
1 TABLET ORAL
Qty: 0 | Refills: 0 | COMMUNITY
Start: 2018-02-21

## 2018-02-21 RX ORDER — POTASSIUM CHLORIDE 20 MEQ
40 PACKET (EA) ORAL EVERY 4 HOURS
Qty: 0 | Refills: 0 | Status: COMPLETED | OUTPATIENT
Start: 2018-02-21 | End: 2018-02-21

## 2018-02-21 RX ORDER — FUROSEMIDE 40 MG
1 TABLET ORAL
Qty: 60 | Refills: 0 | OUTPATIENT
Start: 2018-02-21 | End: 2018-03-22

## 2018-02-21 RX ORDER — POTASSIUM CHLORIDE 20 MEQ
1 PACKET (EA) ORAL
Qty: 30 | Refills: 0 | OUTPATIENT
Start: 2018-02-21 | End: 2018-03-22

## 2018-02-21 RX ORDER — APIXABAN 2.5 MG/1
1 TABLET, FILM COATED ORAL
Qty: 0 | Refills: 0 | COMMUNITY

## 2018-02-21 RX ADMIN — Medication 40 MILLIEQUIVALENT(S): at 17:23

## 2018-02-21 RX ADMIN — Medication 20 MILLIGRAM(S): at 17:23

## 2018-02-21 RX ADMIN — Medication 20 MILLIGRAM(S): at 05:51

## 2018-02-21 RX ADMIN — APIXABAN 2.5 MILLIGRAM(S): 2.5 TABLET, FILM COATED ORAL at 18:11

## 2018-02-21 RX ADMIN — LISINOPRIL 20 MILLIGRAM(S): 2.5 TABLET ORAL at 05:51

## 2018-02-21 RX ADMIN — APIXABAN 2.5 MILLIGRAM(S): 2.5 TABLET, FILM COATED ORAL at 05:51

## 2018-02-21 RX ADMIN — Medication 50 MILLIGRAM(S): at 17:23

## 2018-02-21 RX ADMIN — Medication 50 MILLIGRAM(S): at 05:51

## 2018-02-21 RX ADMIN — Medication 40 MILLIEQUIVALENT(S): at 12:33

## 2018-02-21 NOTE — DISCHARGE NOTE ADULT - MEDICATION SUMMARY - MEDICATIONS TO TAKE
I will START or STAY ON the medications listed below when I get home from the hospital:    lisinopril 20 mg oral tablet  -- 1 tab(s) by mouth once a day  -- Indication: For Hypertension    apixaban 2.5 mg oral tablet  -- 1 tab(s) by mouth every 12 hours  -- Indication: For Atrial fibrillation    metoprolol tartrate 50 mg oral tablet  -- 1 tab(s) by mouth 2 times a day  -- Indication: For Hypertension    furosemide 20 mg oral tablet  -- 1 tab(s) by mouth every 12 hours  -- Indication: For ChF    K-Tab 10 mEq oral tablet, extended release  -- 1 tab(s) by mouth once a day   -- It is very important that you take or use this exactly as directed.  Do not skip doses or discontinue unless directed by your doctor.  Medication should be taken with plenty of water.  Take with food or milk.    -- Indication: For supplement

## 2018-02-21 NOTE — DISCHARGE NOTE ADULT - HOSPITAL COURSE
85 yo F with a PMHx of Atrial Fibrillation (on eliquis), CAD (s/p one stent), HLD, HTN, presents to the ED due to new onset of MCMILLAN, with pleural effusion noted on CXR, negative R LE Doppler Ultrasound, and BNP value of 6,486, admitted to telemetry for acute CHF, r/o ACS.Pt. r/o for ACE and was treated for CHF with Lasix.  Echo was done and if nml will be discharged to home 2/21/18.  D/W attending. 83 yo F with a PMHx of Atrial Fibrillation (on eliquis), CAD (s/p one stent), HLD, HTN, presents to the ED due to new onset of MCMILLAN, with pleural effusion noted on CXR, negative R LE Doppler Ultrasound, and BNP value of 6,486, admitted to telemetry for acute CHF, r/o ACS.Pt. r/o for ACE and was treated for CHF with Lasix.  Echo was done and if nml will be discharged to home 2/21/18.  D/W attending.  Echo results were discussed with Dr. So and he cleared the patient for DC.  D/W attending.

## 2018-02-21 NOTE — DISCHARGE NOTE ADULT - MEDICATION SUMMARY - MEDICATIONS TO STOP TAKING
I will STOP taking the medications listed below when I get home from the hospital:    diltiaZEM 180 mg/24 hours oral capsule, extended release  -- 1 cap(s) by mouth once a day

## 2018-02-21 NOTE — DISCHARGE NOTE ADULT - OTHER SIGNIFICANT FINDINGS
EKG: Afib @ 98 bpm TWI V5-V6.  Lazara x 2: neg  proBNP: 6486  CXR: Moderate bilateral pleural effusions. Pulmonary edema.  RLE Doppler: No evidence of right lower extremity deep venous thrombosis.  Echo: CONCLUSIONS:  1. Mitral annular calcification, otherwise normal mitral  valve. Moderate-severe mitral regurgitation.  2. Severely dilated left atrium.  LA volume index = 53  cc/m2.  3. Normal left ventricular internal dimensions and wall  thicknesses.  4. Moderate global left ventricular systolic dysfunction.  5. Normal right ventricular size and function.  6. Left pleural effusion.  *** Compared with echocardiogram of 9/16/2010, left  ventricular systolic function has deteriorated.  In  addition, significantly more mitral regurgitation is noted  on the current study.

## 2018-02-21 NOTE — DISCHARGE NOTE ADULT - CARE PROVIDER_API CALL
Gino Masters), Cardiovascular Disease; Internal Medicine  [address] [address] [place] [address]  Phone: [phone]  Fax: [phone]

## 2018-02-21 NOTE — DISCHARGE NOTE ADULT - PATIENT PORTAL LINK FT
You can access the [facility] Patient Portal, offered by [facility], by registering with the following website: [email or web address]

## 2018-02-21 NOTE — DISCHARGE NOTE ADULT - CARE PLAN
Principal Discharge DX:	Acute heart failure, unspecified heart failure type  Goal:	prevent acute exaccerbation  Assessment and plan of treatment:	low salt diet, daily weight, continue current meds, f/u with cardiologist in 1 week  Secondary Diagnosis:	Hyperlipemia  Assessment and plan of treatment:	low chol diet  continue current meds  Secondary Diagnosis:	Atrial fibrillation  Assessment and plan of treatment:	continue current meds  Secondary Diagnosis:	Hypertension  Assessment and plan of treatment:	low salt diet  continue current meds Principal Discharge DX:	Systolic and diastolic CHF, acute on chronic  Goal:	prevent acute exaccerbation  Assessment and plan of treatment:	low salt diet, daily weight, continue current meds, f/u with cardiologist in 1 week  Blood pressure control-monitor closely  Secondary Diagnosis:	Hyperlipemia  Assessment and plan of treatment:	low chol diet  continue current meds  Secondary Diagnosis:	Atrial fibrillation  Assessment and plan of treatment:	continue current meds  Secondary Diagnosis:	Hypertension  Assessment and plan of treatment:	low salt diet  continue current meds  Secondary Diagnosis:	Mitral valve insufficiency, unspecified etiology  Assessment and plan of treatment:	F/U with Dr. Masters

## 2018-02-21 NOTE — DISCHARGE NOTE ADULT - PLAN OF CARE
prevent acute exaccerbation low salt diet, daily weight, continue current meds, f/u with cardiologist in 1 week low chol diet  continue current meds continue current meds low salt diet  continue current meds low salt diet, daily weight, continue current meds, f/u with cardiologist in 1 week  Blood pressure control-monitor closely F/U with Dr. Masters

## 2018-02-21 NOTE — PROGRESS NOTE ADULT - SUBJECTIVE AND OBJECTIVE BOX
_________________________________________________________________________________________  ========>>  M E D I C A L   A T T E N D I N G    F O L L O W  U P  N O T E  <<=========  -----------------------------------------------------------------------------------------------------    - Patient seen and examined by me approximately thirty minutes ago.  - In summary, patient is a 84y year old woman who originally presented with SOB and MCMILLAN.  - Patient today overall doing ok, comfortable, eating OK.  overall feels better, SOB improved.    ==================>> REVIEW OF SYSTEM <<=================    GEN: no fever, no chills, no pain  RESP: SOB improved , no cough, no sputum  CVS: no chest pain, no palpitations, edema improved   GI: no abdominal pain, no nausea, no constipation, no diarrhea  : no dysuria, no frequency, no hematuria  Neuro: no headache, no dizziness  Derm : no itching, no rash    ==================>> PHYSICAL EXAM <<=================    GEN: A&O X 3 , NAD , comfortable  HEENT: NCAT, PERRL, MMM, hearing intact  Neck: supple , no JVD  CVS: S1S2 , regular , No M/R/G appreciated  PULM: CTA B/L,  decreased BS on RLL  ABD.: soft. non tender, non distended,  bowel sounds present  Extrem: intact pulses , no edema   PSYCH : normal mood,  not anxious       ==================>> MEDICATIONS <<====================    apixaban 2.5 milliGRAM(s) Oral every 12 hours  furosemide    Tablet 20 milliGRAM(s) Oral every 12 hours  lisinopril 20 milliGRAM(s) Oral daily  metoprolol     tartrate 50 milliGRAM(s) Oral two times a day  potassium chloride   Powder 40 milliEquivalent(s) Oral every 4 hours    ==================>> VITAL SIGNS <<==================    Vital Signs Last 24 Hrs  T(C): 36.6 (02-21-18 @ 05:48)  T(F): 97.9 (02-21-18 @ 05:48), Max: 98.3 (02-20-18 @ 14:00)  HR: 92 (02-21-18 @ 05:48) (84 - 108)  BP: 144/81 (02-21-18 @ 05:48)  BP(mean): --  RR: 17 (02-21-18 @ 05:48) (16 - 17)  SpO2: 100% (02-21-18 @ 05:48) (99% - 100%)       ==================>> LAB AND IMAGING <<==================                        11.2   7.96  )-----------( 281      ( 21 Feb 2018 06:15 )             38.2     146<H>  |  103  |  28<H>  ----------------------------<  104<H>  3.2<L>   |  27  |  1.08    Ca    8.6      21 Feb 2018 06:15  Phos  3.8     02-21  Mg     1.8     02-21    TPro  6.7  /  Alb  3.3  /  TBili  1.2  /  DBili  x   /  AST  21  /  ALT  14  /  AlkPhos  87  02-19    ___________________________________________________________________________________  ===============>>  A S S E S S M E N T   A N D   P L A N <<===============  ------------------------------------------------------------------------------------------    · Assessment		  83 yo F with a PMHx of Atrial Fibrillation (on eliquis), CAD (s/p one stent), HLD, HTN, presents to the ED due to new onset of MCMILLAN, with pleural effusion noted on CXR, negative R LE Doppler Ultrasound, and BNP value of 6,486, admitted to telemetry for acute CHF, r/o ACS.    Problem/Plan - 1:  ·  Problem: Acute heart failure, vs worsened Mitral regurgitation, overall improved  tele monitor  ruled out for ACS by enzymes   serial EKGs  Strict I&Os plus Daily weights.  Lasix as ordered  TTE pending  c/w Metoprolol 50mg BID and Lisinopril  keep K ~4: supplement hypokalemia   cardio f/u.    Problem/Plan - 2:  ·  Problem: Chronic atrial fibrillation  continue Eliquis and Metoprolol  tele  cardio    Problem/Plan - 3:  ·  Problem: Coronary Artery Disease.    continue metoprolol tartrate and lisinopril.     Problem/Plan - 4:  ·  Problem: Hypertension.    continue lisinopril and metoprolol tartrate  DASH diet counseling.     Problem/Plan - 5:  ·  Problem: Hyperlipemia h/o  Lipid profile noted  stable off meds    -GI/DVT Prophylaxis.    --------------------------------------------  Case discussed with pt  Education given on plan of care, Echo  ___________________________  H. JOSE LUIS Connell.  Pager: [phone]

## 2018-03-26 ENCOUNTER — RX RENEWAL (OUTPATIENT)
Age: 83
End: 2018-03-26

## 2018-08-07 ENCOUNTER — INPATIENT (INPATIENT)
Facility: HOSPITAL | Age: 83
LOS: 4 days | Discharge: HOME HEALTH | End: 2018-08-12
Attending: GENERAL PRACTICE | Admitting: GENERAL PRACTICE
Payer: MEDICARE

## 2018-08-07 VITALS
TEMPERATURE: 99 F | OXYGEN SATURATION: 100 % | RESPIRATION RATE: 16 BRPM | DIASTOLIC BLOOD PRESSURE: 94 MMHG | SYSTOLIC BLOOD PRESSURE: 164 MMHG | HEART RATE: 108 BPM

## 2018-08-07 DIAGNOSIS — I50.23 ACUTE ON CHRONIC SYSTOLIC (CONGESTIVE) HEART FAILURE: ICD-10-CM

## 2018-08-07 DIAGNOSIS — I48.2 CHRONIC ATRIAL FIBRILLATION: ICD-10-CM

## 2018-08-07 DIAGNOSIS — Z29.9 ENCOUNTER FOR PROPHYLACTIC MEASURES, UNSPECIFIED: ICD-10-CM

## 2018-08-07 DIAGNOSIS — I50.9 HEART FAILURE, UNSPECIFIED: ICD-10-CM

## 2018-08-07 DIAGNOSIS — E78.5 HYPERLIPIDEMIA, UNSPECIFIED: ICD-10-CM

## 2018-08-07 DIAGNOSIS — G45.9 TRANSIENT CEREBRAL ISCHEMIC ATTACK, UNSPECIFIED: ICD-10-CM

## 2018-08-07 DIAGNOSIS — I10 ESSENTIAL (PRIMARY) HYPERTENSION: ICD-10-CM

## 2018-08-07 PROBLEM — I48.91 UNSPECIFIED ATRIAL FIBRILLATION: Chronic | Status: ACTIVE | Noted: 2017-04-12

## 2018-08-07 LAB
ALBUMIN SERPL ELPH-MCNC: 3.7 G/DL — SIGNIFICANT CHANGE UP (ref 3.3–5)
ALP SERPL-CCNC: 89 U/L — SIGNIFICANT CHANGE UP (ref 40–120)
ALT FLD-CCNC: 17 U/L — SIGNIFICANT CHANGE UP (ref 4–33)
AST SERPL-CCNC: 29 U/L — SIGNIFICANT CHANGE UP (ref 4–32)
BASOPHILS # BLD AUTO: 0.06 K/UL — SIGNIFICANT CHANGE UP (ref 0–0.2)
BASOPHILS NFR BLD AUTO: 0.9 % — SIGNIFICANT CHANGE UP (ref 0–2)
BILIRUB SERPL-MCNC: 1 MG/DL — SIGNIFICANT CHANGE UP (ref 0.2–1.2)
BUN SERPL-MCNC: 20 MG/DL — SIGNIFICANT CHANGE UP (ref 7–23)
CALCIUM SERPL-MCNC: 8.7 MG/DL — SIGNIFICANT CHANGE UP (ref 8.4–10.5)
CHLORIDE SERPL-SCNC: 105 MMOL/L — SIGNIFICANT CHANGE UP (ref 98–107)
CO2 SERPL-SCNC: 23 MMOL/L — SIGNIFICANT CHANGE UP (ref 22–31)
CREAT SERPL-MCNC: 0.97 MG/DL — SIGNIFICANT CHANGE UP (ref 0.5–1.3)
EOSINOPHIL # BLD AUTO: 0.07 K/UL — SIGNIFICANT CHANGE UP (ref 0–0.5)
EOSINOPHIL NFR BLD AUTO: 1.1 % — SIGNIFICANT CHANGE UP (ref 0–6)
GLUCOSE SERPL-MCNC: 111 MG/DL — HIGH (ref 70–99)
HCT VFR BLD CALC: 37.5 % — SIGNIFICANT CHANGE UP (ref 34.5–45)
HGB BLD-MCNC: 11 G/DL — LOW (ref 11.5–15.5)
IMM GRANULOCYTES # BLD AUTO: 0.02 # — SIGNIFICANT CHANGE UP
IMM GRANULOCYTES NFR BLD AUTO: 0.3 % — SIGNIFICANT CHANGE UP (ref 0–1.5)
LYMPHOCYTES # BLD AUTO: 1.57 K/UL — SIGNIFICANT CHANGE UP (ref 1–3.3)
LYMPHOCYTES # BLD AUTO: 23.8 % — SIGNIFICANT CHANGE UP (ref 13–44)
MCHC RBC-ENTMCNC: 23.9 PG — LOW (ref 27–34)
MCHC RBC-ENTMCNC: 29.3 % — LOW (ref 32–36)
MCV RBC AUTO: 81.3 FL — SIGNIFICANT CHANGE UP (ref 80–100)
MONOCYTES # BLD AUTO: 0.55 K/UL — SIGNIFICANT CHANGE UP (ref 0–0.9)
MONOCYTES NFR BLD AUTO: 8.3 % — SIGNIFICANT CHANGE UP (ref 2–14)
NEUTROPHILS # BLD AUTO: 4.33 K/UL — SIGNIFICANT CHANGE UP (ref 1.8–7.4)
NEUTROPHILS NFR BLD AUTO: 65.6 % — SIGNIFICANT CHANGE UP (ref 43–77)
NRBC # FLD: 0 — SIGNIFICANT CHANGE UP
NT-PROBNP SERPL-SCNC: 4114 PG/ML — SIGNIFICANT CHANGE UP
PLATELET # BLD AUTO: 232 K/UL — SIGNIFICANT CHANGE UP (ref 150–400)
PMV BLD: 10.8 FL — SIGNIFICANT CHANGE UP (ref 7–13)
POTASSIUM SERPL-MCNC: 3.7 MMOL/L — SIGNIFICANT CHANGE UP (ref 3.5–5.3)
POTASSIUM SERPL-SCNC: 3.7 MMOL/L — SIGNIFICANT CHANGE UP (ref 3.5–5.3)
PROT SERPL-MCNC: 6.8 G/DL — SIGNIFICANT CHANGE UP (ref 6–8.3)
RBC # BLD: 4.61 M/UL — SIGNIFICANT CHANGE UP (ref 3.8–5.2)
RBC # FLD: 20 % — HIGH (ref 10.3–14.5)
SODIUM SERPL-SCNC: 142 MMOL/L — SIGNIFICANT CHANGE UP (ref 135–145)
TROPONIN T, HIGH SENSITIVITY: 18 NG/L — SIGNIFICANT CHANGE UP (ref ?–14)
TROPONIN T, HIGH SENSITIVITY: 19 NG/L — SIGNIFICANT CHANGE UP (ref ?–14)
WBC # BLD: 6.6 K/UL — SIGNIFICANT CHANGE UP (ref 3.8–10.5)
WBC # FLD AUTO: 6.6 K/UL — SIGNIFICANT CHANGE UP (ref 3.8–10.5)

## 2018-08-07 PROCEDURE — 71046 X-RAY EXAM CHEST 2 VIEWS: CPT | Mod: 26

## 2018-08-07 PROCEDURE — 70450 CT HEAD/BRAIN W/O DYE: CPT | Mod: 26

## 2018-08-07 RX ORDER — FUROSEMIDE 40 MG
40 TABLET ORAL ONCE
Qty: 0 | Refills: 0 | Status: COMPLETED | OUTPATIENT
Start: 2018-08-07 | End: 2018-08-07

## 2018-08-07 RX ORDER — APIXABAN 2.5 MG/1
2.5 TABLET, FILM COATED ORAL EVERY 12 HOURS
Qty: 0 | Refills: 0 | Status: DISCONTINUED | OUTPATIENT
Start: 2018-08-07 | End: 2018-08-12

## 2018-08-07 RX ORDER — METOPROLOL TARTRATE 50 MG
50 TABLET ORAL DAILY
Qty: 0 | Refills: 0 | Status: DISCONTINUED | OUTPATIENT
Start: 2018-08-07 | End: 2018-08-07

## 2018-08-07 RX ORDER — ATORVASTATIN CALCIUM 80 MG/1
10 TABLET, FILM COATED ORAL AT BEDTIME
Qty: 0 | Refills: 0 | Status: DISCONTINUED | OUTPATIENT
Start: 2018-08-07 | End: 2018-08-12

## 2018-08-07 RX ORDER — ASPIRIN/CALCIUM CARB/MAGNESIUM 324 MG
162 TABLET ORAL ONCE
Qty: 0 | Refills: 0 | Status: COMPLETED | OUTPATIENT
Start: 2018-08-07 | End: 2018-08-07

## 2018-08-07 RX ORDER — METOPROLOL TARTRATE 50 MG
50 TABLET ORAL
Qty: 0 | Refills: 0 | Status: DISCONTINUED | OUTPATIENT
Start: 2018-08-07 | End: 2018-08-08

## 2018-08-07 RX ORDER — PANTOPRAZOLE SODIUM 20 MG/1
40 TABLET, DELAYED RELEASE ORAL
Qty: 0 | Refills: 0 | Status: DISCONTINUED | OUTPATIENT
Start: 2018-08-07 | End: 2018-08-12

## 2018-08-07 RX ORDER — FUROSEMIDE 40 MG
20 TABLET ORAL DAILY
Qty: 0 | Refills: 0 | Status: DISCONTINUED | OUTPATIENT
Start: 2018-08-08 | End: 2018-08-10

## 2018-08-07 RX ORDER — LISINOPRIL 2.5 MG/1
2.5 TABLET ORAL DAILY
Qty: 0 | Refills: 0 | Status: DISCONTINUED | OUTPATIENT
Start: 2018-08-07 | End: 2018-08-10

## 2018-08-07 RX ADMIN — Medication 162 MILLIGRAM(S): at 10:19

## 2018-08-07 RX ADMIN — Medication 40 MILLIGRAM(S): at 09:18

## 2018-08-07 RX ADMIN — Medication 50 MILLIGRAM(S): at 20:53

## 2018-08-07 NOTE — ED PROVIDER NOTE - ATTENDING CONTRIBUTION TO CARE
I, Jennifer Cabot, MD, have performed a history and physical exam of the patient and discussed their management with the resident. I reviewed the resident's note and agree with the documented findings and plan of care. My medical decision making and observations are found above.    Cabot: 84F with PMH of afib (on Eliquis), CAD s/p stent, CHF, HTN, HLD who comes in with transient numbness of the entire RUE and paralysis of the RUE upon waking this morning.  Last known normal 10pm last night.  She cannot say what position she was sleeping in.  The episode lasted 30 min and then abruptly resolved.  No numbness, tingling between numbness and recovery.  No HA, N/V, blurred vision, speech changes, no residual deficits.  No prior episodes.  She also reports baseline SOB and non compliance with her lasix.  Denies CP, palps, other concerns.  On exam, AAOx3, PERRLA, EOMIB, CN 2-12 intact, 5/5 strength, SILT, no drift, lungs with basal crackles bilat, heart sounds irreg irreg, abd benign, LEs without edema.  Likely peripheral nerve compression from sleep position, but cannot rule out TIA.  Will send basic labs, trop, BNP, EKG, CT head, speak with neuro, anticipate discharge home.

## 2018-08-07 NOTE — H&P ADULT - ASSESSMENT
83 y/o F with PMH of Afib(on Eliquis), CAD(s/p 1 stent), CHF(with EF of 42%), HTN, HLD who presented with the complaint of transient right arm paralysis and numbness and MCMILLAN. R/o TIA vs CVA vs CHF    +Acute on chronic systolic CHF-On IV Lasix 20mg QD

## 2018-08-07 NOTE — CONSULT NOTE ADULT - ASSESSMENT
Patient is an 84 year old female with PMHx of afib on eliquis BID, CAD s/p stents, CHF, HTN, HLD who presents to ED from home for RUE weakness which she noticed this morning and lasted for one hour. last known normal was when she went to bed last night at 10PM. Sleep was unremarkable, but no dizziness, headache, vision change, no hx of RUE weakness. She states she has vertigo when she gets up in the morning but is currently asymptomatic. Currently she has no numbness in any of her extremities, also denies tingling, decreased sensation. At baseline she is able to complete all ADLs, walks with cane, and is AOx3. No recent illnesses, no head trauma.   NIHSS 1, pre-MRS 0  Etiology: likely TIA given resolution of sx and history of afib and prior stroke    recommendation  [] CT head is negative, and symptoms have completely resolved, no further imaging w/u necessary  [] follow up with out patient neurology (Dr Davis's office) and with her PCP and cardiologist  [] resume home medications    Discussed with Dr Davis    Thank you for the consult.

## 2018-08-07 NOTE — H&P ADULT - GASTROINTESTINAL DETAILS
soft/nontender/bowel sounds normal/normal/no guarding/no distention/no rebound tenderness/no rigidity

## 2018-08-07 NOTE — ED PROVIDER NOTE - MEDICAL DECISION MAKING DETAILS
Pt is an 85 y/o female with PMH of afib (On Eliquis), CAD s/p stent, CHF, HTN, HLD who presents with transient right arm paralysis and numbness and generalized weakness since awakening this AM. Likely position related but will r/o ACS and ischemic/hemorrhagic stroke. CT head, EKG, labs, Trop, CXRay Pt is an 85 y/o female with PMH of afib (On Eliquis), CAD s/p stent, CHF, HTN, HLD who presents with transient right arm paralysis and numbness and generalized weakness since awakening this AM. Likely position related but will r/o ACS and ischemic/hemorrhagic stroke. CT head, EKG, labs, Trop, CXRay, neuro consult. Pt is an 85 y/o female with PMH of afib (On Eliquis), CAD s/p stent, CHF, HTN, HLD who presents with transient right arm paralysis and numbness and generalized weakness since awakening this AM. Likely position related but will r/o ACS and ischemic/hemorrhagic stroke. CT head, EKG, labs, Trop, CXR, neuro consult.    Cabot: 84F with PMH of afib (on Eliquis), CAD s/p stent, CHF, HTN, HLD who comes in with transient numbness of the entire RUE and paralysis of the RUE upon waking this morning.  Last known normal 10pm last night.  She cannot say what position she was sleeping in.  The episode lasted 30 min and then abruptly resolved.  No numbness, tingling between numbness and recovery.  No HA, N/V, blurred vision, speech changes, no residual deficits.  No prior episodes.  She also reports baseline SOB and non compliance with her lasix.  Denies CP, palps, other concerns.  On exam, AAOx3, PERRLA, EOMIB, CN 2-12 intact, 5/5 strength, SILT, no drift, lungs with basal crackles bilat, heart sounds irreg irreg, abd benign, LEs without edema.  Likely peripheral nerve compression from sleep position, but cannot rule out TIA.  Will send basic labs, trop, BNP, EKG, CT head, speak with neuro, anticipate discharge home.

## 2018-08-07 NOTE — H&P ADULT - PROBLEM SELECTOR PLAN 1
Monitor on telemetry   HgbA1C, TSH, lipid profile, CBC, CMP in am   House neurology following and their recommendation noted   TTE with bubble study ordered   Continue with Lipitor 10mg QHS daily and Eliquis 2.5mg BID   Seizure, fall and aspiration precautions   Elevate head of the bed to 30 degree  Neuro checks q4hrs   Speech and swallow ordered, placed on dysphagia puree diet

## 2018-08-07 NOTE — H&P ADULT - NEGATIVE GASTROINTESTINAL SYMPTOMS
no diarrhea/no change in bowel habits/no melena/no abdominal pain/no constipation/no nausea/no vomiting/no hematochezia

## 2018-08-07 NOTE — ED PROVIDER NOTE - OBJECTIVE STATEMENT
Pt is an 85 y/o female with PMH of afib (On Eliquis), CAD s/p stent, CHF, HTN, HLD who presents with transient right arm paralysis and numbness and generalized weakness since awakening this AM. Pt states she was at baseline last night at 10PM when she went to bed, and woke up with her right arm feeling "heavy", which resolved after one hour after stimulation with other arm. Does not remember how she slept. She also endorses feeling "queasy" and "dizzy". Does not feel as though the room spinning. She states she has vertigo in the morning when she gets out of bed in the morning, but does not have those symptoms now. Never had this happen before. On ROS, pt endorses SOB (which she has at baseline on exertion and with excitement), palpitations. Does not remember when she last took her Lasix. Denies chest pain, HA, N/V, change in bowel or bladder habits, changes in vision, or current numbness/tingling of extremities. Pt is an 85 y/o female with PMH of afib (On Eliquis), CAD s/p stent, CHF, HTN, HLD who presents with transient right arm paralysis and numbness and generalized weakness since awakening this AM. Pt states she was at baseline last night at 10PM when she went to bed, and woke up with her right arm feeling "heavy", which resolved after one hour after stimulation with other arm. Does not remember how she slept. She also endorses feeling "queasy" and "dizzy". Does not feel as though the room spinning. She states she has vertigo in the morning when she gets out of bed in the morning, but does not have those symptoms now. Never had this happen before. On ROS, pt endorses SOB (which she has at baseline on exertion and with excitement), palpitations. Does not remember when she last took her Lasix. Denies chest pain, HA, N/V, change in bowel or bladder habits, changes in vision, or current numbness/tingling of extremities. Cardiologist Dr. Zepeda, states has not seen him recently, does not remember having a stress test.

## 2018-08-07 NOTE — ED PROVIDER NOTE - PMH
Atrial fibrillation    CHF (congestive heart failure)    Coronary Artery Disease    Hyperlipemia    Hypertension    s/p Angioplasty with Stent

## 2018-08-07 NOTE — H&P ADULT - PROBLEM SELECTOR PLAN 2
Admit to telemetry, serial Lazara, serial EKGs  HgbA1C, TSH, lipid profile, CBC, CMP in am   Low sodium diet, daily weights, monitor I's and O's, fluid restrictions 1000cc/day  Check BNP in 48 hours   Started on Lasix IV 20mg QD   TTE ordered to evaluate LVEF  Cardiology consult with Dr. Recinos to be called by attending

## 2018-08-07 NOTE — ED ADULT NURSE REASSESSMENT NOTE - NS ED NURSE REASSESS COMMENT FT1
patient awake and alert, offering complaints at this time. Pt medicated as per md order. Assisted patient to the restroom. gait is steady. Pt returned to the room and placed on cardiac monitor. awaiting cardiology consult.

## 2018-08-07 NOTE — H&P ADULT - NEGATIVE OPHTHALMOLOGIC SYMPTOMS
no diplopia/no lacrimation L/no discharge R/no photophobia/no discharge L/no lacrimation R/no blurred vision L/no blurred vision R

## 2018-08-07 NOTE — ED ADULT NURSE NOTE - OBJECTIVE STATEMENT
patient arrived to room 4 with a past medical history of afib, currently on apixaban and lasix c/o waking up this morning with the inability to move her right arm. Patient states her right arm wasn't doing what it was supposed to do and when she would pick it up with her other hand it would drop. Patient states that after about thirty minutes her right arm started working again. Patient states she doesn't always take her "water pill" and admits to missing her dose yesterday. Patient denies any chest pain, shortness of breath, dizziness, headaches, blurred vision, speech impairments, nausea, vomiting diarrhea or fevers. Pt is ambulatory with a cane. No swelling noted. IV placed #20g in right AC, labs drawn and sent, vitals assessed.

## 2018-08-07 NOTE — ED ADULT NURSE NOTE - NSIMPLEMENTINTERV_GEN_ALL_ED
Implemented All Universal Safety Interventions:  [place] to call system. Call bell, personal items and telephone within reach. Instruct patient to call for assistance. Room bathroom lighting operational. Non-slip footwear when patient is off stretcher. Physically safe environment: no spills, clutter or unnecessary equipment. Stretcher in lowest position, wheels locked, appropriate side rails in place.

## 2018-08-07 NOTE — CONSULT NOTE ADULT - ATTENDING COMMENTS
VASCULAR NEUROLOGY ATTENDING  The patient is seen and examined the history and imaging are reviewed. I agree with the resident note unless otherwise noted. Patient with known AF and concern for transient cerebrovascular event. Would continue Eliquis. Remainder of neurologic work-up as outpatient.

## 2018-08-07 NOTE — H&P ADULT - PROBLEM SELECTOR PLAN 3
[id number] Score of 5 and patient on Eliquis for anticoagulation   Continue with Metoprolol 50mg BID for rate control [id number] Score of 7 and patient on Eliquis for anticoagulation   Continue with Metoprolol 50mg BID for rate control

## 2018-08-07 NOTE — ED PROVIDER NOTE - PROGRESS NOTE DETAILS
Cabot: Neuro aware and coming to eval. Cabot: EKG has new block that was not present before.  Trop 19.  On reexam, patient does endorse increasing MCMILLAN, palpitations.  Still denies CP. Esther Nicole MD PGY-1: Spoke with cardiologist Dr. Zepeda's PA. Discussed results and disposition. Agreed to . Patient appears well on reassessment, sitting in bed eating breakfast. Esther Nicole MD PGY-1: Spoke with cardiologist Dr. Zepeda's PA. Asks pt be admitted to Dr. Connell. Spoke with Dr. Connell who will accept pt. Patient appears well on reassessment, sitting in bed eating breakfast. States SOB improved.

## 2018-08-07 NOTE — H&P ADULT - RS GEN PE MLT RESP DETAILS PC
rales/airway patent/no chest wall tenderness/no intercostal retractions/no wheezes/normal/no rhonchi/respirations non-labored

## 2018-08-07 NOTE — CONSULT NOTE ADULT - SUBJECTIVE AND OBJECTIVE BOX
HPI:  Patient is an 84 year old female with PMHx of afib on eliquis BID, CAD s/p stents, CHF, HTN, HLD who presents to ED from home for RUE weakness which she noticed this morning and lasted for one hour. last known normal was when she went to bed last night at 10PM. Sleep was unremarkable, but no dizziness, headache, vision change, no hx of RUE weakness. She states she has vertigo when she gets up in the morning but is currently asymptomatic. Currently she has no numbness in any of her extremities, also denies tingling, decreased sensation. At baseline she is able to complete all ADLs, walks with cane, and is AOx3. No recent illnesses, no head trauma.   NIHSS 1, pre-MRS 0    PAST MEDICAL & SURGICAL HISTORY:  CHF (congestive heart failure)  Atrial fibrillation  s/p Angioplasty with Stent  Coronary Artery Disease  Hyperlipemia  Hypertension  No significant past surgical history    FAMILY HISTORY:  Family history of hypertension (Father, Mother)    Allergies  No Known Allergies    Review of Systems:  CONSTITUTIONAL:  No weight loss, fever, chills, weakness or fatigue.  HEENT:  Eyes:  No visual loss, blurred vision, double vision or yellow sclerae. Ears, Nose, Throat:  No hearing loss, sneezing, congestion, runny nose or sore throat.  CARDIOVASCULAR:  No chest pain, chest pressure or chest discomfort. No palpitations or edema.  GASTROINTESTINAL:  No anorexia, nausea, vomiting or diarrhea. No abdominal pain or blood.  NEUROLOGICAL: See HPI  MUSCULOSKELETAL:  No muscle, back pain, joint pain or stiffness.  PSYCHIATRIC:  No history of depression or anxiety.    Vital Signs Last 24 Hrs  T(C): 36.9 (07 Aug 2018 08:42), Max: 37.2 (07 Aug 2018 07:23)  T(F): 98.5 (07 Aug 2018 08:42), Max: 98.9 (07 Aug 2018 07:23)  HR: 89 (07 Aug 2018 08:42) (89 - 108)  BP: 141/85 (07 Aug 2018 08:42) (141/85 - 164/94)  BP(mean): --  RR: 16 (07 Aug 2018 08:42) (16 - 16)  SpO2: 100% (07 Aug 2018 08:42) (100% - 100%)    General Exam:   General appearance: No acute distress                 Neurological Exam:  Mental Status: Orientated to self, date and place.    No dysarthria, aphasia or neglect.      Cranial Nerves: CN I - not tested.  PERRL, EOMI, VFF, no nystagmus or diplopia.  No APD.    Fundi not visualized bilaterally.    No facial asymmetry.  Hearing intact to finger rub bilaterally.     Motor:   Tone: normal.                  Strength:     [] Upper extremity                      Delt       Bicep    Tricep                                                  R         5/5        5/5        5/5       5/5                                               L          5/5        5/5        5/5       5/5  [] Lower extremity                       HF          KE          KF        DF         PF                                               R        5/5        5/5        5/5       5/5       5/5                                               L         5/5        5/5       5/5       5/5        5/5  Pronator drift: none                 Dysmetria: BL NL FTN  No truncal ataxia.    Tremor: No resting tremor    Sensation: intact to light touch    Deep Tendon Reflexes:   Right 2+ : BC, TC, BRD   Left 2+ : BC, TC, BRD  Right 2+ Knee, 1+ ankle  Left 2+ Knee, 1+ ankle    Toes flexor bilaterally    Other:  Radiology    CT head: No other significant interval changes from the prior exam. HPI:  Patient is an 84 year old female with PMHx of afib on eliquis BID, CAD s/p stents, CHF, HTN, HLD who presents to ED from home for RUE weakness which she noticed this morning and lasted for one hour. last known normal was when she went to bed last night at 10PM. Sleep was unremarkable, but no dizziness, headache, vision change, no hx of RUE weakness. She states she has vertigo when she gets up in the morning but is currently asymptomatic. Currently she has no numbness in any of her extremities, also denies tingling, decreased sensation. At baseline she is able to complete all ADLs, walks with cane, and is AOx3. No recent illnesses, no head trauma.   NIHSS 1, pre-MRS 0    PAST MEDICAL & SURGICAL HISTORY:  CHF (congestive heart failure)  Atrial fibrillation  s/p Angioplasty with Stent  Coronary Artery Disease  Hyperlipemia  Hypertension  No significant past surgical history    FAMILY HISTORY:  Family history of hypertension (Father, Mother)    Allergies  No Known Allergies    Review of Systems:  CONSTITUTIONAL:  No weight loss, fever, chills, weakness or fatigue.  HEENT:  Eyes:  No visual loss, blurred vision, double vision or yellow sclerae. Ears, Nose, Throat:  No hearing loss, sneezing, congestion, runny nose or sore throat.  CARDIOVASCULAR:  No chest pain, chest pressure or chest discomfort. No palpitations or edema.  GASTROINTESTINAL:  No anorexia, nausea, vomiting or diarrhea. No abdominal pain or blood.  NEUROLOGICAL: See HPI  MUSCULOSKELETAL:  No muscle, back pain, joint pain or stiffness.  PSYCHIATRIC:  No history of depression or anxiety.    Vital Signs Last 24 Hrs  T(C): 36.9 (07 Aug 2018 08:42), Max: 37.2 (07 Aug 2018 07:23)  T(F): 98.5 (07 Aug 2018 08:42), Max: 98.9 (07 Aug 2018 07:23)  HR: 89 (07 Aug 2018 08:42) (89 - 108)  BP: 141/85 (07 Aug 2018 08:42) (141/85 - 164/94)  BP(mean): --  RR: 16 (07 Aug 2018 08:42) (16 - 16)  SpO2: 100% (07 Aug 2018 08:42) (100% - 100%)    General Exam:   General appearance: No acute distress                 Neurological Exam:  Mental Status: Orientated to self, date and place.    No dysarthria, aphasia or neglect.      Cranial Nerves: CN I - not tested.  PERRL, EOMI, VFF, no nystagmus or diplopia.  No APD.    Fundi not visualized bilaterally.    No facial asymmetry.  Hearing intact to finger rub bilaterally.     Motor:   Tone: normal.                  Strength:     [] Upper extremity                      Delt       Bicep    Tricep                                                  R         5/5        5/5        5/5       5/5                                               L          5/5        5/5        5/5       5/5  [] Lower extremity                       HF          KE          KF        DF         PF                                               R        5/5        5/5        5/5       5/5       5/5                                               L         5/5        5/5       5/5       5/5        5/5  Pronator drift: none                 Dysmetria: BL NL FTN  No truncal ataxia.    Tremor: No resting tremor    Sensation: intact to light touch    Deep Tendon Reflexes:   Right 2+ : BC, TC, BRD   Left 2+ : BC, TC, BRD  Right 2+ Knee, 1+ ankle  Left 2+ Knee, 1+ ankle    Toes flexor bilaterally    Other:  Radiology    CT head: No acute hemorrhage. 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

## 2018-08-07 NOTE — ED ADULT NURSE REASSESSMENT NOTE - NS ED NURSE REASSESS COMMENT FT1
pt awake and alert, offering no complaints at this time, patient admitted to med/tele for CHF, pt vitals reassessed, pt being transferred to [facility], report given to RN

## 2018-08-07 NOTE — ED ADULT TRIAGE NOTE - CHIEF COMPLAINT QUOTE
Pt arrives w/ c/o awakening w/ left arm being unable to move this [place] [address]. Symptoms have since resolved. Last normal at 10pm before bed yesterday.

## 2018-08-07 NOTE — H&P ADULT - NSHPLABSRESULTS_GEN_ALL_CORE
11.0   6.60  )-----------( 232      ( 07 Aug 2018 08:34 )             37.5     08-07    142  |  105  |  20  ----------------------------<  111<H>  3.7   |  23  |  0.97    Ca    8.7      07 Aug 2018 08:34    TPro  6.8  /  Alb  3.7  /  TBili  1.0  /  DBili  x   /  AST  29  /  ALT  17  /  AlkPhos  89  08-07    CT head: No acute hemorrhage. 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 No other significant interval changes from the prior exam.    CXR: Mild interstitial edema without localized pulmonary disease    EKG: Atrial fibrillation with RVR at a rate of 105 with TWI in leads I, AVl and V6 and QTc of 454

## 2018-08-07 NOTE — H&P ADULT - NEGATIVE ENMT SYMPTOMS
no sinus symptoms/no nasal congestion/no nasal discharge/no hearing difficulty/no ear pain/no tinnitus

## 2018-08-07 NOTE — H&P ADULT - HISTORY OF PRESENT ILLNESS
83 y/o right handed F with PMH of Afib(on Eliquis), CAD(s/p 1 stent), CHF(with EF of 42%), HTN, HLD who presented with the complaint of transient right arm paralysis and numbness and MCMILLAN. As per the patient she was in her usual state of health last night at 10:00pm when she went to bed. When she had woken up in the morning she developed sudden onset right arm weakness with associated numbness. Patient stated that she was unable to lift her right arm and had to use the left arm to lift the right arm. Patient also endorsed of intermittent numbness and tingling and these symptoms lasted for about 1/2 an hour and self resolved. Patient stated that due to the weakness she was unable to get out of bed. Patient also endorsed of decreased  strength in the right hand. Patient also endorsed of dizziness and stated that this was similar to her prior episodes of vertigo. Patient denied any headaches, changes in vision, slurred speech, facial droop, contralateral weakness or numbness, or any LE weakness/numbness. Patient also endorsed of intermittent SOB and MCMILLAN for the past few days. Patient stated that any minimal exertion she would feel SOB and walking 1 block she would feel winded and would have to stop to catch her breath. Patient sleeps with 2 pillow at night which is chronic for her and also endorsed of orthopnea and PND. Patient denied any CP, fevers, chills, N/V/D/C, abdominal pain, dysuria, melena, hematochezia, recent travel, sick contact, cough, pleuritic or positional chest pain.     On ED admission EKG revealed Atrial fibrillation with RVR at a rate of 105 with TWI in leads I, AVl and V6 and QTc of 454, CE x 1: Trop: 19->18, Gluc: 111, H&H: 11.0/37.5. CT head: No acute hemorrhage. 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 No other significant interval changes from the prior exam. CXR: Mild interstitial edema without localized pulmonary disease. Patient received 1x dose of Lasix 40mg in the ED.

## 2018-08-08 LAB
BUN SERPL-MCNC: 22 MG/DL — SIGNIFICANT CHANGE UP (ref 7–23)
CALCIUM SERPL-MCNC: 9.3 MG/DL — SIGNIFICANT CHANGE UP (ref 8.4–10.5)
CHLORIDE SERPL-SCNC: 99 MMOL/L — SIGNIFICANT CHANGE UP (ref 98–107)
CHOLEST SERPL-MCNC: 198 MG/DL — SIGNIFICANT CHANGE UP (ref 120–199)
CO2 SERPL-SCNC: 18 MMOL/L — LOW (ref 22–31)
CREAT SERPL-MCNC: 1.05 MG/DL — SIGNIFICANT CHANGE UP (ref 0.5–1.3)
GLUCOSE SERPL-MCNC: 150 MG/DL — HIGH (ref 70–99)
HBA1C BLD-MCNC: 6 % — HIGH (ref 4–5.6)
HCT VFR BLD CALC: 44.6 % — SIGNIFICANT CHANGE UP (ref 34.5–45)
HDLC SERPL-MCNC: 50 MG/DL — SIGNIFICANT CHANGE UP (ref 45–65)
HGB BLD-MCNC: 13 G/DL — SIGNIFICANT CHANGE UP (ref 11.5–15.5)
LIPID PNL WITH DIRECT LDL SERPL: 129 MG/DL — SIGNIFICANT CHANGE UP
MAGNESIUM SERPL-MCNC: 2 MG/DL — SIGNIFICANT CHANGE UP (ref 1.6–2.6)
MCHC RBC-ENTMCNC: 23.6 PG — LOW (ref 27–34)
MCHC RBC-ENTMCNC: 29.1 % — LOW (ref 32–36)
MCV RBC AUTO: 80.9 FL — SIGNIFICANT CHANGE UP (ref 80–100)
NRBC # FLD: 0 — SIGNIFICANT CHANGE UP
PHOSPHATE SERPL-MCNC: 4.1 MG/DL — SIGNIFICANT CHANGE UP (ref 2.5–4.5)
PLATELET # BLD AUTO: 270 K/UL — SIGNIFICANT CHANGE UP (ref 150–400)
PMV BLD: 11.1 FL — SIGNIFICANT CHANGE UP (ref 7–13)
POTASSIUM SERPL-MCNC: 4.1 MMOL/L — SIGNIFICANT CHANGE UP (ref 3.5–5.3)
POTASSIUM SERPL-SCNC: 4.1 MMOL/L — SIGNIFICANT CHANGE UP (ref 3.5–5.3)
RBC # BLD: 5.51 M/UL — HIGH (ref 3.8–5.2)
RBC # FLD: 20.7 % — HIGH (ref 10.3–14.5)
SODIUM SERPL-SCNC: 140 MMOL/L — SIGNIFICANT CHANGE UP (ref 135–145)
TRIGL SERPL-MCNC: 175 MG/DL — HIGH (ref 10–149)
TSH SERPL-MCNC: 2.81 UIU/ML — SIGNIFICANT CHANGE UP (ref 0.27–4.2)
WBC # BLD: 11.7 K/UL — HIGH (ref 3.8–10.5)
WBC # FLD AUTO: 11.7 K/UL — HIGH (ref 3.8–10.5)

## 2018-08-08 RX ORDER — METOPROLOL TARTRATE 50 MG
50 TABLET ORAL
Qty: 0 | Refills: 0 | Status: DISCONTINUED | OUTPATIENT
Start: 2018-08-08 | End: 2018-08-12

## 2018-08-08 RX ORDER — METOPROLOL TARTRATE 50 MG
50 TABLET ORAL
Qty: 0 | Refills: 0 | Status: DISCONTINUED | OUTPATIENT
Start: 2018-08-08 | End: 2018-08-08

## 2018-08-08 RX ADMIN — APIXABAN 2.5 MILLIGRAM(S): 2.5 TABLET, FILM COATED ORAL at 17:19

## 2018-08-08 RX ADMIN — Medication 50 MILLIGRAM(S): at 06:11

## 2018-08-08 RX ADMIN — APIXABAN 2.5 MILLIGRAM(S): 2.5 TABLET, FILM COATED ORAL at 06:11

## 2018-08-08 RX ADMIN — Medication 50 MILLIGRAM(S): at 17:21

## 2018-08-08 RX ADMIN — PANTOPRAZOLE SODIUM 40 MILLIGRAM(S): 20 TABLET, DELAYED RELEASE ORAL at 06:11

## 2018-08-08 RX ADMIN — Medication 20 MILLIGRAM(S): at 06:11

## 2018-08-08 RX ADMIN — ATORVASTATIN CALCIUM 10 MILLIGRAM(S): 80 TABLET, FILM COATED ORAL at 22:22

## 2018-08-08 RX ADMIN — LISINOPRIL 2.5 MILLIGRAM(S): 2.5 TABLET ORAL at 06:10

## 2018-08-08 NOTE — SWALLOW BEDSIDE ASSESSMENT ADULT - COMMENTS
Per chart review, 85 y/o F with PMH of Afib(on Eliquis), CAD(s/p 1 stent), CHF(with EF of 42%), HTN, HLD who presented with the complaint of transient right arm paralysis and numbness and MCMILLAN. R/o TIA vs CVA vs CHF. Per additional chart review, CT head: No acute hemorrhage. 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 No other significant interval changes from the prior exam. CXR: Mild interstitial edema without localized pulmonary disease.    Patient seen for an initial bedside swallow evaluation this am at which time she was alert and cooperative. The patient was received upright in bed and denied a h/o dysphagia, coughing/choking while eating/drinking. Additionally, the patient stated she has difficulty chewing "hard things" reportedly 2/2 upper and lower dentures.

## 2018-08-08 NOTE — SWALLOW BEDSIDE ASSESSMENT ADULT - ASR SWALLOW ASPIRATION MONITOR
oral hygiene/fever/gurgly voice/pneumonia/upper respiratory infection/position upright (89Y)/cough/throat clearing

## 2018-08-08 NOTE — SWALLOW BEDSIDE ASSESSMENT ADULT - SWALLOW EVAL: DIAGNOSIS
1. The patient demonstrated a functional oral stage for puree, mechanical soft, honey thick, nectar thick, and thin liquid textures marked by adequate acceptance, bolus formation, A-P transport, and clearance. 2. The patient demonstrated a mild dysphagia for solids marked by prolonged mastication with lingual residue noted. 3. The patient demonstrated a mild pharyngeal dysphagia for puree, solids, mechanical soft, honey thick, nectar thick, and thin liquid textures marked by mildly delayed swallow trigger and mildly reduced hyolaryngeal elevation upon digital palpation. No overt s/s of airway penetration/aspiration noted across all trials. 1. The patient demonstrated a functional oral stage for puree, mechanical soft, honey thick, nectar thick, and thin liquid textures marked by adequate acceptance, bolus formation, A-P transport, and clearance. 2. The patient demonstrated a mild oral dysphagia for solids marked by prolonged mastication with lingual residue noted. 3. The patient demonstrated a mild pharyngeal dysphagia for puree, solids, mechanical soft, honey thick, nectar thick, and thin liquid textures marked by mildly delayed swallow trigger and mildly reduced hyolaryngeal elevation upon digital palpation. No overt s/s of airway penetration/aspiration noted across all trials.

## 2018-08-08 NOTE — CONSULT NOTE ADULT - SUBJECTIVE AND OBJECTIVE BOX
CHIEF COMPLAINT: dyspnea, right arm weakness    HISTORY OF PRESENT ILLNESS:  85 y/o F with PMH of Afib(on Eliquis), CAD(s/p 1 stent), CHF(with EF of 42%), HTN, HLD who presented with the complaint of transient right arm paralysis and numbness and MCMILLAN. As per the patient she was in her usual state of health last night at 10:00pm when she went to bed. When she had woken up in the morning she developed sudden onset right arm weakness with associated numbness. Patient stated that she was unable to lift her right arm and had to use the left arm to lift the right arm. Patient also endorsed of intermittent numbness and tingling and these symptoms lasted for about 1/2 an hour and self resolved. Patient stated that due to the weakness she was unable to get out of bed. Patient also endorsed of decreased  strength in the right hand. Patient also endorsed of dizziness and stated that this was similar to her prior episodes of vertigo. Patient denied any headaches, changes in vision, slurred speech, facial droop, contralateral weakness or numbness, or any LE weakness/numbness. Patient also endorsed of intermittent SOB and MCMILLAN for the past few days. Patient stated that any minimal exertion she would feel SOB and walking 1 block she would feel winded and would have to stop to catch her breath. Patient sleeps with 2 pillow at night which is chronic for her and also endorsed of orthopnea and PND. Patient denied any CP, fevers, chills, N/V/D/C, abdominal pain, dysuria, melena, hematochezia, recent travel, sick contact, cough, pleuritic or positional chest pain.     On ED admission EKG revealed Atrial fibrillation with RVR at a rate of 105 with TWI in leads I, AVl and V6 and QTc of 454, CE x 1: Trop: 19->18, Gluc: 111, H&H: 11.0/37.5. CT head: No acute hemorrhage. 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 No other significant interval changes from the prior exam. CXR: Mild interstitial edema without localized pulmonary disease. Patient received 1x dose of Lasix 40mg in the ED.     Allergies  No Known Allergies      MEDICATIONS:  apixaban 2.5 milliGRAM(s) Oral every 12 hours  furosemide   Injectable 20 milliGRAM(s) IV Push daily  lisinopril 2.5 milliGRAM(s) Oral daily  metoprolol tartrate 50 milliGRAM(s) Oral two times a day  pantoprazole    Tablet 40 milliGRAM(s) Oral before breakfast  atorvastatin 10 milliGRAM(s) Oral at bedtime        PAST MEDICAL & SURGICAL HISTORY:  CHF (congestive heart failure)  Atrial fibrillation  s/p Angioplasty with Stent  Coronary Artery Disease  Hyperlipemia  Hypertension  No significant past surgical history      FAMILY HISTORY:  Family history of hypertension (Mother)      SOCIAL HISTORY:    Non-smoker. independent in ADL's      REVIEW OF SYSTEMS:  See HPI, otherwise complete 10 point review of systems negative    [ ] All others negative	  [ ] Unable to obtain    PHYSICAL EXAM:  T(C): 36.1 (08-08-18 @ 05:41), Max: 36.9 (08-07-18 @ 08:42)  HR: 100 (08-08-18 @ 05:41) (89 - 120)  BP: 155/78 (08-08-18 @ 05:41) (130/94 - 157/99)  RR: 20 (08-08-18 @ 05:41) (16 - 20)  SpO2: 97% (08-08-18 @ 05:41) (97% - 100%)  Wt(kg): --  I&O's Summary    07 Aug 2018 07:01  -  08 Aug 2018 07:00  --------------------------------------------------------  IN: 350 mL / OUT: 0 mL / NET: 350 mL        Appearance: No Acute Distress	  HEENT:  Normal oral mucosa, PERRL, EOMI	  Cardiovascular: Normal S1 S2, No JVD, No murmurs/rubs/gallops  Respiratory: Lungs clear to auscultation bilaterally  Gastrointestinal:  Soft, Non-tender, + BS	  Skin: No rashes, No ecchymoses, No cyanosis	  Neurologic: Non-focal  Extremities: No clubbing, cyanosis or edema  Vascular: Peripheral pulses palpable 2+ bilaterally  Psychiatry: A & O x 3, Mood & affect appropriate    Laboratory Data:	 	    CBC Full  -  ( 08 Aug 2018 07:00 )  WBC Count : 11.70 K/uL  Hemoglobin : 13.0 g/dL  Hematocrit : 44.6 %  Platelet Count - Automated : 270 K/uL      08-07    142  |  105  |  20  ----------------------------<  111<H>  3.7   |  23  |  0.97    Ca    8.7      07 Aug 2018 08:34    TPro  6.8  /  Alb  3.7  /  TBili  1.0  /  DBili  x   /  AST  29  /  ALT  17  /  AlkPhos  89  08-07      proBNP: Serum Pro-Brain Natriuretic Peptide: 4114 pg/mL (08-07 @ 09:34)    Lipid Profile:   HgA1c: Hemoglobin A1C, Whole Blood: 6.0 % (08-08 @ 07:00)    CARDIAC MARKERS: hstrop 18      Interpretation of Telemetry: af 100's	    ECG:  afib with rvr anteroseptal q waves	iLBBB  RADIOLOGY:  OTHER: 	    PREVIOUS DIAGNOSTIC TESTING:    [ ] Echocardiogram:  [ ] Catheterization:  [ ] Stress Test:  	    Assessment:  85 y/o F with PMH of Afib(on Eliquis), CAD(s/p 1 stent), systolic HF (with EF of 42%), HTN, HLD who presented with the complaint of transient right arm paralysis and numbness and MCMILLAN consistent with possible TIA and acute on chronic systolic heart failure exacerbation    1) Acute on chronic systolic HF  -c/w lasix 20mg IV bid. may need to increase to 40mg IV bid. need careful monitoring of I/O, weights, BMP and electroloytes  -would change metop tartrate to metop xl 50mg bid for tx of systolic HF and also to allow for improved rate control as her CHF may possibly be exacerbated by her underling afib with RVR. her tachycardia doesnt appear compensatory or secondary to a low flow state  -c/w lisinopril 2.5mg for now and uptitrate as tolerates    2) AF with RVR  -c/w eliquis as dosed  -change metop tartrate to 50mg xl BID  -tele monitoring while adjusting rate control meds  -please check TSH    3) possible TIA  -atypical features  -neuro consult appreciated. f/u neuro recs      Greater than 60 minutes spent on total encounter; more than 50% of the visit was spent counseling and/or coordinating care by the attending physician.   	  Fernando Masters MD   Cardiovascular Diseases  [phone] CHIEF COMPLAINT: dyspnea, right arm weakness    HISTORY OF PRESENT ILLNESS:  85 y/o F with PMH of Afib(on Eliquis), CAD(s/p 1 stent), CHF(with EF of 42%), HTN, HLD who presented with the complaint of transient right arm paralysis and numbness and MCMILLAN. As per the patient she was in her usual state of health last night at 10:00pm when she went to bed. When she had woken up in the morning she developed sudden onset right arm weakness with associated numbness. Patient stated that she was unable to lift her right arm and had to use the left arm to lift the right arm. Patient also endorsed of intermittent numbness and tingling and these symptoms lasted for about 1/2 an hour and self resolved. Patient stated that due to the weakness she was unable to get out of bed. Patient also endorsed of decreased  strength in the right hand. Patient also endorsed of dizziness and stated that this was similar to her prior episodes of vertigo. Patient denied any headaches, changes in vision, slurred speech, facial droop, contralateral weakness or numbness, or any LE weakness/numbness. Patient also endorsed of intermittent SOB and MCMILLAN for the past few days. Patient stated that any minimal exertion she would feel SOB and walking 1 block she would feel winded and would have to stop to catch her breath. Patient sleeps with 2 pillow at night which is chronic for her and also endorsed of orthopnea and PND. Patient denied any CP, fevers, chills, N/V/D/C, abdominal pain, dysuria, melena, hematochezia, recent travel, sick contact, cough, pleuritic or positional chest pain.     On ED admission EKG revealed Atrial fibrillation with RVR at a rate of 105 with TWI in leads I, AVl and V6 and QTc of 454, CE x 1: Trop: 19->18, Gluc: 111, H&H: 11.0/37.5. CT head: No acute hemorrhage. 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 No other significant interval changes from the prior exam. CXR: Mild interstitial edema without localized pulmonary disease. Patient received 1x dose of Lasix 40mg in the ED.     Allergies  No Known Allergies      MEDICATIONS:  apixaban 2.5 milliGRAM(s) Oral every 12 hours  furosemide   Injectable 20 milliGRAM(s) IV Push daily  lisinopril 2.5 milliGRAM(s) Oral daily  metoprolol tartrate 50 milliGRAM(s) Oral two times a day  pantoprazole    Tablet 40 milliGRAM(s) Oral before breakfast  atorvastatin 10 milliGRAM(s) Oral at bedtime        PAST MEDICAL & SURGICAL HISTORY:  CHF (congestive heart failure)  Atrial fibrillation  s/p Angioplasty with Stent  Coronary Artery Disease  Hyperlipemia  Hypertension  No significant past surgical history      FAMILY HISTORY:  Family history of hypertension (Mother)      SOCIAL HISTORY:    Non-smoker. independent in ADL's      REVIEW OF SYSTEMS:  See HPI, otherwise complete 10 point review of systems negative    [ ] All others negative	  [ ] Unable to obtain    PHYSICAL EXAM:  T(C): 36.1 (08-08-18 @ 05:41), Max: 36.9 (08-07-18 @ 08:42)  HR: 100 (08-08-18 @ 05:41) (89 - 120)  BP: 155/78 (08-08-18 @ 05:41) (130/94 - 157/99)  RR: 20 (08-08-18 @ 05:41) (16 - 20)  SpO2: 97% (08-08-18 @ 05:41) (97% - 100%)  Wt(kg): --  I&O's Summary    07 Aug 2018 07:01  -  08 Aug 2018 07:00  --------------------------------------------------------  IN: 350 mL / OUT: 0 mL / NET: 350 mL        Appearance: No Acute Distress	  HEENT:  Normal oral mucosa, PERRL, EOMI	  Cardiovascular: Normal S1 S2, No JVD, No murmurs/rubs/gallops  Respiratory: Lungs clear to auscultation bilaterally  Gastrointestinal:  Soft, Non-tender, + BS	  Skin: No rashes, No ecchymoses, No cyanosis	  Neurologic: Non-focal  Extremities: No clubbing, cyanosis or edema  Vascular: Peripheral pulses palpable 2+ bilaterally  Psychiatry: A & O x 3, Mood & affect appropriate    Laboratory Data:	 	    CBC Full  -  ( 08 Aug 2018 07:00 )  WBC Count : 11.70 K/uL  Hemoglobin : 13.0 g/dL  Hematocrit : 44.6 %  Platelet Count - Automated : 270 K/uL      08-07    142  |  105  |  20  ----------------------------<  111<H>  3.7   |  23  |  0.97    Ca    8.7      07 Aug 2018 08:34    TPro  6.8  /  Alb  3.7  /  TBili  1.0  /  DBili  x   /  AST  29  /  ALT  17  /  AlkPhos  89  08-07      proBNP: Serum Pro-Brain Natriuretic Peptide: 4114 pg/mL (08-07 @ 09:34)    Lipid Profile:   HgA1c: Hemoglobin A1C, Whole Blood: 6.0 % (08-08 @ 07:00)    CARDIAC MARKERS: hstrop 18      Interpretation of Telemetry: af 100's	    ECG:  afib with rvr anteroseptal q waves	iLBBB  RADIOLOGY:  OTHER: 	    PREVIOUS DIAGNOSTIC TESTING:    [ ] Echocardiogram:  [ ] Catheterization:  [ ] Stress Test:  	    Assessment:  85 y/o F with PMH of Afib(on Eliquis), CAD(s/p 1 stent), systolic HF (with EF of 42%), HTN, HLD who presented with the complaint of transient right arm paralysis and numbness and MCMILLAN consistent with possible TIA and acute on chronic systolic heart failure exacerbation    1) Acute on chronic systolic HF, mod LV dysfunction, mod-severe MR  -c/w lasix 20mg IV bid. may need to increase to 40mg IV bid. need careful monitoring of I/O, weights, BMP and electroloytes  -would change metop tartrate to metop xl 50mg bid for tx of systolic HF and also to allow for improved rate control as her CHF may possibly be exacerbated by her underling afib with RVR. her tachycardia doesnt appear compensatory or secondary to a low flow state  -c/w lisinopril 2.5mg for now and uptitrate as tolerates  -would repeat TTE. notable recent drop in LVEF with worsening of MR    2) AF with RVR  -c/w eliquis as dosed  -change metop tartrate to 50mg xl BID  -tele monitoring while adjusting rate control meds  -please check TSH    3) possible TIA  -atypical features  -neuro consult appreciated. f/u neuro recs      Greater than 60 minutes spent on total encounter; more than 50% of the visit was spent counseling and/or coordinating care by the attending physician.   	  Fernando Masters MD   Cardiovascular Diseases  [phone]

## 2018-08-09 LAB
BUN SERPL-MCNC: 29 MG/DL — HIGH (ref 7–23)
CALCIUM SERPL-MCNC: 8.4 MG/DL — SIGNIFICANT CHANGE UP (ref 8.4–10.5)
CHLORIDE SERPL-SCNC: 105 MMOL/L — SIGNIFICANT CHANGE UP (ref 98–107)
CO2 SERPL-SCNC: 21 MMOL/L — LOW (ref 22–31)
CREAT SERPL-MCNC: 1.05 MG/DL — SIGNIFICANT CHANGE UP (ref 0.5–1.3)
GLUCOSE SERPL-MCNC: 101 MG/DL — HIGH (ref 70–99)
HCT VFR BLD CALC: 34.6 % — SIGNIFICANT CHANGE UP (ref 34.5–45)
HGB BLD-MCNC: 10.3 G/DL — LOW (ref 11.5–15.5)
MAGNESIUM SERPL-MCNC: 2 MG/DL — SIGNIFICANT CHANGE UP (ref 1.6–2.6)
MCHC RBC-ENTMCNC: 23.7 PG — LOW (ref 27–34)
MCHC RBC-ENTMCNC: 29.8 % — LOW (ref 32–36)
MCV RBC AUTO: 79.5 FL — LOW (ref 80–100)
NRBC # FLD: 0 — SIGNIFICANT CHANGE UP
PLATELET # BLD AUTO: 200 K/UL — SIGNIFICANT CHANGE UP (ref 150–400)
PMV BLD: 11.1 FL — SIGNIFICANT CHANGE UP (ref 7–13)
POTASSIUM SERPL-MCNC: 3.6 MMOL/L — SIGNIFICANT CHANGE UP (ref 3.5–5.3)
POTASSIUM SERPL-SCNC: 3.6 MMOL/L — SIGNIFICANT CHANGE UP (ref 3.5–5.3)
RBC # BLD: 4.35 M/UL — SIGNIFICANT CHANGE UP (ref 3.8–5.2)
RBC # FLD: 19.8 % — HIGH (ref 10.3–14.5)
SODIUM SERPL-SCNC: 140 MMOL/L — SIGNIFICANT CHANGE UP (ref 135–145)
WBC # BLD: 6.37 K/UL — SIGNIFICANT CHANGE UP (ref 3.8–10.5)
WBC # FLD AUTO: 6.37 K/UL — SIGNIFICANT CHANGE UP (ref 3.8–10.5)

## 2018-08-09 RX ADMIN — ATORVASTATIN CALCIUM 10 MILLIGRAM(S): 80 TABLET, FILM COATED ORAL at 22:01

## 2018-08-09 RX ADMIN — Medication 50 MILLIGRAM(S): at 06:32

## 2018-08-09 RX ADMIN — Medication 50 MILLIGRAM(S): at 17:52

## 2018-08-09 RX ADMIN — Medication 20 MILLIGRAM(S): at 06:33

## 2018-08-09 RX ADMIN — APIXABAN 2.5 MILLIGRAM(S): 2.5 TABLET, FILM COATED ORAL at 06:32

## 2018-08-09 RX ADMIN — APIXABAN 2.5 MILLIGRAM(S): 2.5 TABLET, FILM COATED ORAL at 17:52

## 2018-08-09 RX ADMIN — LISINOPRIL 2.5 MILLIGRAM(S): 2.5 TABLET ORAL at 06:32

## 2018-08-09 RX ADMIN — PANTOPRAZOLE SODIUM 40 MILLIGRAM(S): 20 TABLET, DELAYED RELEASE ORAL at 06:32

## 2018-08-10 LAB
BASOPHILS # BLD AUTO: 0.05 K/UL — SIGNIFICANT CHANGE UP (ref 0–0.2)
BASOPHILS NFR BLD AUTO: 0.8 % — SIGNIFICANT CHANGE UP (ref 0–2)
BUN SERPL-MCNC: 28 MG/DL — HIGH (ref 7–23)
CALCIUM SERPL-MCNC: 8.8 MG/DL — SIGNIFICANT CHANGE UP (ref 8.4–10.5)
CHLORIDE SERPL-SCNC: 104 MMOL/L — SIGNIFICANT CHANGE UP (ref 98–107)
CO2 SERPL-SCNC: 25 MMOL/L — SIGNIFICANT CHANGE UP (ref 22–31)
CREAT SERPL-MCNC: 1.03 MG/DL — SIGNIFICANT CHANGE UP (ref 0.5–1.3)
EOSINOPHIL # BLD AUTO: 0.14 K/UL — SIGNIFICANT CHANGE UP (ref 0–0.5)
EOSINOPHIL NFR BLD AUTO: 2.1 % — SIGNIFICANT CHANGE UP (ref 0–6)
FERRITIN SERPL-MCNC: 48.85 NG/ML — SIGNIFICANT CHANGE UP (ref 15–150)
GLUCOSE SERPL-MCNC: 101 MG/DL — HIGH (ref 70–99)
HCT VFR BLD CALC: 39.1 % — SIGNIFICANT CHANGE UP (ref 34.5–45)
HGB BLD-MCNC: 11.7 G/DL — SIGNIFICANT CHANGE UP (ref 11.5–15.5)
IMM GRANULOCYTES # BLD AUTO: 0.04 # — SIGNIFICANT CHANGE UP
IMM GRANULOCYTES NFR BLD AUTO: 0.6 % — SIGNIFICANT CHANGE UP (ref 0–1.5)
IRON SATN MFR SERPL: 26 UG/DL — LOW (ref 30–160)
IRON SATN MFR SERPL: 335 UG/DL — SIGNIFICANT CHANGE UP (ref 140–530)
LYMPHOCYTES # BLD AUTO: 1.89 K/UL — SIGNIFICANT CHANGE UP (ref 1–3.3)
LYMPHOCYTES # BLD AUTO: 28.8 % — SIGNIFICANT CHANGE UP (ref 13–44)
MAGNESIUM SERPL-MCNC: 2 MG/DL — SIGNIFICANT CHANGE UP (ref 1.6–2.6)
MCHC RBC-ENTMCNC: 24.4 PG — LOW (ref 27–34)
MCHC RBC-ENTMCNC: 29.9 % — LOW (ref 32–36)
MCV RBC AUTO: 81.6 FL — SIGNIFICANT CHANGE UP (ref 80–100)
MONOCYTES # BLD AUTO: 0.57 K/UL — SIGNIFICANT CHANGE UP (ref 0–0.9)
MONOCYTES NFR BLD AUTO: 8.7 % — SIGNIFICANT CHANGE UP (ref 2–14)
NEUTROPHILS # BLD AUTO: 3.87 K/UL — SIGNIFICANT CHANGE UP (ref 1.8–7.4)
NEUTROPHILS NFR BLD AUTO: 59 % — SIGNIFICANT CHANGE UP (ref 43–77)
NRBC # FLD: 0 — SIGNIFICANT CHANGE UP
PLATELET # BLD AUTO: 225 K/UL — SIGNIFICANT CHANGE UP (ref 150–400)
PMV BLD: 10.9 FL — SIGNIFICANT CHANGE UP (ref 7–13)
POTASSIUM SERPL-MCNC: 3.3 MMOL/L — LOW (ref 3.5–5.3)
POTASSIUM SERPL-SCNC: 3.3 MMOL/L — LOW (ref 3.5–5.3)
RBC # BLD: 4.79 M/UL — SIGNIFICANT CHANGE UP (ref 3.8–5.2)
RBC # FLD: 20.1 % — HIGH (ref 10.3–14.5)
SODIUM SERPL-SCNC: 143 MMOL/L — SIGNIFICANT CHANGE UP (ref 135–145)
UIBC SERPL-MCNC: 309.3 UG/DL — SIGNIFICANT CHANGE UP (ref 110–370)
WBC # BLD: 6.56 K/UL — SIGNIFICANT CHANGE UP (ref 3.8–10.5)
WBC # FLD AUTO: 6.56 K/UL — SIGNIFICANT CHANGE UP (ref 3.8–10.5)

## 2018-08-10 PROCEDURE — 93306 TTE W/DOPPLER COMPLETE: CPT | Mod: 26

## 2018-08-10 RX ORDER — FUROSEMIDE 40 MG
40 TABLET ORAL DAILY
Qty: 0 | Refills: 0 | Status: DISCONTINUED | OUTPATIENT
Start: 2018-08-11 | End: 2018-08-12

## 2018-08-10 RX ORDER — POTASSIUM CHLORIDE 20 MEQ
40 PACKET (EA) ORAL EVERY 4 HOURS
Qty: 0 | Refills: 0 | Status: COMPLETED | OUTPATIENT
Start: 2018-08-10 | End: 2018-08-10

## 2018-08-10 RX ORDER — LISINOPRIL 2.5 MG/1
5 TABLET ORAL DAILY
Qty: 0 | Refills: 0 | Status: DISCONTINUED | OUTPATIENT
Start: 2018-08-11 | End: 2018-08-12

## 2018-08-10 RX ORDER — LISINOPRIL 2.5 MG/1
2.5 TABLET ORAL ONCE
Qty: 0 | Refills: 0 | Status: COMPLETED | OUTPATIENT
Start: 2018-08-10 | End: 2018-08-10

## 2018-08-10 RX ADMIN — LISINOPRIL 2.5 MILLIGRAM(S): 2.5 TABLET ORAL at 06:20

## 2018-08-10 RX ADMIN — LISINOPRIL 2.5 MILLIGRAM(S): 2.5 TABLET ORAL at 09:06

## 2018-08-10 RX ADMIN — ATORVASTATIN CALCIUM 10 MILLIGRAM(S): 80 TABLET, FILM COATED ORAL at 21:35

## 2018-08-10 RX ADMIN — APIXABAN 2.5 MILLIGRAM(S): 2.5 TABLET, FILM COATED ORAL at 17:25

## 2018-08-10 RX ADMIN — APIXABAN 2.5 MILLIGRAM(S): 2.5 TABLET, FILM COATED ORAL at 06:20

## 2018-08-10 RX ADMIN — Medication 20 MILLIGRAM(S): at 06:17

## 2018-08-10 RX ADMIN — Medication 50 MILLIGRAM(S): at 06:17

## 2018-08-10 RX ADMIN — PANTOPRAZOLE SODIUM 40 MILLIGRAM(S): 20 TABLET, DELAYED RELEASE ORAL at 06:17

## 2018-08-10 RX ADMIN — Medication 40 MILLIEQUIVALENT(S): at 09:06

## 2018-08-10 RX ADMIN — Medication 50 MILLIGRAM(S): at 17:25

## 2018-08-10 RX ADMIN — Medication 40 MILLIEQUIVALENT(S): at 11:40

## 2018-08-10 NOTE — CHART NOTE - NSCHARTNOTEFT_GEN_A_CORE
NUTRITION SERVICES     Upon Nutritional Assessment by the Registered Dietitian your patient was determined to meet criteria/ has evidence of the following diagnosis/diagnoses:  [ ] Mild Protein Calorie Malnutrition   [ ] Moderate Protein Calorie Malnutrition   [X ] Severe Protein Calorie Malnutrition   [ ] Unspecified Protein Calorie Malnutrition   [ ] Underweight / BMI <19  [ ] Morbid Obesity / BMI >40    Findings as based on:  •  Comprehensive nutritional assessment and consultation    Please refer to Initial Dietitian Evaluation via documents section of [employer] for further recommendations.    Jagdish Quintero RD,CD/N,CDE

## 2018-08-10 NOTE — DIETITIAN INITIAL EVALUATION ADULT. - NS AS NUTRI INTERV MEALS SNACK
1. Recommend continue with current diet. 2. Recommend Glucerna Shake 2x daily. 1. Recommend change diet to Low Na, Mechanical Soft, 1000 ml Fluid Restriction. 2. Recommend Glucerna Shake 2x daily.

## 2018-08-10 NOTE — DIETITIAN INITIAL EVALUATION ADULT. - FACTORS AFF FOOD INTAKE
difficulty chewing/difficulty with food procurement/preparation/[place]/ethnic/cultural/personal food preferences

## 2018-08-10 NOTE — DIETITIAN INITIAL EVALUATION ADULT. - PROBLEM SELECTOR PLAN 3
[id number] Score of 7 and patient on Eliquis for anticoagulation   Continue with Metoprolol 50mg BID for rate control

## 2018-08-10 NOTE — DIETITIAN INITIAL EVALUATION ADULT. - DIET TYPE
DASH/TLC (sodium and cholesterol restricted diet)/low fat/1000ml/regular/dysphagia 2, mechanical soft, thin liquids

## 2018-08-10 NOTE — DIETITIAN INITIAL EVALUATION ADULT. - OTHER INFO
85 y/o F with PMH of Afib(on Eliquis), CAD(s/p 1 stent), CHF(with EF of 42%), HTN, HLD who presented with the complaint of transient right arm paralysis and numbness and MCMILLAN. R/o TIA vs CVA vs CHF.    Pt remains with poor po intakes. Denies swallowing difficulties or any nausea, vomiting, diarrhea, constipation. Pt denies wt loss, reports last wt was 120#. Chart reviewed, from previous admits and noted with variable wts: (2/18) 140#, 127#. (4/18) 115#. Noted SLP evaluation 8/8/18---Recommendations for Mechanical Soft diet with thin liquids. Pt was educated on importance of continuing with low na diet and advised on fluid restriction orders as well.

## 2018-08-11 LAB
BUN SERPL-MCNC: 27 MG/DL — HIGH (ref 7–23)
CALCIUM SERPL-MCNC: 8.8 MG/DL — SIGNIFICANT CHANGE UP (ref 8.4–10.5)
CHLORIDE SERPL-SCNC: 107 MMOL/L — SIGNIFICANT CHANGE UP (ref 98–107)
CO2 SERPL-SCNC: 21 MMOL/L — LOW (ref 22–31)
CREAT SERPL-MCNC: 0.89 MG/DL — SIGNIFICANT CHANGE UP (ref 0.5–1.3)
GLUCOSE SERPL-MCNC: 107 MG/DL — HIGH (ref 70–99)
HCT VFR BLD CALC: 36 % — SIGNIFICANT CHANGE UP (ref 34.5–45)
HGB BLD-MCNC: 10.6 G/DL — LOW (ref 11.5–15.5)
MAGNESIUM SERPL-MCNC: 1.9 MG/DL — SIGNIFICANT CHANGE UP (ref 1.6–2.6)
MCHC RBC-ENTMCNC: 23.5 PG — LOW (ref 27–34)
MCHC RBC-ENTMCNC: 29.4 % — LOW (ref 32–36)
MCV RBC AUTO: 79.8 FL — LOW (ref 80–100)
NRBC # FLD: 0 — SIGNIFICANT CHANGE UP
PLATELET # BLD AUTO: 210 K/UL — SIGNIFICANT CHANGE UP (ref 150–400)
PMV BLD: 10.8 FL — SIGNIFICANT CHANGE UP (ref 7–13)
POTASSIUM SERPL-MCNC: 4 MMOL/L — SIGNIFICANT CHANGE UP (ref 3.5–5.3)
POTASSIUM SERPL-SCNC: 4 MMOL/L — SIGNIFICANT CHANGE UP (ref 3.5–5.3)
RBC # BLD: 4.51 M/UL — SIGNIFICANT CHANGE UP (ref 3.8–5.2)
RBC # FLD: 19.7 % — HIGH (ref 10.3–14.5)
SODIUM SERPL-SCNC: 143 MMOL/L — SIGNIFICANT CHANGE UP (ref 135–145)
WBC # BLD: 7.22 K/UL — SIGNIFICANT CHANGE UP (ref 3.8–10.5)
WBC # FLD AUTO: 7.22 K/UL — SIGNIFICANT CHANGE UP (ref 3.8–10.5)

## 2018-08-11 RX ADMIN — APIXABAN 2.5 MILLIGRAM(S): 2.5 TABLET, FILM COATED ORAL at 06:16

## 2018-08-11 RX ADMIN — Medication 50 MILLIGRAM(S): at 17:38

## 2018-08-11 RX ADMIN — Medication 50 MILLIGRAM(S): at 06:16

## 2018-08-11 RX ADMIN — PANTOPRAZOLE SODIUM 40 MILLIGRAM(S): 20 TABLET, DELAYED RELEASE ORAL at 06:16

## 2018-08-11 RX ADMIN — APIXABAN 2.5 MILLIGRAM(S): 2.5 TABLET, FILM COATED ORAL at 17:39

## 2018-08-11 RX ADMIN — LISINOPRIL 5 MILLIGRAM(S): 2.5 TABLET ORAL at 06:18

## 2018-08-11 RX ADMIN — ATORVASTATIN CALCIUM 10 MILLIGRAM(S): 80 TABLET, FILM COATED ORAL at 21:31

## 2018-08-11 RX ADMIN — Medication 40 MILLIGRAM(S): at 06:16

## 2018-08-11 NOTE — PROGRESS NOTE ADULT - ASSESSMENT
1) Acute on chronic systolic HF, mod LV dysfunction, mod-severe MR  -c/w lasix 40mg PO daily  -c/w metop xl 50mg bid for tx of systolic HF and also to allow for improved rate control as her CHF may possibly be exacerbated by her underling afib with RVR. her tachycardia doesnt appear compensatory or secondary to a low flow state  -c/w lisinopril 5mg for now   -would repeat TTE. notable recent drop in LVEF with worsening of MR    2) AF with RVR  -c/w eliquis as dosed  -cont metop tartrate to 50mg xl BID  -tele monitoring while adjusting rate control meds    3) possible TIA  -atypical features  -neuro consult appreciated. f/u neuro recs    4) microcytic anemia while on AC  -would check iron levels

## 2018-08-12 ENCOUNTER — TRANSCRIPTION ENCOUNTER (OUTPATIENT)
Age: 83
End: 2018-08-12

## 2018-08-12 VITALS
OXYGEN SATURATION: 100 % | TEMPERATURE: 98 F | DIASTOLIC BLOOD PRESSURE: 83 MMHG | RESPIRATION RATE: 18 BRPM | SYSTOLIC BLOOD PRESSURE: 119 MMHG | HEART RATE: 80 BPM

## 2018-08-12 LAB
BUN SERPL-MCNC: 26 MG/DL — HIGH (ref 7–23)
CALCIUM SERPL-MCNC: 9 MG/DL — SIGNIFICANT CHANGE UP (ref 8.4–10.5)
CHLORIDE SERPL-SCNC: 102 MMOL/L — SIGNIFICANT CHANGE UP (ref 98–107)
CO2 SERPL-SCNC: 22 MMOL/L — SIGNIFICANT CHANGE UP (ref 22–31)
CREAT SERPL-MCNC: 0.87 MG/DL — SIGNIFICANT CHANGE UP (ref 0.5–1.3)
GLUCOSE SERPL-MCNC: 104 MG/DL — HIGH (ref 70–99)
HCT VFR BLD CALC: 38.4 % — SIGNIFICANT CHANGE UP (ref 34.5–45)
HGB BLD-MCNC: 11.7 G/DL — SIGNIFICANT CHANGE UP (ref 11.5–15.5)
MAGNESIUM SERPL-MCNC: 1.9 MG/DL — SIGNIFICANT CHANGE UP (ref 1.6–2.6)
MCHC RBC-ENTMCNC: 24 PG — LOW (ref 27–34)
MCHC RBC-ENTMCNC: 30.5 % — LOW (ref 32–36)
MCV RBC AUTO: 78.9 FL — LOW (ref 80–100)
NRBC # FLD: 0 — SIGNIFICANT CHANGE UP
PLATELET # BLD AUTO: 214 K/UL — SIGNIFICANT CHANGE UP (ref 150–400)
PMV BLD: 11.5 FL — SIGNIFICANT CHANGE UP (ref 7–13)
POTASSIUM SERPL-MCNC: 3.7 MMOL/L — SIGNIFICANT CHANGE UP (ref 3.5–5.3)
POTASSIUM SERPL-SCNC: 3.7 MMOL/L — SIGNIFICANT CHANGE UP (ref 3.5–5.3)
RBC # BLD: 4.87 M/UL — SIGNIFICANT CHANGE UP (ref 3.8–5.2)
RBC # FLD: 20.1 % — HIGH (ref 10.3–14.5)
SODIUM SERPL-SCNC: 140 MMOL/L — SIGNIFICANT CHANGE UP (ref 135–145)
WBC # BLD: 7.32 K/UL — SIGNIFICANT CHANGE UP (ref 3.8–10.5)
WBC # FLD AUTO: 7.32 K/UL — SIGNIFICANT CHANGE UP (ref 3.8–10.5)

## 2018-08-12 RX ORDER — FUROSEMIDE 40 MG
1 TABLET ORAL
Qty: 0 | Refills: 0 | COMMUNITY
Start: 2018-08-12

## 2018-08-12 RX ORDER — APIXABAN 2.5 MG/1
1 TABLET, FILM COATED ORAL
Qty: 0 | Refills: 0 | COMMUNITY
Start: 2018-08-12

## 2018-08-12 RX ORDER — METOPROLOL TARTRATE 50 MG
1 TABLET ORAL
Qty: 0 | Refills: 0 | COMMUNITY

## 2018-08-12 RX ORDER — METOPROLOL TARTRATE 50 MG
1 TABLET ORAL
Qty: 0 | Refills: 0 | COMMUNITY
Start: 2018-08-12

## 2018-08-12 RX ORDER — ATORVASTATIN CALCIUM 80 MG/1
1 TABLET, FILM COATED ORAL
Qty: 0 | Refills: 0 | COMMUNITY
Start: 2018-08-12

## 2018-08-12 RX ORDER — ATORVASTATIN CALCIUM 80 MG/1
1 TABLET, FILM COATED ORAL
Qty: 0 | Refills: 0 | COMMUNITY

## 2018-08-12 RX ORDER — LISINOPRIL 2.5 MG/1
1 TABLET ORAL
Qty: 30 | Refills: 0 | OUTPATIENT
Start: 2018-08-12 | End: 2018-09-10

## 2018-08-12 RX ORDER — FUROSEMIDE 40 MG
1 TABLET ORAL
Qty: 0 | Refills: 0 | COMMUNITY

## 2018-08-12 RX ADMIN — LISINOPRIL 5 MILLIGRAM(S): 2.5 TABLET ORAL at 06:37

## 2018-08-12 RX ADMIN — PANTOPRAZOLE SODIUM 40 MILLIGRAM(S): 20 TABLET, DELAYED RELEASE ORAL at 06:37

## 2018-08-12 RX ADMIN — Medication 40 MILLIGRAM(S): at 06:37

## 2018-08-12 RX ADMIN — APIXABAN 2.5 MILLIGRAM(S): 2.5 TABLET, FILM COATED ORAL at 06:37

## 2018-08-12 RX ADMIN — Medication 50 MILLIGRAM(S): at 06:37

## 2018-08-12 NOTE — DISCHARGE NOTE ADULT - MEDICATION SUMMARY - MEDICATIONS TO CHANGE
I will SWITCH the dose or number of times a day I take the medications listed below when I get home from the hospital:    furosemide 40 mg oral tablet  -- 1 tab(s) by mouth 2 times a day    lisinopril 2.5 mg oral tablet  -- 1 tab(s) by mouth once a day

## 2018-08-12 NOTE — DISCHARGE NOTE ADULT - MEDICATION SUMMARY - MEDICATIONS TO TAKE
I will START or STAY ON the medications listed below when I get home from the hospital:    lisinopril 5 mg oral tablet  -- 1 tab(s) by mouth once a day  -- Indication: For Essential hypertension    apixaban 2.5 mg oral tablet  -- 1 tab(s) by mouth every 12 hours  -- Indication: For Chronic atrial fibrillation    atorvastatin 10 mg oral tablet  -- 1 tab(s) by mouth once a day (at bedtime)  -- Indication: For HLD (hyperlipidemia)    metoprolol tartrate 50 mg oral tablet  -- 1 tab(s) by mouth 2 times a day  -- Indication: For Essential hypertension    furosemide 40 mg oral tablet  -- 1 tab(s) by mouth once a day  -- Indication: For CHF (congestive heart failure)    pantoprazole 40 mg oral delayed release tablet  -- 1 tab(s) by mouth once a day  -- Indication: For GERD

## 2018-08-12 NOTE — DISCHARGE NOTE ADULT - PLAN OF CARE
To relieve and prevent worsening symptoms associated with congestive heart failure, to improve quality of life, and to treat underlying conditions such as coronary heart disease, high blood pressure, or diabetes, and to maintain euvolemia. Low salt diet, fluid restriction to 1500 ml daily, monitor your fluid intake and weight daily, exercise as tolerated 30 minutes daily, and follow up with your physician within 1 to 2 weeks. To restore or maintain a normal heart rate and rhythm, to prevent blood clots, and decrease the risks of stroke CVA/TIA. Please take your medications as prescribed.  Continue to take your blood thinner as prescribed and follow with your physician.  Low fat diet, reduce caffeine intake, and exercise at least 30 minutes daily. To maintain a normal blood pressure to prevent heart attack, stroke and renal failure. Low sodium and fat diet, continue anti-hypertensive medications, and follow up with primary care physician. To maintain normal cholesterol levels to prevent stroke, coronary artery disease, peripheral vascular disease and heart attacks. Low fat diet, exercise daily and continue current medications. Follow up with primary care physician and cardiologist for management. Please follow up with Dr. Masters within 1 week and you will need discussion of a mitral valve clip.

## 2018-08-12 NOTE — DISCHARGE NOTE ADULT - PATIENT PORTAL LINK FT
You can access the [facility] Patient Portal, offered by [facility], by registering with the following website: [email or web address]

## 2018-08-12 NOTE — PROGRESS NOTE ADULT - SUBJECTIVE AND OBJECTIVE BOX
Cardiovascular Disease Progress Note    Overnight events: No acute events overnight.  awaiting tte. sob improved. no cp/sob  Otherwise review of systems negative    Objective Findings:  T(C): 36.4 (08-10-18 @ 06:13), Max: 36.7 (18 @ 12:49)  HR: 94 (08-10-18 @ 06:13) (70 - 98)  BP: 141/82 (08-10-18 @ 06:13) (112/76 - 148/78)  RR: 16 (08-10-18 @ 06:13) (16 - 18)  SpO2: 98% (08-10-18 @ 06:13) (95% - 100%)  Wt(kg): --  Daily     Daily Weight in k.4 (10 Aug 2018 05:50)      Physical Exam:  Gen: NAD  HEENT: EOMI  CV: RRR, normal S1 + S2, no m/r/g  Lungs: CTAB  Abd: soft, non-tender  Ext: No edema    Telemetry:    Laboratory Data:                        11.7   6.56  )-----------( 225      ( 10 Aug 2018 07:10 )             39.1     08-10    143  |  104  |  28<H>  ----------------------------<  101<H>  3.3<L>   |  25  |  1.03    Ca    8.8      10 Aug 2018 07:10  Mg     2.0     0810        Inpatient Medications:  MEDICATIONS  (STANDING):  apixaban 2.5 milliGRAM(s) Oral every 12 hours  atorvastatin 10 milliGRAM(s) Oral at bedtime  furosemide   Injectable 20 milliGRAM(s) IV Push daily  lisinopril 2.5 milliGRAM(s) Oral daily  metoprolol tartrate 50 milliGRAM(s) Oral two times a day  pantoprazole    Tablet 40 milliGRAM(s) Oral before breakfast      Assessment:  83 y/o F with PMH of Afib(on Eliquis), CAD(s/p 1 stent), systolic HF (with EF of 42%), HTN, HLD who presented with the complaint of transient right arm paralysis and numbness and MCMILLAN consistent with possible TIA and acute on chronic systolic heart failure exacerbation    1) Acute on chronic systolic HF, mod LV dysfunction, mod-severe MR  -appears euvolemic. would transition back to lasix 40mg po daily with careful monitoring of I/O, weights, BMP and electroloytes  -c/w metop xl 50mg bid for tx of systolic HF and also to allow for improved rate control as her CHF may possibly be exacerbated by her underling afib with RVR. her tachycardia doesnt appear compensatory or secondary to a low flow state  -would inc lisinopril to 5mg for improved afterload reduction  -would repeat TTE. notable recent drop in LVEF with worsening of MR. can be done as an outpatient      2) AF with RVR, now with improved rates  -c/w eliquis as dosed  -c/w with metop 50mg xl BID  -tele monitoring while adjusting rate control meds    3) possible TIA  -atypical features  -neuro consult appreciated. f/u neuro recs    4) microcytic anemia while on AC  -would check iron levels        Over 25 minutes spent on total encounter; more than 50% of the visit was spent counseling and/or coordinating care by the attending physician.      Fernando Masters MD   Cardiovascular Disease  [phone]
_________________________________________________________________________________________  ========>>  M E D I C A L   A T T E N D I N G    F O L L O W  U P  N O T E  <<=========  -----------------------------------------------------------------------------------------------------    - Patient seen and examined by me approximately thirty minutes ago.   - In summary,  GAVIOTA SINGH is a 84y year old woman who originally presented with SOB  - Patient today overall doing ok, comfortable, eating OK. overall improved     ==================>> REVIEW OF SYSTEM <<=================    GEN: no fever, no chills, no pain  RESP: no SOB, no cough, no sputum  CVS: no chest pain, no palpitations, no edema  GI: no abdominal pain, no nausea, no constipation, no diarrhea  : no dysuria, no frequency, no hematuria  Neuro: no headache, no dizziness  Derm : no itching, no rash    ==================>> PHYSICAL EXAM <<=================    GEN: A&O X 3 , NAD , comfortable  HEENT: NCAT, PERRL, MMM, hearing intact  Neck: supple , no JVD  CVS: S1S2 , regular , No M/R/G appreciated  PULM: CTA B/L,  no W/R/R appreciated  ABD.: soft. non tender, non distended,  bowel sounds present  Extrem: intact pulses , no edema   PSYCH : normal mood,  not anxious       ==================>> MEDICATIONS <<====================    apixaban 2.5 milliGRAM(s) Oral every 12 hours  atorvastatin 10 milliGRAM(s) Oral at bedtime  metoprolol tartrate 50 milliGRAM(s) Oral two times a day  pantoprazole    Tablet 40 milliGRAM(s) Oral before breakfast  [Lisinopril added]    ==================>> VITAL SIGNS <<==================    Vital Signs Last 24 Hrs  T(C): 36.4 (08-10-18 @ 06:13)  T(F): 97.6 (08-10-18 @ 06:13), Max: 98.1 (08-09-18 @ 16:48)  HR: 94 (08-10-18 @ 06:13) (70 - 98)  BP: 141/82 (08-10-18 @ 06:13)  BP(mean): 101 (08-09-18 @ 17:50) (101 - 101)  RR: 16 (08-10-18 @ 06:13) (16 - 18)  SpO2: 98% (08-10-18 @ 06:13) (95% - 100%)       ==================>> LAB AND IMAGING <<==================                        11.7   6.56  )-----------( 225      ( 10 Aug 2018 07:10 )             39.1        08-10    143  |  104  |  28<H>  ----------------------------<  101<H>  3.3<L>   |  25  |  1.03    Ca    8.8      10 Aug 2018 07:10  Mg     2.0     08-10    ___________________________________________________________________________________  ===============>>  A S S E S S M E N T   A N D   P L A N <<===============  ------------------------------------------------------------------------------------------    · Assessment		  83 y/o F with PMH of Afib(on Eliquis), CAD(s/p 1 stent), CHF(with EF of 42%), HTN, HLD who presented with the complaint of transient right arm paralysis and numbness and MCMILLAN. R/o TIA vs CVA vs CHF    +Acute on chronic systolic CHF-On Lasix     Problem/Plan - 1:  ·  Problem: TIA resolved  negative workup so far  monitor   neuro f.u as needed    Problem/Plan - 2:  ·  Problem: Acute on chronic systolic (congestive) heart failure.    telemetry  cardio appreciated  Lasix >> PO  Lisinopril as added   TTE ordered to evaluate LVEF, PENDING!!    Problem/Plan - 3:  ·  Problem: Chronic atrial fibrillation.    on Eliquis for anticoagulation   Continue with Metoprolol as changed..   cardio appreciated     Problem/Plan - 4:  ·  Problem: Essential hypertension.     Continue with antihypertensive medications   DASH diet recommended.     Problem/Plan - 5:  ·  Problem: HLD    Continue with Lipitor daily     -GI/DVT Prophylaxis.    - Dispo planing pending above  --------------------------------------------  Case discussed with pt  Education given on findings and plan of care  ___________________________  H. JOSE LUIS Connell.  Pager: [phone]
Cardiovascular Disease Progress Note    Overnight events: No acute events overnight.  diuresing well. sob improved. no neuro sx. no cp/pnd/orthopnea  Otherwise review of systems negative    Objective Findings:  T(C): 36.7 (18 @ 05:50), Max: 37.3 (18 @ 21:50)  HR: 84 (18 @ 05:50) (84 - 111)  BP: 127/80 (18 @ 05:50) (115/80 - 147/80)  RR: 18 (18 @ 05:50) (16 - 20)  SpO2: 98% (18 @ 05:50) (97% - 100%)  Wt(kg): --  Daily     Daily Weight in k.1 (09 Aug 2018 04:58)      Physical Exam:  Gen: NAD  HEENT: EOMI  CV: RRR, normal S1 + S2, 2/6 isi. JVP mildly elevated  Lungs: CTAB  Abd: soft, non-tender  Ext: No edema    Telemetry: rate controlled afib    Laboratory Data:                        10.3   6.37  )-----------( 200      ( 09 Aug 2018 06:15 )             34.6     08-    140  |  105  |  29<H>  ----------------------------<  101<H>  3.6   |  21<L>  |  1.05    Ca    8.4      09 Aug 2018 06:15  Phos  4.1     -  Mg     2.0         TPro  6.8  /  Alb  3.7  /  TBili  1.0  /  DBili  x   /  AST  29  /  ALT  17  /  AlkPhos  89  08-07              Inpatient Medications:  MEDICATIONS  (STANDING):  apixaban 2.5 milliGRAM(s) Oral every 12 hours  atorvastatin 10 milliGRAM(s) Oral at bedtime  furosemide   Injectable 20 milliGRAM(s) IV Push daily  lisinopril 2.5 milliGRAM(s) Oral daily  metoprolol tartrate 50 milliGRAM(s) Oral two times a day  pantoprazole    Tablet 40 milliGRAM(s) Oral before breakfast      Assessment:  1) Acute on chronic systolic HF, mod LV dysfunction, mod-severe MR  -c/w lasix 20mg IV bid. may need to increase to 40mg IV bid. need careful monitoring of I/O, weights, BMP and electroloytes  -c/w metop xl 50mg bid for tx of systolic HF and also to allow for improved rate control as her CHF may possibly be exacerbated by her underling afib with RVR. her tachycardia doesnt appear compensatory or secondary to a low flow state  -c/w lisinopril 2.5mg for now and uptitrate as tolerates  -would repeat TTE. notable recent drop in LVEF with worsening of MR    2) AF with RVR  -c/w eliquis as dosed  -change metop tartrate to 50mg xl BID  -tele monitoring while adjusting rate control meds    3) possible TIA  -atypical features  -neuro consult appreciated. f/u neuro recs    4) microcytic anemia while on AC  -would check iron levels      Over 25 minutes spent on total encounter; more than 50% of the visit was spent counseling and/or coordinating care by the attending physician.      Fernando Masters MD   Cardiovascular Disease  [phone]
[facility] Coverage    CC: feeling better, wants to go home    TELEMETRY: AF 80's    PHYSICAL EXAM:    T(C): 36.4 (08-11-18 @ 06:10), Max: 36.7 (08-10-18 @ 21:15)  HR: 86 (08-11-18 @ 06:10) (73 - 99)  BP: 150/95 (08-11-18 @ 06:10) (132/91 - 152/91)  RR: 18 (08-11-18 @ 06:10) (16 - 18)  SpO2: 99% (08-11-18 @ 06:10) (97% - 99%)  Wt(kg): --  I&O's Summary    10 Aug 2018 07:01  -  11 Aug 2018 07:00  --------------------------------------------------------  IN: 650 mL / OUT: 900 mL / NET: -250 mL        Appearance: Normal	  Cardiovascular: irreg S1 S2,RRR, No JVD, No murmurs  Respiratory: Lungs clear to auscultation	  Gastrointestinal:  Soft, Non-tender, + BS	  Extremities: Normal range of motion, No clubbing, cyanosis or edema  Vascular: Peripheral pulses palpable 2+ bilaterally     LABS:	 	                          10.6   7.22  )-----------( 210      ( 11 Aug 2018 06:00 )             36.0     08-11    143  |  107  |  27<H>  ----------------------------<  107<H>  4.0   |  21<L>  |  0.89    Ca    8.8      11 Aug 2018 06:00  Mg     1.9     08-11            CARDIAC MARKERS:        MEDICATIONS  (STANDING):  apixaban 2.5 milliGRAM(s) Oral every 12 hours  atorvastatin 10 milliGRAM(s) Oral at bedtime  furosemide    Tablet 40 milliGRAM(s) Oral daily  lisinopril 5 milliGRAM(s) Oral daily  metoprolol tartrate 50 milliGRAM(s) Oral two times a day  pantoprazole    Tablet 40 milliGRAM(s) Oral before breakfast
[facility] Coverage    CC: feels well, no complaints    TELEMETRY:     PHYSICAL EXAM:    T(C): 36.7 (08-12-18 @ 06:32), Max: 36.7 (08-12-18 @ 06:32)  HR: 92 (08-12-18 @ 06:32) (89 - 92)  BP: 141/106 (08-12-18 @ 06:32) (132/81 - 141/106)  RR: 18 (08-12-18 @ 06:32) (18 - 18)  SpO2: 100% (08-12-18 @ 06:32) (98% - 100%)  Wt(kg): --  I&O's Summary    11 Aug 2018 07:01  -  12 Aug 2018 07:00  --------------------------------------------------------  IN: 250 mL / OUT: 800 mL / NET: -550 mL        Appearance: Normal	  Cardiovascular: Normal S1 S2,RRR, No JVD, No murmurs  Respiratory: Lungs clear to auscultation	  Gastrointestinal:  Soft, Non-tender, + BS	  Extremities: Normal range of motion, No clubbing, cyanosis or edema  Vascular: Peripheral pulses palpable 2+ bilaterally     LABS:	 	                          11.7   7.32  )-----------( 214      ( 12 Aug 2018 06:15 )             38.4     08-12    140  |  102  |  26<H>  ----------------------------<  104<H>  3.7   |  22  |  0.87    Ca    9.0      12 Aug 2018 06:15  Mg     1.9     08-12            CARDIAC MARKERS:
_________________________________________________________________________________________  ========>>  M E D I C A L   A T T E N D I N G    F O L L O W  U P  N O T E  <<=========  -----------------------------------------------------------------------------------------------------    - Patient seen and examined by me approximately sixty minutes ago.   - In summary,  GAVIOTA SINGH is a 84y year old woman who originally presented with SOB  - Patient today overall doing ok, comfortable, eating OK. overall improved     ==================>> REVIEW OF SYSTEM <<=================    GEN: no fever, no chills, no pain  RESP: no SOB, no cough, no sputum  CVS: no chest pain, no palpitations, no edema  GI: no abdominal pain, no nausea, no constipation, no diarrhea  : no dysuria, no frequency, no hematuria  Neuro: no headache, no dizziness  Derm : no itching, no rash    ==================>> PHYSICAL EXAM <<=================    GEN: A&O X 3 , NAD , comfortable  HEENT: NCAT, PERRL, MMM, hearing intact  Neck: supple , no JVD  CVS: S1S2 , regular , No M/R/G appreciated  PULM: CTA B/L,  no W/R/R appreciated  ABD.: soft. non tender, non distended,  bowel sounds present  Extrem: intact pulses , no edema   PSYCH : normal mood,  not anxious        ==================>> MEDICATIONS <<====================    apixaban 2.5 milliGRAM(s) Oral every 12 hours  atorvastatin 10 milliGRAM(s) Oral at bedtime  furosemide    Tablet 40 milliGRAM(s) Oral daily  lisinopril 5 milliGRAM(s) Oral daily  metoprolol tartrate 50 milliGRAM(s) Oral two times a day  pantoprazole    Tablet 40 milliGRAM(s) Oral before breakfast    ==================>> VITAL SIGNS <<==================    Vital Signs Last 24 Hrs  T(C): 36.7 (08-12-18 @ 06:32)  T(F): 98.1 (08-12-18 @ 06:32), Max: 98.1 (08-12-18 @ 06:32)  HR: 92 (08-12-18 @ 06:32) (89 - 92)  BP: 141/106 (08-12-18 @ 06:32)  RR: 18 (08-12-18 @ 06:32) (18 - 18)  SpO2: 100% (08-12-18 @ 06:32) (98% - 100%)       ==================>> LAB AND IMAGING <<==================                        11.7   7.32  )-----------( 214      ( 12 Aug 2018 06:15 )             38.4        08-12    140  |  102  |  26<H>  ----------------------------<  104<H>  3.7   |  22  |  0.87    Ca    9.0      12 Aug 2018 06:15  Mg     1.9     08-12    < from: Transthoracic Echocardiogram (08.10.18 @ 14:00) >  CONCLUSIONS:  1. Mitral annular calcification, otherwise normal mitral valve. Severe mitral regurgitation.  2. Severely dilated left atrium.  LA volume index = 59 cc/m2.  3. Normal left ventricular internal dimensions and wall thicknesses.  4. Severe global left ventricular systolic dysfunction.  5. Normal right ventricular size and function.  6. A bubble study was performed with the intravenous  injection of agitated saline contrast.  Following contrast  injection, no obvious bubbles were seen in the left heart.  No obvious cardiac source of embolus was identified on this  transthoracic study.  If clinical suspicion is high,  consider KEI for further evaluation.  < end of copied text >    ___________________________________________________________________________________  ===============>>  A S S E S S M E N T   A N D   P L A N <<===============  ------------------------------------------------------------------------------------------    · Assessment		  83 y/o F with PMH of Afib(on Eliquis), CAD(s/p 1 stent), CHF(with EF of 42%), HTN, HLD who presented with the complaint of transient right arm paralysis and numbness and MCMILLAN. R/o TIA vs CVA vs CHF    +Acute on chronic systolic CHF-On Lasix     Problem/Plan - 1:  ·  Problem: Severe MR  discussed with pt and cardio, regarding possible mitral Clip  may need further evaluation by structural heart team if agreeable ( in vs outpatient)   possible DC plan home today if t can get home ( vs tomorrow AM)    Problem/Plan - 2:  ·  Problem: Acute on chronic systolic (congestive) heart failure.    telemetry  cardio appreciated  Lasix >> PO  Lisinopril as added   TTE repeat as above  per discussion with cardio: to address MR before considering life vest, ICD, ...       Problem/Plan - 3:  ·  Problem: Chronic atrial fibrillation.    on Eliquis for anticoagulation   Continue with Metoprolol as changed..   cardio appreciated     Problem/Plan - 4:  ·  Problem: Essential hypertension.     Continue with antihypertensive medications   DASH diet recommended.     Problem/Plan - 5:  ·  Problem: HLD    Continue with Lipitor daily     -GI/DVT Prophylaxis.    - Dispo planing as above  --------------------------------------------  Case discussed with pt  Education given on findings and plan of care  ___________________________  H. JOSE LUIS Connell.  Pager: [phone]
_________________________________________________________________________________________  ========>>  M E D I C A L   A T T E N D I N G    F O L L O W  U P  N O T E  <<=========  -----------------------------------------------------------------------------------------------------    - Patient seen and examined by me approximately thirty minutes ago.   - In summary,  GAVIOTA SINGH is a 84y year old woman who originally presented with SOB  - Patient today overall doing ok, comfortable, eating OK. overall improved     ==================>> REVIEW OF SYSTEM <<=================    GEN: no fever, no chills, no pain  RESP: no SOB, no cough, no sputum  CVS: no chest pain, no palpitations, no edema  GI: no abdominal pain, no nausea, no constipation, no diarrhea  : no dysuria, no frequency, no hematuria  Neuro: no headache, no dizziness  Derm : no itching, no rash    ==================>> PHYSICAL EXAM <<=================    GEN: A&O X 3 , NAD , comfortable  HEENT: NCAT, PERRL, MMM, hearing intact  Neck: supple , no JVD  CVS: S1S2 , regular , No M/R/G appreciated  PULM: CTA B/L,  no W/R/R appreciated  ABD.: soft. non tender, non distended,  bowel sounds present  Extrem: intact pulses , no edema   PSYCH : normal mood,  not anxious       ==================>> MEDICATIONS <<====================    apixaban 2.5 milliGRAM(s) Oral every 12 hours  atorvastatin 10 milliGRAM(s) Oral at bedtime  furosemide    Tablet 40 milliGRAM(s) Oral daily  lisinopril 5 milliGRAM(s) Oral daily  metoprolol tartrate 50 milliGRAM(s) Oral two times a day  pantoprazole    Tablet 40 milliGRAM(s) Oral before breakfast    ==================>> VITAL SIGNS <<==================    Vital Signs Last 24 Hrs  T(C): 36.4 (08-11-18 @ 06:10)  T(F): 97.6 (08-11-18 @ 06:10), Max: 98.1 (08-10-18 @ 21:15)  HR: 86 (08-11-18 @ 06:10) (73 - 99)  BP: 150/95 (08-11-18 @ 06:10)  BP(mean): --  RR: 18 (08-11-18 @ 06:10) (16 - 18)  SpO2: 99% (08-11-18 @ 06:10) (97% - 99%)       ==================>> LAB AND IMAGING <<==================                        10.6   7.22  )-----------( 210      ( 11 Aug 2018 06:00 )             36.0        143  |  107  |  27<H>  ----------------------------<  107<H>  4.0   |  21<L>  |  0.89    Ca    8.8      11 Aug 2018 06:00  Mg     1.9     08-11    < from: Transthoracic Echocardiogram (08.10.18 @ 14:00) >  CONCLUSIONS:  1. Mitral annular calcification, otherwise normal mitral valve. Severe mitral regurgitation.  2. Severely dilated left atrium.  LA volume index = 59 cc/m2.  3. Normal left ventricular internal dimensions and wall thicknesses.  4. Severe global left ventricular systolic dysfunction.  5. Normal right ventricular size and function.  6. A bubble study was performed with the intravenous  injection of agitated saline contrast.  Following contrast  injection, no obvious bubbles were seen in the left heart.  No obvious cardiac source of embolus was identified on this  transthoracic study.  If clinical suspicion is high,  consider KEI for further evaluation.  < end of copied text >    ___________________________________________________________________________________  ===============>>  A S S E S S M E N T   A N D   P L A N <<===============  ------------------------------------------------------------------------------------------    · Assessment		  83 y/o F with PMH of Afib(on Eliquis), CAD(s/p 1 stent), CHF(with EF of 42%), HTN, HLD who presented with the complaint of transient right arm paralysis and numbness and MCMILLAN. R/o TIA vs CVA vs CHF    +Acute on chronic systolic CHF-On Lasix     Problem/Plan - 1:  ·  Problem: Severe MR  discussed with pt and cardio, regarding possible mitral Clip  pt likely agreeable but will consider ( defiantly does not want "surgery" )  may need further evaluation by structural heart team if agreeable     Problem/Plan - 2:  ·  Problem: Acute on chronic systolic (congestive) heart failure.    telemetry  cardio appreciated  Lasix >> PO  Lisinopril as added   TTE repeat as above  cardio to consider life vest, ICD, ... if pt agreeable      Problem/Plan - 3:  ·  Problem: Chronic atrial fibrillation.    on Eliquis for anticoagulation   Continue with Metoprolol as changed..   cardio appreciated     Problem/Plan - 4:  ·  Problem: Essential hypertension.     Continue with antihypertensive medications   DASH diet recommended.     Problem/Plan - 5:  ·  Problem: HLD    Continue with Lipitor daily     -GI/DVT Prophylaxis.    - Dispo planing pending above  --------------------------------------------  Case discussed with pt, primary cardio  Education given on findings and plan of care  ___________________________  H. JOSE LUIS Connell.  Pager: [phone]
_________________________________________________________________________________________  ========>>  M E D I C A L   A T T E N D I N G    F O L L O W  U P  N O T E  <<=========  -----------------------------------------------------------------------------------------------------    - Patient seen and examined by me approximately thirty minutes ago.  - In summary,  GAVIOTA SINGH is a 84y year old woman who originally presented with   - Patient today overall doing ok, comfortable, eating OK.     ==================>> REVIEW OF SYSTEM <<=================    GEN: no fever, no chills, no pain  RESP: no SOB, no cough, no sputum  CVS: no chest pain, no palpitations, no edema  GI: no abdominal pain, no nausea, no constipation, no diarrhea  : no dysuria, no frequency, no hematuria  Neuro: no headache, no dizziness  Derm : no itching, no rash    ==================>> PHYSICAL EXAM <<=================    GEN: A&O X 3 , NAD , comfortable  HEENT: NCAT, PERRL, MMM, hearing intact  Neck: supple , no JVD  CVS: S1S2 , regular , No M/R/G appreciated  PULM: CTA B/L,  no W/R/R appreciated  ABD.: soft. non tender, non distended,  bowel sounds present  Extrem: intact pulses , no edema   PSYCH : normal mood,  not anxious      ==================>> MEDICATIONS <<====================    MEDICATIONS  (STANDING):  apixaban 2.5 milliGRAM(s) Oral every 12 hours  atorvastatin 10 milliGRAM(s) Oral at bedtime  furosemide   Injectable 20 milliGRAM(s) IV Push daily  lisinopril 2.5 milliGRAM(s) Oral daily  metoprolol succinate ER 50 milliGRAM(s) Oral two times a day  pantoprazole    Tablet 40 milliGRAM(s) Oral before breakfast    ==================>> VITAL SIGNS <<==================    T(C): 36.4 (08-08-18 @ 17:16), Max: 36.9 (08-08-18 @ 09:40)  HR: 98 (08-08-18 @ 17:16) (90 - 103)  BP: 119/87 (08-08-18 @ 17:16) (115/80 - 155/78)  RR: 18 (08-08-18 @ 17:16) (16 - 20)  SpO2: 100% (08-08-18 @ 17:16) (97% - 100%)      I&O's Summary    07 Aug 2018 07:01  -  08 Aug 2018 07:00  --------------------------------------------------------  IN: 350 mL / OUT: 0 mL / NET: 350 mL     ==================>> LAB AND IMAGING <<==================                        13.0   11.70 )-----------( 270      ( 08 Aug 2018 07:00 )             44.6        08-08    140  |  99  |  22  ----------------------------<  150<H>  4.1   |  18<L>  |  1.05    Ca    9.3      08 Aug 2018 07:00  Phos  4.1     08-08  Mg     2.0     08-08    TPro  6.8  /  Alb  3.7  /  TBili  1.0  /  DBili  x   /  AST  29  /  ALT  17  /  AlkPhos  89  08-07    TSH:      2.81   (08-08-18)           Lipid profile:  (08-08-18)     Total: 198     LDL  : 129     HDL  :50     TG   :175     HgA1C: 6.0  (08-08-18)            ___________________________________________________________________________________  ===============>>  A S S E S S M E N T   A N D   P L A N <<===============  ------------------------------------------------------------------------------------------    · Assessment      85 y/o F with PMH of Afib(on Eliquis), CAD(s/p 1 stent), CHF(with EF of 42%), HTN, HLD who presented with the complaint of transient right arm paralysis and numbness and MCMILLAN. R/o TIA vs CVA vs CHF    +Acute on chronic systolic CHF-On IV Lasix 20mg QD     Problem/Plan - 1:  ·  Problem: TIA resolved  negative workup so far  monitor   neuro f.u as needed    Problem/Plan - 2:  ·  Problem: Acute on chronic systolic (congestive) heart failure.    telemetry  cardio appreciated  Lasix   TTE ordered to evaluate LVEF    Problem/Plan - 3:  ·  Problem: Chronic atrial fibrillation.    on Eliquis for anticoagulation   Continue with Metoprolol as changed..   cardio appreciated     Problem/Plan - 4:  ·  Problem: Essential hypertension.     Continue with antihypertensive medications   DASH diet recommended.     Problem/Plan - 5:  ·  Problem: HLD    Continue with Lipitor daily     -GI/DVT Prophylaxis.    --------------------------------------------  Case discussed with pt  Education given on findings and plan of care  ___________________________  H. JOSE LUIS Connell.  Pager: [phone]
_________________________________________________________________________________________  ========>>  M E D I C A L   A T T E N D I N G    F O L L O W  U P  N O T E  <<=========  -----------------------------------------------------------------------------------------------------    - Patient seen and examined by me earlier today.   - In summary,  GAVIOTA SINGH is a 84y year old woman who originally presented with   - Patient today overall doing ok, comfortable, eating OK.     ==================>> REVIEW OF SYSTEM <<=================    GEN: no fever, no chills, no pain  RESP: no SOB, no cough, no sputum  CVS: no chest pain, no palpitations, no edema  GI: no abdominal pain, no nausea, no constipation, no diarrhea  : no dysuria, no frequency, no hematuria  Neuro: no headache, no dizziness  Derm : no itching, no rash    ==================>> PHYSICAL EXAM <<=================    GEN: A&O X 3 , NAD , comfortable  HEENT: NCAT, PERRL, MMM, hearing intact  Neck: supple , no JVD  CVS: S1S2 , regular , No M/R/G appreciated  PULM: CTA B/L,  no W/R/R appreciated  ABD.: soft. non tender, non distended,  bowel sounds present  Extrem: intact pulses , no edema   PSYCH : normal mood,  not anxious      ==================>> MEDICATIONS <<====================    apixaban 2.5 milliGRAM(s) Oral every 12 hours  atorvastatin 10 milliGRAM(s) Oral at bedtime  furosemide   Injectable 20 milliGRAM(s) IV Push daily  lisinopril 2.5 milliGRAM(s) Oral daily  metoprolol tartrate 50 milliGRAM(s) Oral two times a day  pantoprazole    Tablet 40 milliGRAM(s) Oral before breakfast    ==================>> VITAL SIGNS <<==================    Vital Signs Last 24 Hrs  T(C): 36.7 (08-09-18 @ 16:48)  T(F): 98.1 (08-09-18 @ 16:48), Max: 99.1 (08-08-18 @ 21:50)  HR: 90 (08-09-18 @ 17:50) (72 - 111)  BP: 143/88 (08-09-18 @ 17:50)  BP(mean): 101 (08-09-18 @ 17:50) (101 - 101)  RR: 18 (08-09-18 @ 17:50) (18 - 20)  SpO2: 98% (08-09-18 @ 17:50) (95% - 100%)       ==================>> LAB AND IMAGING <<==================                        10.3   6.37  )-----------( 200      ( 09 Aug 2018 06:15 )             34.6        08-09    140  |  105  |  29<H>  ----------------------------<  101<H>  3.6   |  21<L>  |  1.05    Ca    8.4      09 Aug 2018 06:15  Phos  4.1     08-08  Mg     2.0     08-09    PENDING ECHO!!  ___________________________________________________________________________________  ===============>>  A S S E S S M E N T   A N D   P L A N <<===============  ------------------------------------------------------------------------------------------    · Assessment		  83 y/o F with PMH of Afib(on Eliquis), CAD(s/p 1 stent), CHF(with EF of 42%), HTN, HLD who presented with the complaint of transient right arm paralysis and numbness and MCMILLAN. R/o TIA vs CVA vs CHF    +Acute on chronic systolic CHF-On IV Lasix     Problem/Plan - 1:  ·  Problem: TIA resolved  negative workup so far  monitor   neuro f.u as needed    Problem/Plan - 2:  ·  Problem: Acute on chronic systolic (congestive) heart failure.    telemetry  cardio appreciated  Lasix   TTE ordered to evaluate LVEF, PENDING!!    Problem/Plan - 3:  ·  Problem: Chronic atrial fibrillation.    on Eliquis for anticoagulation   Continue with Metoprolol as changed..   cardio appreciated     Problem/Plan - 4:  ·  Problem: Essential hypertension.     Continue with antihypertensive medications   DASH diet recommended.     Problem/Plan - 5:  ·  Problem: HLD    Continue with Lipitor daily     -GI/DVT Prophylaxis.    --------------------------------------------  Case discussed with pt  Education given on findings and plan of care  ___________________________  H. JOSE LUIS Connell.  Pager: [phone]

## 2018-08-12 NOTE — DISCHARGE NOTE ADULT - SECONDARY DIAGNOSIS.
Chronic atrial fibrillation Essential hypertension HLD (hyperlipidemia) Mitral valve insufficiency, unspecified etiology

## 2018-08-12 NOTE — PROGRESS NOTE ADULT - ASSESSMENT
1) Acute on chronic systolic HF, mod LV dysfunction, mod-severe MR  -c/w lasix 40mg PO daily  -c/w metop xl 50mg bid for tx of systolic HF and also to allow for improved rate control as her CHF may possibly be exacerbated by her underling afib with RVR. her tachycardia doesnt appear compensatory or secondary to a low flow state  -c/w lisinopril 5mg for now   -repeat TTE reveals notable recent drop in LVEF with worsening of MR, consider outpt eval for mitraclip?    2) AF with RVR  -c/w eliquis as dosed  -cont metop tartrate to 50mg xl BID  -tele monitoring while adjusting rate control meds    3) possible TIA  -atypical features  -neuro consult appreciated. f/u neuro recs    4) microcytic anemia while on AC  -would check iron levels    DC Home today to followup w Dr Masters

## 2018-08-12 NOTE — DISCHARGE NOTE ADULT - HOSPITAL COURSE
83 y/o F with PMH of Afib(on Eliquis), CAD(s/p 1 stent), CHF(with EF of 42%), HTN, HLD who presented with the complaint of transient right arm paralysis and numbness and MCMILLAN. R/o TIA vs CVA vs CHF.  The following was the hospital course:    +Acute on chronic systolic CHF - on PO Lasix, s/p IV Lasix 20mg QD   + sev MR on echo  The patient was evaluated by cardiology and it was decided the patient will be discharged to home with follow up to discuss mitral valve clip with Dr. Masters.  The there can be discussion of life vest vs ICD.    The following were test results:    EKG: Atrial fibrillation with RVR at a rate of 105 with TWI in leads I, AVl and V6 and QTc of 454  CE x 1: Trop: 19->18  Gluc: 111  H&H: 11.0/37.5  CT head: No acute hemorrhage. 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 No other significant interval changes from the prior exam.  CXR: Mild interstitial edema without localized pulmonary disease  HgA1C: 6.0 (preDM)   8/10 EF 30%  1. Mitral annular calcification, otherwise normal mitral valve. Severe mitral regurgitation.  2. Severely dilated left atrium.  LA volume index = 59 cc/m2.  3. Normal left ventricular internal dimensions and wall thicknesses.  4. Severe global left ventricular systolic dysfunction.  5. Normal right ventricular size and function.  6. A bubble study was performed with the intravenous injection of agitated saline contrast. Following contrast injection, no obvious bubbles were seen in the left heart.  No obvious cardiac source of embolus was identified on this transthoracic study. If clinical suspicion is high, consider KEI for further evaluation.

## 2018-08-12 NOTE — DISCHARGE NOTE ADULT - HOME CARE AGENCY
[facility]  [phone] .   Nurse to call and visit day after discharge 8/13/2018

## 2018-11-27 ENCOUNTER — INPATIENT (INPATIENT)
Facility: HOSPITAL | Age: 83
LOS: 2 days | Discharge: ANOTHER HOSPITAL | End: 2018-11-30
Attending: INTERNAL MEDICINE | Admitting: INTERNAL MEDICINE
Payer: MEDICARE

## 2018-11-27 VITALS
TEMPERATURE: 98 F | OXYGEN SATURATION: 97 % | HEART RATE: 69 BPM | SYSTOLIC BLOOD PRESSURE: 128 MMHG | WEIGHT: 119.93 LBS | DIASTOLIC BLOOD PRESSURE: 78 MMHG | RESPIRATION RATE: 18 BRPM

## 2018-11-27 DIAGNOSIS — I10 ESSENTIAL (PRIMARY) HYPERTENSION: ICD-10-CM

## 2018-11-27 DIAGNOSIS — I48.2 CHRONIC ATRIAL FIBRILLATION: ICD-10-CM

## 2018-11-27 DIAGNOSIS — R42 DIZZINESS AND GIDDINESS: ICD-10-CM

## 2018-11-27 DIAGNOSIS — N30.00 ACUTE CYSTITIS WITHOUT HEMATURIA: ICD-10-CM

## 2018-11-27 LAB
ALBUMIN SERPL ELPH-MCNC: 2.7 G/DL — LOW (ref 3.3–5)
ALP SERPL-CCNC: 96 U/L — SIGNIFICANT CHANGE UP (ref 40–120)
ALT FLD-CCNC: 22 U/L — SIGNIFICANT CHANGE UP (ref 12–78)
ANION GAP SERPL CALC-SCNC: 7 MMOL/L — SIGNIFICANT CHANGE UP (ref 5–17)
APPEARANCE UR: ABNORMAL
APTT BLD: 35.5 SEC — SIGNIFICANT CHANGE UP (ref 27.5–36.3)
AST SERPL-CCNC: 32 U/L — SIGNIFICANT CHANGE UP (ref 15–37)
BACTERIA # UR AUTO: ABNORMAL
BILIRUB SERPL-MCNC: 0.6 MG/DL — SIGNIFICANT CHANGE UP (ref 0.2–1.2)
BILIRUB UR-MCNC: NEGATIVE — SIGNIFICANT CHANGE UP
BUN SERPL-MCNC: 17 MG/DL — SIGNIFICANT CHANGE UP (ref 7–23)
CALCIUM SERPL-MCNC: 8.1 MG/DL — LOW (ref 8.5–10.1)
CHLORIDE SERPL-SCNC: 107 MMOL/L — SIGNIFICANT CHANGE UP (ref 96–108)
CK MB BLD-MCNC: 3.3 % — SIGNIFICANT CHANGE UP (ref 0–3.5)
CK MB BLD-MCNC: 4.4 % — HIGH (ref 0–3.5)
CK MB CFR SERPL CALC: 1 NG/ML — SIGNIFICANT CHANGE UP (ref 0.5–3.6)
CK MB CFR SERPL CALC: 1.4 NG/ML — SIGNIFICANT CHANGE UP (ref 0.5–3.6)
CK SERPL-CCNC: 30 U/L — SIGNIFICANT CHANGE UP (ref 26–192)
CK SERPL-CCNC: 32 U/L — SIGNIFICANT CHANGE UP (ref 26–192)
CO2 SERPL-SCNC: 29 MMOL/L — SIGNIFICANT CHANGE UP (ref 22–31)
COLOR SPEC: YELLOW — SIGNIFICANT CHANGE UP
CREAT SERPL-MCNC: 1.1 MG/DL — SIGNIFICANT CHANGE UP (ref 0.5–1.3)
DIFF PNL FLD: ABNORMAL
EPI CELLS # UR: SIGNIFICANT CHANGE UP
GLUCOSE SERPL-MCNC: 88 MG/DL — SIGNIFICANT CHANGE UP (ref 70–99)
GLUCOSE UR QL: NEGATIVE MG/DL — SIGNIFICANT CHANGE UP
HCT VFR BLD CALC: 35.1 % — SIGNIFICANT CHANGE UP (ref 34.5–45)
HGB BLD-MCNC: 10.5 G/DL — LOW (ref 11.5–15.5)
INR BLD: 1.47 RATIO — HIGH (ref 0.88–1.16)
KETONES UR-MCNC: NEGATIVE — SIGNIFICANT CHANGE UP
LEUKOCYTE ESTERASE UR-ACNC: ABNORMAL
MCHC RBC-ENTMCNC: 24.7 PG — LOW (ref 27–34)
MCHC RBC-ENTMCNC: 29.9 GM/DL — LOW (ref 32–36)
MCV RBC AUTO: 82.6 FL — SIGNIFICANT CHANGE UP (ref 80–100)
NITRITE UR-MCNC: NEGATIVE — SIGNIFICANT CHANGE UP
NRBC # BLD: 0 /100 WBCS — SIGNIFICANT CHANGE UP (ref 0–0)
PH UR: 6.5 — SIGNIFICANT CHANGE UP (ref 5–8)
PLATELET # BLD AUTO: 184 K/UL — SIGNIFICANT CHANGE UP (ref 150–400)
POTASSIUM SERPL-MCNC: 3.5 MMOL/L — SIGNIFICANT CHANGE UP (ref 3.5–5.3)
POTASSIUM SERPL-SCNC: 3.5 MMOL/L — SIGNIFICANT CHANGE UP (ref 3.5–5.3)
PROT SERPL-MCNC: 6.6 GM/DL — SIGNIFICANT CHANGE UP (ref 6–8.3)
PROT UR-MCNC: 15 MG/DL
PROTHROM AB SERPL-ACNC: 16.7 SEC — HIGH (ref 10–12.9)
RBC # BLD: 4.25 M/UL — SIGNIFICANT CHANGE UP (ref 3.8–5.2)
RBC # FLD: 20.6 % — HIGH (ref 10.3–14.5)
RBC CASTS # UR COMP ASSIST: ABNORMAL /HPF (ref 0–4)
SODIUM SERPL-SCNC: 143 MMOL/L — SIGNIFICANT CHANGE UP (ref 135–145)
SP GR SPEC: 1.01 — SIGNIFICANT CHANGE UP (ref 1.01–1.02)
TROPONIN I SERPL-MCNC: <.015 NG/ML — SIGNIFICANT CHANGE UP (ref 0.01–0.04)
TROPONIN I SERPL-MCNC: <.015 NG/ML — SIGNIFICANT CHANGE UP (ref 0.01–0.04)
UROBILINOGEN FLD QL: NEGATIVE MG/DL — SIGNIFICANT CHANGE UP
WBC # BLD: 6.64 K/UL — SIGNIFICANT CHANGE UP (ref 3.8–10.5)
WBC # FLD AUTO: 6.64 K/UL — SIGNIFICANT CHANGE UP (ref 3.8–10.5)
WBC UR QL: >50

## 2018-11-27 PROCEDURE — 99285 EMERGENCY DEPT VISIT HI MDM: CPT

## 2018-11-27 PROCEDURE — 99223 1ST HOSP IP/OBS HIGH 75: CPT | Mod: AI

## 2018-11-27 PROCEDURE — 93010 ELECTROCARDIOGRAM REPORT: CPT

## 2018-11-27 PROCEDURE — 70450 CT HEAD/BRAIN W/O DYE: CPT | Mod: 26

## 2018-11-27 PROCEDURE — 71045 X-RAY EXAM CHEST 1 VIEW: CPT | Mod: 26

## 2018-11-27 RX ORDER — APIXABAN 2.5 MG/1
5 TABLET, FILM COATED ORAL EVERY 12 HOURS
Qty: 0 | Refills: 0 | Status: DISCONTINUED | OUTPATIENT
Start: 2018-11-27 | End: 2018-11-28

## 2018-11-27 RX ORDER — MECLIZINE HCL 12.5 MG
25 TABLET ORAL ONCE
Qty: 0 | Refills: 0 | Status: COMPLETED | OUTPATIENT
Start: 2018-11-27 | End: 2018-11-27

## 2018-11-27 RX ORDER — LISINOPRIL 2.5 MG/1
5 TABLET ORAL DAILY
Qty: 0 | Refills: 0 | Status: DISCONTINUED | OUTPATIENT
Start: 2018-11-27 | End: 2018-11-30

## 2018-11-27 RX ORDER — METOPROLOL TARTRATE 50 MG
25 TABLET ORAL
Qty: 0 | Refills: 0 | Status: DISCONTINUED | OUTPATIENT
Start: 2018-11-27 | End: 2018-11-30

## 2018-11-27 RX ORDER — MECLIZINE HCL 12.5 MG
25 TABLET ORAL EVERY 8 HOURS
Qty: 0 | Refills: 0 | Status: DISCONTINUED | OUTPATIENT
Start: 2018-11-27 | End: 2018-11-28

## 2018-11-27 RX ORDER — CEFTRIAXONE 500 MG/1
1 INJECTION, POWDER, FOR SOLUTION INTRAMUSCULAR; INTRAVENOUS ONCE
Qty: 0 | Refills: 0 | Status: COMPLETED | OUTPATIENT
Start: 2018-11-27 | End: 2018-11-27

## 2018-11-27 RX ORDER — CEFTRIAXONE 500 MG/1
1 INJECTION, POWDER, FOR SOLUTION INTRAMUSCULAR; INTRAVENOUS EVERY 24 HOURS
Qty: 0 | Refills: 0 | Status: DISCONTINUED | OUTPATIENT
Start: 2018-11-28 | End: 2018-11-30

## 2018-11-27 RX ORDER — CEFTRIAXONE 500 MG/1
INJECTION, POWDER, FOR SOLUTION INTRAMUSCULAR; INTRAVENOUS
Qty: 0 | Refills: 0 | Status: DISCONTINUED | OUTPATIENT
Start: 2018-11-27 | End: 2018-11-30

## 2018-11-27 RX ADMIN — Medication 25 MILLIGRAM(S): at 22:17

## 2018-11-27 RX ADMIN — Medication 25 MILLIGRAM(S): at 13:19

## 2018-11-27 RX ADMIN — CEFTRIAXONE 100 GRAM(S): 500 INJECTION, POWDER, FOR SOLUTION INTRAMUSCULAR; INTRAVENOUS at 22:45

## 2018-11-27 NOTE — ED ADULT NURSE REASSESSMENT NOTE - NS ED NURSE REASSESS COMMENT FT1
Patient c/o not feeling better, feels like when she came in first, weak with dizziness and reports painful urination. DR. Crews informed and urine sample obtained and sent as per order.

## 2018-11-27 NOTE — H&P ADULT - NSHPLABSRESULTS_GEN_ALL_CORE
10.5   6.64  )-----------( 184      ( 27 Nov 2018 11:41 )             35.1   11-27    143  |  107  |  17  ----------------------------<  88  3.5   |  29  |  1.10    Ca    8.1<L>      27 Nov 2018 11:41    TPro  6.6  /  Alb  2.7<L>  /  TBili  0.6  /  DBili  x   /  AST  32  /  ALT  22  /  AlkPhos  96  11-27  < from: CT Head No Cont (11.27.18 @ 14:13) >    Impression:  1. no acute findings.          < < from: Xray Chest 1 View AP/PA. (11.27.18 @ 13:31) >    MPRESSION:     Question minimal patchy opacity in left retrocardiac region which may be   related to atelectasis versus infection.    ekg- atrial fibrillation

## 2018-11-27 NOTE — ED ADULT NURSE NOTE - OBJECTIVE STATEMENT
Pt. is received ib bed 7. Pt states she felt dizzy and felt like shes was going to fall, this morning. Pt. states "anytime she moves her head she feels dizzy"

## 2018-11-27 NOTE — ED PROVIDER NOTE - OBJECTIVE STATEMENT
84 year old female presents today c/o becoming sudden onset of dizziness as she was getting dressed this morning, pt describes feeling unsteady (-) chest pain (-) sob (-) nausea (-) vomiting (-) abdominal pain (-) headache (-) speech or visual changes

## 2018-11-27 NOTE — ED PROVIDER NOTE - PROGRESS NOTE DETAILS
pt initially stated that she felt better when re evaluated, now states that she feels that same as when she came in, c/o weakness in her legs

## 2018-11-27 NOTE — H&P ADULT - NSHPPHYSICALEXAM_GEN_ALL_CORE
ICU Vital Signs Last 24 Hrs  T(C): 36.4 (27 Nov 2018 16:20), Max: 36.7 (27 Nov 2018 12:02)  T(F): 97.5 (27 Nov 2018 16:20), Max: 98 (27 Nov 2018 12:02)  HR: 95 (27 Nov 2018 16:20) (69 - 95)  BP: 129/95 (27 Nov 2018 16:20) (112/69 - 129/95)  BP(mean): --  ABP: --  ABP(mean): --  RR: 18 (27 Nov 2018 16:20) (18 - 20)  SpO2: 97% (27 Nov 2018 16:20) (97% - 98%)  GENERAL: NAD well-developed  HEAD:  Atraumatic, Normocephalic  EYES: EOMI, PERRLA, conjunctiva and sclera clear  ENMT: No tonsillar erythema, exudates, or enlargement; Moist mucous membranes, Good dentition, No lesions  NECK: Supple, No JVD, Normal thyroid  NERVOUS SYSTEM:  Alert & Oriented X3, Good concentration; Motor Strength 5/5 B/L upper and lower extremities; DTRs 2+ intact and symmetric  CHEST/LUNG: Clear to percussion bilaterally; No rales, rhonchi, wheezing, or rubs  HEART: Regular rate and rhythm; No murmurs, rubs, or gallops  ABDOMEN: Soft, Nontender, Nondistended; Bowel sounds present  EXTREMITIES:  2+ Peripheral Pulses, No clubbing, cyanosis, or edema  LYMPH: No lymphadenopathy   SKIN: No rashes or lesions

## 2018-11-27 NOTE — H&P ADULT - NSHPREVIEWOFSYSTEMS_GEN_ALL_CORE
CONSTITUTIONAL: No fever, weight loss, or fatigue  EYES: No eye pain, visual disturbances, or discharge  ENMT:  No difficulty hearing, tinnitus, vertigo; No sinus or throat pain  NECK: No pain or stiffness  RESPIRATORY: No cough, wheezing, chills or hemoptysis; No shortness of breath  CARDIOVASCULAR: No chest pain, palpitations, dizziness, or leg swelling  GASTROINTESTINAL: No abdominal or epigastric pain. No nausea, vomiting, or hematemesis; No diarrhea or constipation. No melena or hematochezia.  GENITOURINARY: No dysuria, frequency, hematuria, or incontinence  NEUROLOGICAL: No headaches, memory loss, loss of strength, numbness, or tremors  SKIN: No itching, burning, rashes, or lesions   LYMPH NODES: No enlarged glands  ENDOCRINE: No heat or cold intolerance; No hair loss  MUSCULOSKELETAL: No joint pain or swelling; No muscle, back, or extremity pain  PSYCHIATRIC: No depression, anxiety, mood swings, or difficulty sleeping  HEME/LYMPH: No easy bruising, or bleeding gums  ALLERGY AND IMMUNOLOGIC: No hives or eczema CONSTITUTIONAL: No fever, weight loss, or fatigue  EYES: No eye pain, visual disturbances, or discharge  ENMT:  No difficulty hearing, tinnitus, vertigo; No sinus or throat pain  NECK: No pain or stiffness  RESPIRATORY: No cough, wheezing, chills or hemoptysis; No shortness of breath POSITIVE dyspnea upon exertion   CARDIOVASCULAR: No chest pain, palpitations, dizziness, or leg swelling  GASTROINTESTINAL: No abdominal or epigastric pain. No nausea, vomiting, or hematemesis; No diarrhea or constipation. No melena or hematochezia.  GENITOURINARYpos dysuria, frequency, hematuria, or incontinence  NEUROLOGICAL: No headaches, memory loss, loss of strength, numbness, or tremors  SKIN: No itching, burning, rashes, or lesions   LYMPH NODES: No enlarged glands  ENDOCRINE: No heat or cold intolerance; No hair loss  MUSCULOSKELETAL: No joint pain or swelling; No muscle, back, or extremity pain  PSYCHIATRIC: POSITIVE mild  depression ,no  anxiety, mood swings, or difficulty sleeping  HEME/LYMPH: No easy bruising, or bleeding gums  ALLERGY AND IMMUNOLOGIC: No hives or eczema CONSTITUTIONAL: No fever, weight loss, or fatigue  EYES: No eye pain, visual disturbances, or discharge  ENMT:  No difficulty hearing, tinnitus, vertigo; No sinus or throat pain  NECK: No pain or stiffness  RESPIRATORY: No cough, wheezing, chills or hemoptysis; No shortness of breath POSITIVE dyspnea upon exertion   CARDIOVASCULAR: No chest pain, palpitations, dizziness, or leg swelling  GASTROINTESTINAL: No abdominal or epigastric pain. No nausea, vomiting, or hematemesis; No diarrhea or constipation. No melena or hematochezia.  GENITOURINARY pos dysuria, frequency, hematuria, or incontinence  NEUROLOGICAL: No headaches, memory loss, loss of strength, numbness, or tremors  SKIN: No itching, burning, rashes, or lesions   LYMPH NODES: No enlarged glands  ENDOCRINE: No heat or cold intolerance; No hair loss  MUSCULOSKELETAL: No joint pain or swelling; No muscle, back, or extremity pain  PSYCHIATRIC: POSITIVE mild  depression ,no  anxiety, mood swings, or difficulty sleeping  HEME/LYMPH: No easy bruising, or bleeding gums  ALLERGY AND IMMUNOLOGIC: No hives or eczema

## 2018-11-27 NOTE — CONSULT NOTE ADULT - ASSESSMENT
Subjective Complaints:      Consult requested by ER doctor:                  Attending:     History of Present Illness:  Chief Complaint/Reason for Admission:  History of Present Illness:  HPI:  84 year old female presents today c/o becoming sudden onset of dizziness as she was getting dressed this morning, pt describes feeling unsteady (-) chest pain (-) sob (-) nausea (-) vomiting (-) abdominal pain (-) headache (-) speech or visual change - the dizziness feels like the room moving and not like passing out - denies chest pain short of breath - never had similar problems - the dizziness occurs when she moves her head  - denies earache or fever (2018 18:20)        PAST MEDICAL & SURGICAL HISTORY:  Atrial fibrillation  Hypertension, unspecified type  No significant past surgical history  84yFemale    MEDICATIONS  (STANDING):  apixaban 5 milliGRAM(s) Oral every 12 hours  cefTRIAXone   IVPB 1 Gram(s) IV Intermittent once  cefTRIAXone   IVPB      lisinopril 5 milliGRAM(s) Oral daily  meclizine 25 milliGRAM(s) Oral every 8 hours  metoprolol tartrate 25 milliGRAM(s) Oral two times a day    MEDICATIONS  (PRN):      Allergies    No Known Allergies    Intolerances      FAMILY HISTORY:      REVIEW OF SYSTEMS:  General:  No wt loss, fevers, chills, night sweats  Eyes:  Good vision, no reported pain  ENT:  No sore throat, pain, runny nose, dysphagia  CV:  No pain, palpitatioins, hypo/hypertension  Resp:  No dyspnea, cough, tachypnea, wheezing  GI:  No pain, nausea, vomiting, diarrhea, constipatiion  :  No pain, bleeding, incontinence, nocturia  Muscle:  No pain, weakness  Breast:  No pain, abscess, mass, discharge  Neuro:  No weakness, tingling, memory problems  Psych:  No fatigue, insomnia, mood problems, depression  Endocrine:  No polyuria, polydypsia, cold/heat intolerance  Heme:  No petechiae, ecchymosis, easy bruisability  Skin:  No rash, tattoos, scars, edema      Vital Signs Last 24 Hrs  T(C): 36.1 (2018 19:17), Max: 36.7 (2018 12:02)  T(F): 97 (2018 19:17), Max: 98.1 (2018 18:56)  HR: 100 (2018 21:10) (69 - 100)  BP: 126/81 (2018 21:10) (112/69 - 133/72)  BP(mean): --  RR: 18 (2018 21:10) (18 - 20)  SpO2: 98% (2018 21:10) (96% - 100%)    GENERAL PHYSICAL EXAM:  General:  Appears stated age, well-groomed, well-nourished, no distress  HEENT:  NC/AT, patent nares w/ pink mucosa, OP clear w/o lesions, PERRL, EOMI, conjunctivae clear, no thyromegaly, nodules, adenopathy, no JVD  Chest:  Full & symmetric excursion, no increased effort, breath sounds clear  Cardiovascular:  Regular rhythm, S1, S2, no murmur/rub/S3/S4, no carotid/femoral/abdominal bruit, radial/pedal pulses 2+, no edema  Abdomen:  Soft, non-tender, non-distended, normoactive bowel sounds, no HSM  Extremities:  Gait & station:   Digits:   Nails:   Joints, Bones, Muscles:   ROM:   Stability:  Skin:  No rash/erythema/ecchymoses/petechiae/wounds/abscess/warm/dry  Musculoskeletal:  Full ROM in all joints w/o swelling/tenderness/effusion    NEUROLOGICAL EXAM:  HENT:  Normocephalic head; atraumatic head.  Neck supple.  ENT: normal looking.  Mental State:    Alert.  Fully oriented to person, place and date.  Coherent.  Speech clear and intact.  Cooperative.  Responds appropriately.    Cranial Nerves:  II-XII:   Pupils round and reactive to light and accommodation.  Extraocular movements full.  Visual fields full (no homonymous hemianopsia).  Visual acuity wnl.  Facial symmetry intact.  Tongue midline.  Motor Functions:  Moves all extremities.  No pronator drift of UE.  Claps hand well.  Hand  intact bilaterally.  Ambulatory.    Sensory Functions:   Intact to touch and pinprick to face and extremities.    Reflexes:  Deep tendon reflexes normoactive to biceps, knees and ankles.  Babinski absent (present).  Cerebellar Testing:    Finger to nose intact.  Nystagmus absent.  Neurovascular: Carotid auscultation full without bruits.      LABS:                        10.5   6.64  )-----------( 184      ( 2018 11:41 )             35.1     11-27    143  |  107  |  17  ----------------------------<  88  3.5   |  29  |  1.10    Ca    8.1<L>      2018 11:41    TPro  6.6  /  Alb  2.7<L>  /  TBili  0.6  /  DBili  x   /  AST  32  /  ALT  22  /  AlkPhos  96      PT/INR - ( 2018 11:41 )   PT: 16.7 sec;   INR: 1.47 ratio         PTT - ( 2018 11:41 )  PTT:35.5 sec    Urinalysis Basic - ( 2018 17:44 )    Color: Yellow / Appearance: Slightly Turbid / S.010 / pH: x  Gluc: x / Ketone: Negative  / Bili: Negative / Urobili: Negative mg/dL   Blood: x / Protein: 15 mg/dL / Nitrite: Negative   Leuk Esterase: Moderate / RBC: 3-5 /HPF / WBC >50   Sq Epi: x / Non Sq Epi: Few / Bacteria: Many        RADIOLOGY & ADDITIONAL STUDIES:      Assessment & Opinion: events  noted dizziness htn unsteady gait   no headache  hx of sfib on eliquis  arm leg 4/5 sensory intact  for work up no diplopia no nystagmus  will follow     Recommendations:  Brain MRI.  Carotid doppler.  Echocardiogram.  EEG.   DVT prophylaxis as ordered.  Medications:

## 2018-11-27 NOTE — H&P ADULT - HISTORY OF PRESENT ILLNESS
84 year old female presents today c/o becoming sudden onset of dizziness as she was getting dressed this morning, pt describes feeling unsteady (-) chest pain (-) sob (-) nausea (-) vomiting (-) abdominal pain (-) headache (-) speech or visual change 84 year old female presents today c/o becoming sudden onset of dizziness as she was getting dressed this morning, pt describes feeling unsteady (-) chest pain (-) sob (-) nausea (-) vomiting (-) abdominal pain (-) headache (-) speech or visual change - the dizziness feels like the room moving and not like passing out - denies chest pain short of breath - never had similar problems - the dizziness occurs when she moves her head  - denies earache or fever

## 2018-11-27 NOTE — H&P ADULT - ASSESSMENT
84f with hx of atrial fibrillation with dizziness requiring at least two days of hospitalizattion     IMPROVE VTE Individual Risk Assessment          RISK                                                          Points  [  ] Previous VTE                                                3  [  ] Thrombophilia                                             2  [  ] Lower limb paralysis                                   2        (unable to hold up >15 seconds)    [  ] Current Cancer                                             2         (within 6 months)  [ x ] Immobilization > 24 hrs                              1  [  ] ICU/CCU stay > 24 hours                             1  [ x ] Age > 60                                                         1    IMPROVE VTE Score:         [  2       ]    Total Risk Factor Score:    0 - 1:   Consider IPC  >2 - 3:  Thromboprophylaxis required (enoxaparin or SQ heparin)        >4:   High Risk: Thromboprophylaxis required (enoxaparin or SQ heparin), optional add IPC  **If CONTRAINDICATION to enoxaparin or SQ heparin, USE IPCs**

## 2018-11-27 NOTE — ED ADULT NURSE NOTE - NSIMPLEMENTINTERV_GEN_ALL_ED
Implemented All Fall with Harm Risk Interventions:  [place] to call system. Call bell, personal items and telephone within reach. Instruct patient to call for assistance. Room bathroom lighting operational. Non-slip footwear when patient is off stretcher. Physically safe environment: no spills, clutter or unnecessary equipment. Stretcher in lowest position, wheels locked, appropriate side rails in place. Provide visual cue, wrist band, yellow gown, etc. Monitor gait and stability. Monitor for mental status changes and reorient to person, place, and time. Review medications for side effects contributing to fall risk. Reinforce activity limits and safety measures with patient and family. Provide visual clues: red socks.

## 2018-11-28 PROBLEM — I50.9 HEART FAILURE, UNSPECIFIED: Chronic | Status: ACTIVE | Noted: 2018-08-07

## 2018-11-28 LAB
ANION GAP SERPL CALC-SCNC: 11 MMOL/L — SIGNIFICANT CHANGE UP (ref 5–17)
BUN SERPL-MCNC: 20 MG/DL — SIGNIFICANT CHANGE UP (ref 7–23)
CALCIUM SERPL-MCNC: 7.7 MG/DL — LOW (ref 8.5–10.1)
CHLORIDE SERPL-SCNC: 107 MMOL/L — SIGNIFICANT CHANGE UP (ref 96–108)
CO2 SERPL-SCNC: 25 MMOL/L — SIGNIFICANT CHANGE UP (ref 22–31)
CREAT SERPL-MCNC: 1.08 MG/DL — SIGNIFICANT CHANGE UP (ref 0.5–1.3)
CULTURE RESULTS: SIGNIFICANT CHANGE UP
GLUCOSE SERPL-MCNC: 77 MG/DL — SIGNIFICANT CHANGE UP (ref 70–99)
HCT VFR BLD CALC: 33.6 % — LOW (ref 34.5–45)
HGB BLD-MCNC: 10.2 G/DL — LOW (ref 11.5–15.5)
MCHC RBC-ENTMCNC: 24.7 PG — LOW (ref 27–34)
MCHC RBC-ENTMCNC: 30.4 GM/DL — LOW (ref 32–36)
MCV RBC AUTO: 81.4 FL — SIGNIFICANT CHANGE UP (ref 80–100)
NRBC # BLD: 0 /100 WBCS — SIGNIFICANT CHANGE UP (ref 0–0)
PLATELET # BLD AUTO: 163 K/UL — SIGNIFICANT CHANGE UP (ref 150–400)
POTASSIUM SERPL-MCNC: 3.6 MMOL/L — SIGNIFICANT CHANGE UP (ref 3.5–5.3)
POTASSIUM SERPL-SCNC: 3.6 MMOL/L — SIGNIFICANT CHANGE UP (ref 3.5–5.3)
RBC # BLD: 4.13 M/UL — SIGNIFICANT CHANGE UP (ref 3.8–5.2)
RBC # FLD: 20.4 % — HIGH (ref 10.3–14.5)
SODIUM SERPL-SCNC: 143 MMOL/L — SIGNIFICANT CHANGE UP (ref 135–145)
SPECIMEN SOURCE: SIGNIFICANT CHANGE UP
TSH SERPL-MCNC: 3.4 UIU/ML — SIGNIFICANT CHANGE UP (ref 0.36–3.74)
VIT B12 SERPL-MCNC: 809 PG/ML — SIGNIFICANT CHANGE UP (ref 232–1245)
WBC # BLD: 5.33 K/UL — SIGNIFICANT CHANGE UP (ref 3.8–10.5)
WBC # FLD AUTO: 5.33 K/UL — SIGNIFICANT CHANGE UP (ref 3.8–10.5)

## 2018-11-28 PROCEDURE — 93306 TTE W/DOPPLER COMPLETE: CPT | Mod: 26

## 2018-11-28 PROCEDURE — 99233 SBSQ HOSP IP/OBS HIGH 50: CPT

## 2018-11-28 PROCEDURE — 70551 MRI BRAIN STEM W/O DYE: CPT | Mod: 26

## 2018-11-28 PROCEDURE — 93880 EXTRACRANIAL BILAT STUDY: CPT | Mod: 26

## 2018-11-28 RX ORDER — ENOXAPARIN SODIUM 100 MG/ML
50 INJECTION SUBCUTANEOUS DAILY
Qty: 0 | Refills: 0 | Status: DISCONTINUED | OUTPATIENT
Start: 2018-11-29 | End: 2018-11-30

## 2018-11-28 RX ORDER — MECLIZINE HCL 12.5 MG
25 TABLET ORAL EVERY 8 HOURS
Qty: 0 | Refills: 0 | Status: DISCONTINUED | OUTPATIENT
Start: 2018-11-28 | End: 2018-11-30

## 2018-11-28 RX ADMIN — APIXABAN 5 MILLIGRAM(S): 2.5 TABLET, FILM COATED ORAL at 16:58

## 2018-11-28 RX ADMIN — Medication 25 MILLIGRAM(S): at 16:56

## 2018-11-28 RX ADMIN — LISINOPRIL 5 MILLIGRAM(S): 2.5 TABLET ORAL at 07:11

## 2018-11-28 RX ADMIN — Medication 25 MILLIGRAM(S): at 07:10

## 2018-11-28 RX ADMIN — APIXABAN 5 MILLIGRAM(S): 2.5 TABLET, FILM COATED ORAL at 07:12

## 2018-11-28 RX ADMIN — Medication 25 MILLIGRAM(S): at 14:55

## 2018-11-28 RX ADMIN — CEFTRIAXONE 100 GRAM(S): 500 INJECTION, POWDER, FOR SOLUTION INTRAMUSCULAR; INTRAVENOUS at 16:56

## 2018-11-28 NOTE — CONSULT NOTE ADULT - PROVIDER SPECIALTY LIST ADULT
Cardiology Quality 131: Pain Assessment And Follow-Up: Pain assessment using a standardized tool is documented as negative, no follow-up plan required Quality 226: Preventive Care And Screening: Tobacco Use: Screening And Cessation Intervention: Patient screened for tobacco and never smoked Quality 111:Pneumonia Vaccination Status For Older Adults: Pneumococcal Vaccination Previously Received Quality 47: Advance Care Plan: Advance Care Planning discussed and documented in the medical record; patient did not wish or was not able to name a surrogate decision maker or provide an advance care plan. Detail Level: Detailed Quality 110: Preventive Care And Screening: Influenza Immunization: Influenza Immunization previously received during influenza season Quality 130: Documentation Of Current Medications In The Medical Record: Current Medications Documented

## 2018-11-28 NOTE — PROGRESS NOTE ADULT - SUBJECTIVE AND OBJECTIVE BOX
83 yo F w/ history of HTN, CVA & atrial fibrillation p/w with dizziness & lightheadedness & was found to have a UTI. She is lying in bed in [facility], reports her dizziness has improved.    MEDICATIONS  (STANDING):  apixaban 5 milliGRAM(s) Oral every 12 hours  cefTRIAXone   IVPB 1 Gram(s) IV Intermittent every 24 hours  cefTRIAXone   IVPB      lisinopril 5 milliGRAM(s) Oral daily  meclizine 25 milliGRAM(s) Oral every 8 hours  metoprolol tartrate 25 milliGRAM(s) Oral two times a day    MEDICATIONS  (PRN):      Allergies    No Known Allergies    Intolerances        Vital Signs Last 24 Hrs  T(C): 36.1 (2018 16:22), Max: 37 (2018 05:23)  T(F): 97 (2018 16:22), Max: 98.6 (2018 05:23)  HR: 84 (2018 16:22) (67 - 115)  BP: 128/56 (2018 16:22) (125/66 - 156/66)  BP(mean): --  RR: 18 (2018 16:22) (18 - 18)  SpO2: 97% (2018 16:22) (96% - 100%)    PHYSICAL EXAM:  GENERAL: NAD, well-groomed, well-developed  HEAD:  Atraumatic, Normocephalic  EYES: EOMI, PERRLA   NECK: Supple   NERVOUS SYSTEM:  Alert    CHEST/LUNG: Clear to auscultation bilaterally; No rales, rhonchi, wheezing, or rubs  HEART: irregularly irregular rhythm   ABDOMEN: Soft, Nontender, Nondistended; Bowel sounds present  EXTREMITIES: No clubbing, cyanosis, or edema    LABS:                        10.2   5.33  )-----------( 163      ( 2018 06:19 )             33.6     11-28    143  |  107  |  20  ----------------------------<  77  3.6   |  25  |  1.08    Ca    7.7<L>      2018 06:19    TPro  6.6  /  Alb  2.7<L>  /  TBili  0.6  /  DBili  x   /  AST  32  /  ALT  22  /  AlkPhos  96  11-27    PT/INR - ( 2018 11:41 )   PT: 16.7 sec;   INR: 1.47 ratio         PTT - ( 2018 11:41 )  PTT:35.5 sec  Urinalysis Basic - ( 2018 17:44 )    Color: Yellow / Appearance: Slightly Turbid / S.010 / pH: x  Gluc: x / Ketone: Negative  / Bili: Negative / Urobili: Negative mg/dL   Blood: x / Protein: 15 mg/dL / Nitrite: Negative   Leuk Esterase: Moderate / RBC: 3-5 /HPF / WBC >50   Sq Epi: x / Non Sq Epi: Few / Bacteria: Many      CAPILLARY BLOOD GLUCOSE          RADIOLOGY & ADDITIONAL TESTS:    18 @ 07:01  -  18 @ 07:00  --------------------------------------------------------  IN:    Oral Fluid: 240 mL  Total IN: 240 mL    OUT:  Total OUT: 0 mL    Total NET: 240 mL      18 @ 07:01  -  18 @ 18:52  --------------------------------------------------------  IN:    Oral Fluid: 120 mL  Total IN: 120 mL    OUT:    Voided: 2 mL  Total OUT: 2 mL    Total NET: 118 mL

## 2018-11-28 NOTE — CONSULT NOTE ADULT - SUBJECTIVE AND OBJECTIVE BOX
HPI:  HPI:  84 year old female presents today c/o becoming sudden onset of dizziness as she was getting dressed this morning, pt describes feeling unsteady (-) chest pain (-) sob (-) nausea (-) vomiting (-) abdominal pain (-) headache (-) speech or visual change - the dizziness feels like the room moving and not like passing out - denies chest pain short of breath - never had similar problems - the dizziness occurs when she moves her head  - denies earache or fever (2018 18:20)      Chief Complaint:  Patient is a 84y old  Female who presents with a chief complaint of dizziness (2018 18:52)      Review of Systems:      Eyes:  Good vision, no reported pain  ENT:  No sore throat, pain, runny nose, dysphagia  CV:  No pain, palpitations, hypo/hypertension  Resp:  No dyspnea, cough, tachypnea, wheezing  GI:  No pain, nausea, vomiting, diarrhea, constipation             Social History/Family History  SOCHX:   tobacco,  -  alcohol    FMHX: FA/MO  - contributory       Discussed with:  PMD, Family    Physical Exam:    Vital Signs:  Vital Signs Last 24 Hrs  T(C): 36 (2018 05:02), Max: 36.1 (2018 16:22)  T(F): 96.8 (2018 05:02), Max: 97 (2018 16:22)  HR: 104 (2018 05:02) (67 - 104)  BP: 149/89 (2018 05:02) (128/56 - 156/66)  BP(mean): --  RR: 18 (2018 05:02) (18 - 18)  SpO2: 98% (2018 05:02) (97% - 100%)  Daily     Daily Weight in k.2 (2018 05:02)  I&O's Summary    2018 07:01  -  2018 07:00  --------------------------------------------------------  IN: 360 mL / OUT: 2 mL / NET: 358 mL        Chest:  decreased at bases  Cardiovascular:  Regular rhythm, S1, S2, no murmur/rub/S3/S4, no carotid/femoral/abdominal bruit, radial/pedal pulses 2+, no edema  Abdomen:  Soft, non-tender, non-distended, normoactive bowel sounds, no HSM      Laboratory:                          10.6   6.00  )-----------( 166      ( 2018 06:14 )             35.1     -    143  |  108  |  21  ----------------------------<  86  3.8   |  26  |  0.96    Ca    8.1<L>      2018 06:13  Phos  3.4       Mg     2.1         TPro  6.6  /  Alb  2.7<L>  /  TBili  0.6  /  DBili  x   /  AST  32  /  ALT  22  /  AlkPhos  96        CARDIAC MARKERS ( 2018 16:31 )  <.015 ng/mL / x     / 30 U/L / x     / 1.0 ng/mL  CARDIAC MARKERS ( 2018 11:41 )  <.015 ng/mL / x     / 32 U/L / x     / 1.4 ng/mL      CAPILLARY BLOOD GLUCOSE        LIVER FUNCTIONS - ( 2018 11:41 )  Alb: 2.7 g/dL / Pro: 6.6 gm/dL / ALK PHOS: 96 U/L / ALT: 22 U/L / AST: 32 U/L / GGT: x           PT/INR - ( 2018 11:41 )   PT: 16.7 sec;   INR: 1.47 ratio         PTT - ( 2018 11:41 )  PTT:35.5 sec  Urinalysis Basic - ( 2018 17:44 )    Color: Yellow / Appearance: Slightly Turbid / S.010 / pH: x  Gluc: x / Ketone: Negative  / Bili: Negative / Urobili: Negative mg/dL   Blood: x / Protein: 15 mg/dL / Nitrite: Negative   Leuk Esterase: Moderate / RBC: 3-5 /HPF / WBC >50   Sq Epi: x / Non Sq Epi: Few / Bacteria: Many        Assessment:  Full assessment in am  UTI, ABX coverage   Noted severe MR  Review of previous TTE noted moderate to severe MR,   Treat medically, to discuss options as TMVR is now an available option

## 2018-11-29 LAB
ANION GAP SERPL CALC-SCNC: 9 MMOL/L — SIGNIFICANT CHANGE UP (ref 5–17)
BUN SERPL-MCNC: 21 MG/DL — SIGNIFICANT CHANGE UP (ref 7–23)
CALCIUM SERPL-MCNC: 8.1 MG/DL — LOW (ref 8.5–10.1)
CHLORIDE SERPL-SCNC: 108 MMOL/L — SIGNIFICANT CHANGE UP (ref 96–108)
CO2 SERPL-SCNC: 26 MMOL/L — SIGNIFICANT CHANGE UP (ref 22–31)
CREAT SERPL-MCNC: 0.96 MG/DL — SIGNIFICANT CHANGE UP (ref 0.5–1.3)
GLUCOSE SERPL-MCNC: 86 MG/DL — SIGNIFICANT CHANGE UP (ref 70–99)
HCT VFR BLD CALC: 35.1 % — SIGNIFICANT CHANGE UP (ref 34.5–45)
HGB BLD-MCNC: 10.6 G/DL — LOW (ref 11.5–15.5)
MAGNESIUM SERPL-MCNC: 2.1 MG/DL — SIGNIFICANT CHANGE UP (ref 1.6–2.6)
MCHC RBC-ENTMCNC: 24.5 PG — LOW (ref 27–34)
MCHC RBC-ENTMCNC: 30.2 GM/DL — LOW (ref 32–36)
MCV RBC AUTO: 81.3 FL — SIGNIFICANT CHANGE UP (ref 80–100)
NRBC # BLD: 0 /100 WBCS — SIGNIFICANT CHANGE UP (ref 0–0)
PHOSPHATE SERPL-MCNC: 3.4 MG/DL — SIGNIFICANT CHANGE UP (ref 2.5–4.5)
PLATELET # BLD AUTO: 166 K/UL — SIGNIFICANT CHANGE UP (ref 150–400)
POTASSIUM SERPL-MCNC: 3.8 MMOL/L — SIGNIFICANT CHANGE UP (ref 3.5–5.3)
POTASSIUM SERPL-SCNC: 3.8 MMOL/L — SIGNIFICANT CHANGE UP (ref 3.5–5.3)
RBC # BLD: 4.32 M/UL — SIGNIFICANT CHANGE UP (ref 3.8–5.2)
RBC # FLD: 20.7 % — HIGH (ref 10.3–14.5)
SODIUM SERPL-SCNC: 143 MMOL/L — SIGNIFICANT CHANGE UP (ref 135–145)
WBC # BLD: 6 K/UL — SIGNIFICANT CHANGE UP (ref 3.8–10.5)
WBC # FLD AUTO: 6 K/UL — SIGNIFICANT CHANGE UP (ref 3.8–10.5)

## 2018-11-29 PROCEDURE — 99233 SBSQ HOSP IP/OBS HIGH 50: CPT

## 2018-11-29 RX ADMIN — CEFTRIAXONE 100 GRAM(S): 500 INJECTION, POWDER, FOR SOLUTION INTRAMUSCULAR; INTRAVENOUS at 17:43

## 2018-11-29 RX ADMIN — LISINOPRIL 5 MILLIGRAM(S): 2.5 TABLET ORAL at 06:16

## 2018-11-29 RX ADMIN — ENOXAPARIN SODIUM 50 MILLIGRAM(S): 100 INJECTION SUBCUTANEOUS at 11:27

## 2018-11-29 RX ADMIN — Medication 25 MILLIGRAM(S): at 06:16

## 2018-11-29 NOTE — CHART NOTE - NSCHARTNOTEFT_GEN_A_CORE
Upon Nutritional Assessment by the Registered Dietitian your patient was determined to meet criteria / has evidence of the following diagnosis/diagnoses:          [ ]  Mild Protein Calorie Malnutrition        [ ]  Moderate Protein Calorie Malnutrition        [X ] Severe Protein Calorie Malnutrition        [ ] Unspecified Protein Calorie Malnutrition        [ ] Underweight / BMI <19        [ ] Morbid Obesity / BMI > 40      Findings as based on:  •  Comprehensive nutrition assessment and consultation  •  Calorie counts (nutrient intake analysis)  •  Food acceptance and intake status from observations by staff  •  Follow up  •  Patient education  •  Intervention secondary to interdisciplinary rounds  •   concerns      Treatment:    The following diet has been recommended:  Liberalize to Low Na diet; add Ensure Enlive x 2/day (provides 700 kcal, 40 g protein) for additional nutrition support      PROVIDER Section:     By signing this assessment you are acknowledging and agree with the diagnosis/diagnoses assigned by the Registered Dietitian    Comments:

## 2018-11-29 NOTE — DIETITIAN INITIAL EVALUATION ADULT. - PERTINENT LABORATORY DATA
11-29 Na143 mmol/L Glu 86 mg/dL K+ 3.8 mmol/L Cr  0.96 mg/dL BUN 21 mg/dL 11-29 Phos 3.4 mg/dL 11-27 Alb 2.7 g/dL<L>

## 2018-11-29 NOTE — PHYSICAL THERAPY INITIAL EVALUATION ADULT - GENERAL OBSERVATIONS, REHAB EVAL
Pt found alert, not in distress,  on room air, pt stated " I am feeling weak and getting tired easily".

## 2018-11-29 NOTE — PROGRESS NOTE ADULT - SUBJECTIVE AND OBJECTIVE BOX
84 year old female presents today c/o becoming sudden onset of dizziness as she was getting dressed this morning, pt describes feeling unsteady (-) chest pain (-) sob (-) nausea (-) vomiting (-) abdominal pain (-) headache (-) speech or visual change - the dizziness feels like the room moving and not like passing out - denies chest pain short of breath - never had similar problems - the dizziness occurs when she moves her head  - denies earache or fever (2018 18:20)      Chief Complaint:  Patient is a 84y old  Female who presents with a chief complaint of dizziness (2018 18:52)      Review of Systems:      Eyes:  Good vision, no reported pain  ENT:  No sore throat, pain, runny nose, dysphagia  CV:  No pain, palpitations, hypo/hypertension  Resp:  No dyspnea, cough, tachypnea, wheezing  GI:  No pain, nausea, vomiting, diarrhea, constipation             Social History/Family History  SOCHX:   tobacco,  -  alcohol    FMHX: FA/MO  - contributory       Discussed with:  PMD, Family    Physical Exam:    Vital Signs:  Vital Signs Last 24 Hrs  T(C): 36 (2018 05:02), Max: 36.1 (2018 16:22)  T(F): 96.8 (2018 05:02), Max: 97 (2018 16:22)  HR: 104 (2018 05:02) (67 - 104)  BP: 149/89 (2018 05:02) (128/56 - 156/66)  BP(mean): --  RR: 18 (2018 05:02) (18 - 18)  SpO2: 98% (2018 05:02) (97% - 100%)  Daily     Daily Weight in k.2 (2018 05:02)  I&O's Summary    2018 07:01  -  2018 07:00  --------------------------------------------------------  IN: 360 mL / OUT: 2 mL / NET: 358 mL        Chest:  decreased at bases  Cardiovascular:  Regular rhythm, S1, S2, no murmur/rub/S3/S4, no carotid/femoral/abdominal bruit, radial/pedal pulses 2+, no edema  Abdomen:  Soft, non-tender, non-distended, normoactive bowel sounds, no HSM      Laboratory:                          10.6   6.00  )-----------( 166      ( 2018 06:14 )             35.1         143  |  108  |  21  ----------------------------<  86  3.8   |  26  |  0.96    Ca    8.1<L>      2018 06:13  Phos  3.4       Mg     2.1         TPro  6.6  /  Alb  2.7<L>  /  TBili  0.6  /  DBili  x   /  AST  32  /  ALT  22  /  AlkPhos  96        CARDIAC MARKERS ( 2018 16:31 )  <.015 ng/mL / x     / 30 U/L / x     / 1.0 ng/mL  CARDIAC MARKERS ( 2018 11:41 )  <.015 ng/mL / x     / 32 U/L / x     / 1.4 ng/mL      CAPILLARY BLOOD GLUCOSE        LIVER FUNCTIONS - ( 2018 11:41 )  Alb: 2.7 g/dL / Pro: 6.6 gm/dL / ALK PHOS: 96 U/L / ALT: 22 U/L / AST: 32 U/L / GGT: x           PT/INR - ( 2018 11:41 )   PT: 16.7 sec;   INR: 1.47 ratio         PTT - ( 2018 11:41 )  PTT:35.5 sec  Urinalysis Basic - ( 2018 17:44 )    Color: Yellow / Appearance: Slightly Turbid / S.010 / pH: x  Gluc: x / Ketone: Negative  / Bili: Negative / Urobili: Negative mg/dL   Blood: x / Protein: 15 mg/dL / Nitrite: Negative   Leuk Esterase: Moderate / RBC: 3-5 /HPF / WBC >50   Sq Epi: x / Non Sq Epi: Few / Bacteria: Many        Assessment:  Full assessment in am  UTI, ABX coverage   Noted severe MR  Review of previous TTE noted moderate to severe MR,   Treat medically continues  I discussed with patient in detail, and spoke with son who allowed his wife Ingris to d/w me.   The patient appears of sound mind, although family is stating she may be confused at times  The family wishes a d/w their Cardiologist, Dr. Recinos, i provided my service and office number to have this Cardiologist reach out to me to discuss  I have not received a call, after d/w patient she states she prefers a [facility] and therefore [facility] was offered for possible TMVR consideration   I will d/w Neuro to be sure there is no new NEuro event first  Eliquis is to be restarted at 2.5 BID if plans to DC to home are planned

## 2018-11-29 NOTE — DIETITIAN INITIAL EVALUATION ADULT. - ENERGY NEEDS
Height (cm): 157.48 (11-27)  Weight (kg): 49.2 (11-29)  BMI (kg/m2): 19.8 (11-29)  IBW:  50 kg +/- 10%     % IBW:   98%       UBW:  54.5 kg        %UBW: 90%

## 2018-11-29 NOTE — DIETITIAN INITIAL EVALUATION ADULT. - PHYSICAL APPEARANCE
BMI = 19.8; no edema noted; Nutrition focused physical exam conducted:  Subcutaneous fat loss: [severe ] Orbital fat pads region, [mild ]Buccal fat region, [severe ]Triceps region,  [moderate ]Ribs region.  Muscle wasting: [severe ]Temples region, [severe ]Clavicle region, [moderate ]Shoulder region, [moderate ]Scapula region, [severe ]Interosseous region,  [moderate ]thigh region, [moderate ]Calf region/underweight

## 2018-11-29 NOTE — PROGRESS NOTE ADULT - SUBJECTIVE AND OBJECTIVE BOX
85 yo F w/ history of HTN, CVA & atrial fibrillation p/w with dizziness & lightheadedness & was found to have a UTI. She is lying in bed in [facility], reports her dizziness has improved.    MEDICATIONS  (STANDING):  cefTRIAXone   IVPB 1 Gram(s) IV Intermittent every 24 hours  cefTRIAXone   IVPB      enoxaparin Injectable 50 milliGRAM(s) SubCutaneous daily  lisinopril 5 milliGRAM(s) Oral daily  metoprolol tartrate 25 milliGRAM(s) Oral two times a day    MEDICATIONS  (PRN):  meclizine 25 milliGRAM(s) Oral every 8 hours PRN Dizziness      Allergies    No Known Allergies    Intolerances        Vital Signs Last 24 Hrs  T(C): 36.1 (29 Nov 2018 16:41), Max: 36.4 (29 Nov 2018 13:07)  T(F): 96.9 (29 Nov 2018 16:41), Max: 97.6 (29 Nov 2018 13:07)  HR: 64 (29 Nov 2018 16:41) (64 - 118)  BP: 97/58 (29 Nov 2018 16:41) (97/58 - 149/89)  BP(mean): 72 (29 Nov 2018 16:41) (72 - 72)  RR: 17 (29 Nov 2018 16:41) (17 - 18)  SpO2: 100% (29 Nov 2018 16:41) (97% - 100%)    PHYSICAL EXAM:  GENERAL: NAD, well-groomed, well-developed  HEAD:  Atraumatic, Normocephalic  EYES: EOMI, PERRLA   NECK: Supple   NERVOUS SYSTEM:  Alert    CHEST/LUNG: Clear to auscultation bilaterally; No rales, rhonchi, wheezing, or rubs  HEART: irregularly irregular rhythm   ABDOMEN: Soft, Nontender, Nondistended; Bowel sounds present  EXTREMITIES: No clubbing, cyanosis, or edema      LABS:                        10.6   6.00  )-----------( 166      ( 29 Nov 2018 06:14 )             35.1     11-29    143  |  108  |  21  ----------------------------<  86  3.8   |  26  |  0.96    Ca    8.1<L>      29 Nov 2018 06:13  Phos  3.4     11-29  Mg     2.1     11-29          CAPILLARY BLOOD GLUCOSE           Culture - Urine (collected 27 Nov 2018 23:00)  Source: .Urine Clean Catch (Midstream)  Final Report (28 Nov 2018 21:52):    Culture grew 3 or more types of organisms which indicate    collection contamination; consider recollection only if clinically    indicated.      RADIOLOGY & ADDITIONAL TESTS:    11-28-18 @ 07:01  -  11-29-18 @ 07:00  --------------------------------------------------------  IN:    Oral Fluid: 360 mL  Total IN: 360 mL    OUT:    Voided: 2 mL  Total OUT: 2 mL    Total NET: 358 mL

## 2018-11-30 VITALS
HEART RATE: 102 BPM | DIASTOLIC BLOOD PRESSURE: 76 MMHG | RESPIRATION RATE: 18 BRPM | TEMPERATURE: 99 F | OXYGEN SATURATION: 98 % | SYSTOLIC BLOOD PRESSURE: 104 MMHG

## 2018-11-30 PROCEDURE — 99239 HOSP IP/OBS DSCHRG MGMT >30: CPT

## 2018-11-30 RX ORDER — ASPIRIN/CALCIUM CARB/MAGNESIUM 324 MG
81 TABLET ORAL DAILY
Qty: 0 | Refills: 0 | Status: DISCONTINUED | OUTPATIENT
Start: 2018-11-30 | End: 2018-11-30

## 2018-11-30 RX ADMIN — Medication 25 MILLIGRAM(S): at 05:48

## 2018-11-30 RX ADMIN — LISINOPRIL 5 MILLIGRAM(S): 2.5 TABLET ORAL at 05:49

## 2018-11-30 RX ADMIN — Medication 81 MILLIGRAM(S): at 19:15

## 2018-11-30 RX ADMIN — ENOXAPARIN SODIUM 50 MILLIGRAM(S): 100 INJECTION SUBCUTANEOUS at 12:24

## 2018-11-30 RX ADMIN — CEFTRIAXONE 100 GRAM(S): 500 INJECTION, POWDER, FOR SOLUTION INTRAMUSCULAR; INTRAVENOUS at 19:10

## 2018-11-30 RX ADMIN — Medication 25 MILLIGRAM(S): at 19:10

## 2018-11-30 NOTE — DISCHARGE NOTE ADULT - PATIENT PORTAL LINK FT
You can access the [facility] Portal, offered by [facility], by registering with the following website: [email or web address]

## 2018-11-30 NOTE — DISCHARGE NOTE ADULT - SECONDARY DIAGNOSIS.
Hypertension, unspecified type Mixed hyperlipidemia Coronary artery disease involving native coronary artery of native heart without angina pectoris Chronic systolic congestive heart failure Atrial fibrillation, unspecified type Acute cystitis without hematuria

## 2018-11-30 NOTE — DISCHARGE NOTE ADULT - CARE PROVIDER_API CALL
Jarad Perez), Cardiology  [address]  [address]  [place] [address]  Phone: [phone]  Fax: [phone]    Gino Masters), Cardiovascular Disease; Internal Medicine  [address] [place] [address]  Phone: [phone]  Fax: [phone]

## 2018-11-30 NOTE — PROGRESS NOTE ADULT - SUBJECTIVE AND OBJECTIVE BOX
85 yo F w/ history of HTN, CVA & atrial fibrillation p/w with dizziness & lightheadedness & was found to have a UTI. She is lying in bed in [facility], reports her dizziness has improved.    MEDICATIONS  (STANDING):  aspirin enteric coated 81 milliGRAM(s) Oral daily  cefTRIAXone   IVPB 1 Gram(s) IV Intermittent every 24 hours  cefTRIAXone   IVPB      enoxaparin Injectable 50 milliGRAM(s) SubCutaneous daily  lisinopril 5 milliGRAM(s) Oral daily  metoprolol tartrate 25 milliGRAM(s) Oral two times a day    MEDICATIONS  (PRN):  meclizine 25 milliGRAM(s) Oral every 8 hours PRN Dizziness      Allergies    No Known Allergies    Intolerances        Vital Signs Last 24 Hrs  T(C): 36 (30 Nov 2018 16:30), Max: 36.5 (30 Nov 2018 00:04)  T(F): 96.8 (30 Nov 2018 16:30), Max: 97.7 (30 Nov 2018 00:04)  HR: 103 (30 Nov 2018 16:30) (73 - 107)  BP: 137/65 (30 Nov 2018 16:30) (112/67 - 137/65)  RR: 19 (30 Nov 2018 16:30) (17 - 19)  SpO2: 98% (30 Nov 2018 16:30) (98% - 99%)    PHYSICAL EXAM:  GENERAL: NAD, well-groomed, well-developed  HEAD:  Atraumatic, Normocephalic  EYES: EOMI, PERRLA   NECK: Supple   NERVOUS SYSTEM:  Alert    CHEST/LUNG: Clear to auscultation bilaterally; No rales, rhonchi, wheezing, or rubs  HEART: irregularly irregular rhythm   ABDOMEN: Soft, Nontender, Nondistended; Bowel sounds present  EXTREMITIES: No clubbing, cyanosis, or edema    LABS:                        10.6   6.00  )-----------( 166      ( 29 Nov 2018 06:14 )             35.1     11-29    143  |  108  |  21  ----------------------------<  86  3.8   |  26  |  0.96    Ca    8.1<L>      29 Nov 2018 06:13  Phos  3.4     11-29  Mg     2.1     11-29          CAPILLARY BLOOD GLUCOSE          Culture - Urine (collected 27 Nov 2018 23:00)  Source: .Urine Clean Catch (Midstream)  Final Report (28 Nov 2018 21:52):    Culture grew 3 or more types of organisms which indicate    collection contamination; consider recollection only if clinically    indicated.      RADIOLOGY & ADDITIONAL TESTS:    11-29-18 @ 07:01  -  11-30-18 @ 07:00  --------------------------------------------------------  IN:    Oral Fluid: 236 mL  Total IN: 236 mL    OUT:  Total OUT: 0 mL    Total NET: 236 mL

## 2018-11-30 NOTE — DISCHARGE NOTE ADULT - HOSPITAL COURSE
83 yo F w/ history of HTN, CVA & atrial fibrillation p/w with dizziness & lightheadedness & was found to have a UTI and orthostatic hypotension. She was started on Rocephin. Her carotid dopplers were negative for stenosis and  MRI showed no acute intracranial hemorrhage or evidence of acute ischemia w/ a chronic right paramedian occipital lobe infarct. Echo showed EF 40 to 45% w/ severe mitral valve regurgitation. Cardio was consulted and arranged for the patient to be transferred to [facility] for possible TMVR.     Discharge time: 43 minutes

## 2018-11-30 NOTE — DISCHARGE NOTE ADULT - CARE PLAN
Principal Discharge DX:	Orthostatic hypotension  Goal:	Treat hypotension and MR  Assessment and plan of treatment:	Pt was transferred to [facility] for valve repair  Secondary Diagnosis:	Hypertension, unspecified type  Secondary Diagnosis:	Mixed hyperlipidemia  Secondary Diagnosis:	Coronary artery disease involving native coronary artery of native heart without angina pectoris  Secondary Diagnosis:	Chronic systolic congestive heart failure  Secondary Diagnosis:	Atrial fibrillation, unspecified type  Secondary Diagnosis:	Acute cystitis without hematuria

## 2018-11-30 NOTE — DISCHARGE NOTE ADULT - MEDICATION SUMMARY - MEDICATIONS TO TAKE
I will START or STAY ON the medications listed below when I get home from the hospital:    lisinopril 5 mg oral tablet  -- 1 tab(s) by mouth once a day  -- Indication: For Hypertension, unspecified type    Eliquis  -- Indication: For Atrial fibrillation    apixaban 2.5 mg oral tablet  -- 1 tab(s) by mouth every 12 hours  -- Indication: For Atrial fibrillation    atorvastatin 10 mg oral tablet  -- 1 tab(s) by mouth once a day (at bedtime)  -- Indication: For HLD    metoprolol tartrate 50 mg oral tablet  -- 1 tab(s) by mouth 2 times a day  -- Indication: For Hypertension, unspecified type    metoprolol  -- Indication: For Hypertension, unspecified type    furosemide 40 mg oral tablet  -- 1 tab(s) by mouth once a day  -- Indication: For Edema    pantoprazole 40 mg oral delayed release tablet  -- 1 tab(s) by mouth once a day  -- Indication: For GERD

## 2018-11-30 NOTE — PROGRESS NOTE ADULT - ASSESSMENT
Subjective Complaints:  Historian:         REVIEW OF SYSTEMS:  Eyes:  Good vision, no reported pain  ENT:  No sore throat, pain, runny nose, dysphagia  CV:  No pain, palpitatioins, hypo/hypertension  Resp:  No dyspnea, cough, tachypnea, wheezing  GI:  No pain, nausea, vomiting, diarrhea, constipatiion  Muscle:  No pain, weakness  Neuro:  No weakness, tingling, memory problems  Psych:  No fatigue, insomnia, mood problems, depression  Endocrine:  No polyuria, polydypsia, cold/heat intolerance    Vital Signs Last 24 Hrs  T(C): 36 (30 Nov 2018 16:30), Max: 36.5 (30 Nov 2018 00:04)  T(F): 96.8 (30 Nov 2018 16:30), Max: 97.7 (30 Nov 2018 00:04)  HR: 103 (30 Nov 2018 16:30) (73 - 107)  BP: 137/65 (30 Nov 2018 16:30) (112/67 - 137/65)  BP(mean): --  RR: 19 (30 Nov 2018 16:30) (17 - 19)  SpO2: 98% (30 Nov 2018 16:30) (98% - 99%)    GENERAL PHYSICAL EXAM:  General:  Appears stated age, well-groomed, well-nourished, no distress  HEENT:  NC/AT, patent nares w/ pink mucosa, OP clear w/o lesions, PERRL, EOMI, conjunctivae clear, no thyromegaly, nodules, adenopathy, no JVD  Chest:  Full & symmetric excursion, no increased effort, breath sounds clear  Cardiovascular:  Regular rhythm, S1, S2, no murmur/rub/S3/S4, no carotid/femoral/abdominal bruit, radial/pedal pulses 2+, no edema  Abdomen:  Soft, non-tender, non-distended, normoactive bowel sounds, no HSM  Extremities:  Gait & station:   Digits:   Nails:   Joints, Bones, Muscles:   ROM:   Stability:  Skin:  No rash/erythema/ecchymoses/petechiae/wounds/abscess/warm/dry  Musculoskeletal:  Full ROM in all joints w/o swelling/tenderness/effusion    NEUROLOGICAL EXAM:  HENT:  Normocephalic head; atraumatic head.  Neck supple.  ENT: normal looking.  Mental State:    Alert.  Fully oriented to person, place and date.  Coherent.  Speech clear and intact.  Cooperative.  Responds appropriately.    Cranial Nerves:  II-XII:   Pupils round and reactive to light and accommodation.  Extraocular movements full.  Visual fields full (no homonymous hemianopsia).  Visual acuity wnl.  Facial symmetry intact.  Tongue midline.  Motor Functions:  Moves all extremities.  No pronator drift of UE.  Claps hand well.  Hand  intact bilaterally.  Ambulatory.    Sensory Functions:   Intact to touch and pinprick to face and extremities.    Reflexes:  Deep tendon reflexes normoactive to biceps, knees and ankles.  Babinski absent (present).  Cerebellar Testing:    Finger to nose intact.  Nystagmus absent.  Neurovascular: Carotid auscultation full without bruits.      LABS:                        10.6   6.00  )-----------( 166      ( 29 Nov 2018 06:14 )             35.1     11-29    143  |  108  |  21  ----------------------------<  86  3.8   |  26  |  0.96    Ca    8.1<L>      29 Nov 2018 06:13  Phos  3.4     11-29  Mg     2.1     11-29     ass= awaeka lert speech fluent less dizziness   arm leg 4/5 no seziure mitral valve regurgitation  was on eliquis cardiology follow up mri paz old cva no new cva  will folow for pt rehab         RADIOLOGY & ADDITIONAL STUDIES:        Recommendations:  Brain MRI.  Carotid doppler.  Echocardiogram.  EEG.   DVT prophylaxis as ordered.  Medications:
Subjective Complaints:  Historian:         REVIEW OF SYSTEMS:  Eyes:  Good vision, no reported pain  ENT:  No sore throat, pain, runny nose, dysphagia  CV:  No pain, palpitatioins, hypo/hypertension  Resp:  No dyspnea, cough, tachypnea, wheezing  GI:  No pain, nausea, vomiting, diarrhea, constipatiion  Muscle:  No pain, weakness  Neuro:  No weakness, tingling, memory problems  Psych:  No fatigue, insomnia, mood problems, depression  Endocrine:  No polyuria, polydypsia, cold/heat intolerance    Vital Signs Last 24 Hrs  T(C): 36.1 (29 Nov 2018 16:41), Max: 36.4 (29 Nov 2018 13:07)  T(F): 96.9 (29 Nov 2018 16:41), Max: 97.6 (29 Nov 2018 13:07)  HR: 64 (29 Nov 2018 16:41) (64 - 118)  BP: 97/58 (29 Nov 2018 16:41) (97/58 - 149/89)  BP(mean): 72 (29 Nov 2018 16:41) (72 - 72)  RR: 17 (29 Nov 2018 16:41) (17 - 18)  SpO2: 100% (29 Nov 2018 16:41) (97% - 100%)    GENERAL PHYSICAL EXAM:  General:  Appears stated age, well-groomed, well-nourished, no distress  HEENT:  NC/AT, patent nares w/ pink mucosa, OP clear w/o lesions, PERRL, EOMI, conjunctivae clear, no thyromegaly, nodules, adenopathy, no JVD  Chest:  Full & symmetric excursion, no increased effort, breath sounds clear  Cardiovascular:  Regular rhythm, S1, S2, no murmur/rub/S3/S4, no carotid/femoral/abdominal bruit, radial/pedal pulses 2+, no edema  Abdomen:  Soft, non-tender, non-distended, normoactive bowel sounds, no HSM  Extremities:  Gait & station:   Digits:   Nails:   Joints, Bones, Muscles:   ROM:   Stability:  Skin:  No rash/erythema/ecchymoses/petechiae/wounds/abscess/warm/dry  Musculoskeletal:  Full ROM in all joints w/o swelling/tenderness/effusion    NEUROLOGICAL EXAM:  HENT:  Normocephalic head; atraumatic head.  Neck supple.  ENT: normal looking.  Mental State:    Alert.  Fully oriented to person, place and date.  Coherent.  Speech clear and intact.  Cooperative.  Responds appropriately.    Cranial Nerves:  II-XII:   Pupils round and reactive to light and accommodation.  Extraocular movements full.  Visual fields full (no homonymous hemianopsia).  Visual acuity wnl.  Facial symmetry intact.  Tongue midline.  Motor Functions:  Moves all extremities.  No pronator drift of UE.  Claps hand well.  Hand  intact bilaterally.  Ambulatory.    Sensory Functions:   Intact to touch and pinprick to face and extremities.    Reflexes:  Deep tendon reflexes normoactive to biceps, knees and ankles.  Babinski absent (present).  Cerebellar Testing:    Finger to nose intact.  Nystagmus absent.  Neurovascular: Carotid auscultation full without bruits.      LABS:                        10.6   6.00  )-----------( 166      ( 29 Nov 2018 06:14 )             35.1     11-29    143  |  108  |  21  ----------------------------<  86  3.8   |  26  |  0.96    Ca    8.1<L>      29 Nov 2018 06:13  Phos  3.4     11-29  Mg     2.1     11-29     ass= du fernandez speech fluent arm leg 4/5 less dizziness no seziure for pt rehab will follow non focla exam         RADIOLOGY & ADDITIONAL STUDIES:        Recommendations:  Brain MRI.  Carotid doppler.  Echocardiogram.  EEG.   DVT prophylaxis as ordered.  Medications:
83 yo F w/ history of HTN, CVA & atrial fibrillation p/w with dizziness & lightheadedness & was found to have a UTI.      Dizziness   - improved  - likely vertigo, may be due to UTI or orthostatic hypotension- will check for it   - neurology consulted  - carotid dopplers were negative for stenosis  - MRI showed no acute intracranial hemorrhage or evidence of acute ischemia w/ a chronic right paramedian occipital lobe infarct  - PT recommended discharge to home  - c/w PRN meclizine     Chronic Sys HF  - Echo showed EF 40 to 45% w/ severe mitral valve regurgitation - will consult cardio  - c/w lisinopril & lopressor    UTI  - c/w Rocephin  - urine cxs contaminated    Chronic atrial fibrillation  - c/w metoprolol  - switch patient to renally dosed Lovenox for now as she has severe valvular disease- will defer decision to restart Eliquis to cardio (patient is 84 years of age and body weights 49kg, so would restart a reduced Eliquis dose of 2.5 mg BID)    HTN  - c/w metoprolol & lisinopril    Prophylaxis:  GI: PO diet  DVT: Lovenox    Spoke w/ the pt's son, Lucius ([phone]), who reports that the valvular disease is known to the patient's cardiologist, Dr. Gino Masters ([phone]) but the patient has refused surgery in the past. She appears to be open to it on conversation w/ me and is only worried about receiving a bill for it.     Dispo: Will discharge to home if patient refuses surgery, otherwise will transfer her for a possible TMVR if cardio agrees.
85 yo F w/ history of HTN, CVA & atrial fibrillation p/w with dizziness & lightheadedness & was found to have a UTI.      Dizziness   - improved  - BP measurements c/w orthostatic hypotension   - neurology consulted  - carotid dopplers were negative for stenosis  - MRI showed no acute intracranial hemorrhage or evidence of acute ischemia w/ a chronic right paramedian occipital lobe infarct  - PT recommended discharge to home  - c/w PRN meclizine     Chronic Sys HF  - Echo showed EF 40 to 45% w/ severe mitral valve regurgitation - will consult cardio  - c/w lisinopril & lopressor    UTI  - c/w Rocephin  - urine cxs contaminated    Chronic atrial fibrillation  - c/w metoprolol  - c/w lovenox  - as per cardio, will restart Eliquis dose of 2.5 mg BID post surgery    HTN  - c/w metoprolol & lisinopril    Prophylaxis:  GI: PO diet  DVT: Lovenox    Dispo: Pt pending transfer to [facility] for valve repair.
85 yo F w/ history of HTN, CVA & atrial fibrillation p/w with dizziness & lightheadedness & was found to have a UTI.      Dizziness   - likely vertigo, may be due to UTI or orthostatic hypotension- will check for it   - neurology consulted  - carotid dopplers were negative for stenosis  - MRI showed no acute intracranial hemorrhage or evidence of acute ischemia w/ a chronic right paramedian occipital lobe infarct  - PT eval  - switch meclizine to PRN    Chronic Sys HF  - Echo showed EF 40 to 45% w/ severe mitral valve regurgitation - will consult cardio  - c/w lisinopril & lopressor    UTI  - c/w Rocephin  - urine cxs pending    Chronic atrial fibrillation  - c/w metoprolol  - switch patient to renally dosed Lovenox for now as she has severe valvular disease- will defer decision to restart Eliquis to cardio (patient is 84 years of age and body weights 49kg, so would restart a reduced Eliquis dose of 2.5 mg BID)    HTN  - c/w metoprolol & lisinopril    Prophylaxis:  GI: PO diet  DVT: Lovenox

## 2018-11-30 NOTE — PROGRESS NOTE ADULT - SUBJECTIVE AND OBJECTIVE BOX
84 year old female presents today c/o becoming sudden onset of dizziness as she was getting dressed this morning, pt describes feeling unsteady (-) chest pain (-) sob (-) nausea (-) vomiting (-) abdominal pain (-) headache (-) speech or visual change - the dizziness feels like the room moving and not like passing out - denies chest pain short of breath - never had similar problems - the dizziness occurs when she moves her head  - denies earache or fever (2018 18:20)      Chief Complaint:  Patient is a 84y old  Female who presents with a chief complaint of dizziness (2018 18:52)      Review of Systems:      Eyes:  Good vision, no reported pain  ENT:  No sore throat, pain, runny nose, dysphagia  CV:  No pain, palpitations, hypo/hypertension  Resp:  No dyspnea, cough, tachypnea, wheezing  GI:  No pain, nausea, vomiting, diarrhea, constipation             Social History/Family History  SOCHX:   tobacco,  -  alcohol    FMHX: FA/MO  - contributory       Discussed with:  PMD, Family    Physical Exam:    Vital Signs:  Vital Signs Last 24 Hrs  T(C): 36 (2018 05:02), Max: 36.1 (2018 16:22)  T(F): 96.8 (2018 05:02), Max: 97 (2018 16:22)  HR: 104 (2018 05:02) (67 - 104)  BP: 149/89 (2018 05:02) (128/56 - 156/66)  BP(mean): --  RR: 18 (2018 05:02) (18 - 18)  SpO2: 98% (2018 05:02) (97% - 100%)  Daily     Daily Weight in k.2 (2018 05:02)  I&O's Summary    2018 07:01  -  2018 07:00  --------------------------------------------------------  IN: 360 mL / OUT: 2 mL / NET: 358 mL        Chest:  decreased at bases  Cardiovascular:  Regular rhythm, S1, S2, no murmur/rub/S3/S4, no carotid/femoral/abdominal bruit, radial/pedal pulses 2+, no edema  Abdomen:  Soft, non-tender, non-distended, normoactive bowel sounds, no HSM      Laboratory:                          10.6   6.00  )-----------( 166      ( 2018 06:14 )             35.1         143  |  108  |  21  ----------------------------<  86  3.8   |  26  |  0.96    Ca    8.1<L>      2018 06:13  Phos  3.4       Mg     2.1         TPro  6.6  /  Alb  2.7<L>  /  TBili  0.6  /  DBili  x   /  AST  32  /  ALT  22  /  AlkPhos  96        CARDIAC MARKERS ( 2018 16:31 )  <.015 ng/mL / x     / 30 U/L / x     / 1.0 ng/mL  CARDIAC MARKERS ( 2018 11:41 )  <.015 ng/mL / x     / 32 U/L / x     / 1.4 ng/mL      CAPILLARY BLOOD GLUCOSE        LIVER FUNCTIONS - ( 2018 11:41 )  Alb: 2.7 g/dL / Pro: 6.6 gm/dL / ALK PHOS: 96 U/L / ALT: 22 U/L / AST: 32 U/L / GGT: x           PT/INR - ( 2018 11:41 )   PT: 16.7 sec;   INR: 1.47 ratio         PTT - ( 2018 11:41 )  PTT:35.5 sec  Urinalysis Basic - ( 2018 17:44 )    Color: Yellow / Appearance: Slightly Turbid / S.010 / pH: x  Gluc: x / Ketone: Negative  / Bili: Negative / Urobili: Negative mg/dL   Blood: x / Protein: 15 mg/dL / Nitrite: Negative   Leuk Esterase: Moderate / RBC: 3-5 /HPF / WBC >50   Sq Epi: x / Non Sq Epi: Few / Bacteria: Many        Assessment:  Full assessment in am  UTI, ABX coverage   Noted severe MR  Review of previous TTE noted moderate to severe MR,   Treat medically continues  I discussed with patient in detail, and spoke with son who allowed his wife Ingris to d/w me.   The patient appears of sound mind, although family is stating she may be confused at times  The family wishes a d/w their Cardiologist, Dr. Recinos, i provided my service and office number to have this Cardiologist reach out to me to discuss  I have not received a call, after d/w patient she states [facility] and therefore [facility] was offered for possible TMVR consideration   Transfer to [facility] today has been arranged

## 2018-12-04 DIAGNOSIS — I34.0 NONRHEUMATIC MITRAL (VALVE) INSUFFICIENCY: ICD-10-CM

## 2018-12-04 DIAGNOSIS — N39.0 URINARY TRACT INFECTION, SITE NOT SPECIFIED: ICD-10-CM

## 2018-12-04 DIAGNOSIS — E78.2 MIXED HYPERLIPIDEMIA: ICD-10-CM

## 2018-12-04 DIAGNOSIS — R26.81 UNSTEADINESS ON FEET: ICD-10-CM

## 2018-12-04 DIAGNOSIS — I48.2 CHRONIC ATRIAL FIBRILLATION: ICD-10-CM

## 2018-12-04 DIAGNOSIS — I25.10 ATHEROSCLEROTIC HEART DISEASE OF NATIVE CORONARY ARTERY WITHOUT ANGINA PECTORIS: ICD-10-CM

## 2018-12-04 DIAGNOSIS — K21.9 GASTRO-ESOPHAGEAL REFLUX DISEASE WITHOUT ESOPHAGITIS: ICD-10-CM

## 2018-12-04 DIAGNOSIS — I95.1 ORTHOSTATIC HYPOTENSION: ICD-10-CM

## 2018-12-04 DIAGNOSIS — E43 UNSPECIFIED SEVERE PROTEIN-CALORIE MALNUTRITION: ICD-10-CM

## 2018-12-04 DIAGNOSIS — I11.0 HYPERTENSIVE HEART DISEASE WITH HEART FAILURE: ICD-10-CM

## 2018-12-04 DIAGNOSIS — Z79.01 LONG TERM (CURRENT) USE OF ANTICOAGULANTS: ICD-10-CM

## 2018-12-04 DIAGNOSIS — I50.22 CHRONIC SYSTOLIC (CONGESTIVE) HEART FAILURE: ICD-10-CM

## 2018-12-04 DIAGNOSIS — Z86.73 PERSONAL HISTORY OF TRANSIENT ISCHEMIC ATTACK (TIA), AND CEREBRAL INFARCTION WITHOUT RESIDUAL DEFICITS: ICD-10-CM

## 2018-12-04 DIAGNOSIS — R42 DIZZINESS AND GIDDINESS: ICD-10-CM

## 2018-12-12 ENCOUNTER — INPATIENT (INPATIENT)
Facility: HOSPITAL | Age: 83
LOS: 7 days | Discharge: NURSING, REHAB OR LONG-TERM CARE | End: 2018-12-20
Attending: GENERAL PRACTICE | Admitting: GENERAL PRACTICE
Payer: MEDICARE

## 2018-12-12 VITALS
RESPIRATION RATE: 18 BRPM | TEMPERATURE: 99 F | OXYGEN SATURATION: 100 % | SYSTOLIC BLOOD PRESSURE: 128 MMHG | HEART RATE: 105 BPM | DIASTOLIC BLOOD PRESSURE: 100 MMHG

## 2018-12-12 DIAGNOSIS — R42 DIZZINESS AND GIDDINESS: ICD-10-CM

## 2018-12-12 LAB
ALBUMIN SERPL ELPH-MCNC: 3.9 G/DL — SIGNIFICANT CHANGE UP (ref 3.3–5)
ALP SERPL-CCNC: 133 U/L — HIGH (ref 40–120)
ALT FLD-CCNC: 14 U/L — SIGNIFICANT CHANGE UP (ref 4–33)
ANISOCYTOSIS BLD QL: SLIGHT — SIGNIFICANT CHANGE UP
APTT BLD: 33.7 SEC — SIGNIFICANT CHANGE UP (ref 27.5–36.3)
AST SERPL-CCNC: 31 U/L — SIGNIFICANT CHANGE UP (ref 4–32)
BASOPHILS # BLD AUTO: 0.07 K/UL — SIGNIFICANT CHANGE UP (ref 0–0.2)
BASOPHILS NFR BLD AUTO: 0.8 % — SIGNIFICANT CHANGE UP (ref 0–2)
BASOPHILS NFR SPEC: 1.7 % — SIGNIFICANT CHANGE UP (ref 0–2)
BILIRUB SERPL-MCNC: 0.5 MG/DL — SIGNIFICANT CHANGE UP (ref 0.2–1.2)
BLASTS # FLD: 0 % — SIGNIFICANT CHANGE UP (ref 0–0)
BUN SERPL-MCNC: 37 MG/DL — HIGH (ref 7–23)
CALCIUM SERPL-MCNC: 9.1 MG/DL — SIGNIFICANT CHANGE UP (ref 8.4–10.5)
CHLORIDE SERPL-SCNC: 99 MMOL/L — SIGNIFICANT CHANGE UP (ref 98–107)
CO2 SERPL-SCNC: 26 MMOL/L — SIGNIFICANT CHANGE UP (ref 22–31)
CREAT SERPL-MCNC: 1.7 MG/DL — HIGH (ref 0.5–1.3)
EOSINOPHIL # BLD AUTO: 0.17 K/UL — SIGNIFICANT CHANGE UP (ref 0–0.5)
EOSINOPHIL NFR BLD AUTO: 2 % — SIGNIFICANT CHANGE UP (ref 0–6)
EOSINOPHIL NFR FLD: 0 % — SIGNIFICANT CHANGE UP (ref 0–6)
GIANT PLATELETS BLD QL SMEAR: PRESENT — SIGNIFICANT CHANGE UP
GLUCOSE SERPL-MCNC: 94 MG/DL — SIGNIFICANT CHANGE UP (ref 70–99)
HCT VFR BLD CALC: 36.3 % — SIGNIFICANT CHANGE UP (ref 34.5–45)
HGB BLD-MCNC: 11.1 G/DL — LOW (ref 11.5–15.5)
IMM GRANULOCYTES # BLD AUTO: 0.05 # — SIGNIFICANT CHANGE UP
IMM GRANULOCYTES NFR BLD AUTO: 0.6 % — SIGNIFICANT CHANGE UP (ref 0–1.5)
INR BLD: 1.25 — HIGH (ref 0.88–1.17)
LYMPHOCYTES # BLD AUTO: 3.14 K/UL — SIGNIFICANT CHANGE UP (ref 1–3.3)
LYMPHOCYTES # BLD AUTO: 37.2 % — SIGNIFICANT CHANGE UP (ref 13–44)
LYMPHOCYTES NFR SPEC AUTO: 33.9 % — SIGNIFICANT CHANGE UP (ref 13–44)
MCHC RBC-ENTMCNC: 25.2 PG — LOW (ref 27–34)
MCHC RBC-ENTMCNC: 30.6 % — LOW (ref 32–36)
MCV RBC AUTO: 82.3 FL — SIGNIFICANT CHANGE UP (ref 80–100)
METAMYELOCYTES # FLD: 0 % — SIGNIFICANT CHANGE UP (ref 0–1)
MICROCYTES BLD QL: SLIGHT — SIGNIFICANT CHANGE UP
MONOCYTES # BLD AUTO: 0.65 K/UL — SIGNIFICANT CHANGE UP (ref 0–0.9)
MONOCYTES NFR BLD AUTO: 7.7 % — SIGNIFICANT CHANGE UP (ref 2–14)
MONOCYTES NFR BLD: 4.4 % — SIGNIFICANT CHANGE UP (ref 2–9)
MYELOCYTES NFR BLD: 0 % — SIGNIFICANT CHANGE UP (ref 0–0)
NEUTROPHIL AB SER-ACNC: 51.3 % — SIGNIFICANT CHANGE UP (ref 43–77)
NEUTROPHILS # BLD AUTO: 4.35 K/UL — SIGNIFICANT CHANGE UP (ref 1.8–7.4)
NEUTROPHILS NFR BLD AUTO: 51.7 % — SIGNIFICANT CHANGE UP (ref 43–77)
NEUTS BAND # BLD: 0 % — SIGNIFICANT CHANGE UP (ref 0–6)
NRBC # FLD: 0 — SIGNIFICANT CHANGE UP
OTHER - HEMATOLOGY %: 0 — SIGNIFICANT CHANGE UP
OVALOCYTES BLD QL SMEAR: SLIGHT — SIGNIFICANT CHANGE UP
PLATELET # BLD AUTO: 231 K/UL — SIGNIFICANT CHANGE UP (ref 150–400)
PLATELET COUNT - ESTIMATE: NORMAL — SIGNIFICANT CHANGE UP
PMV BLD: 11.3 FL — SIGNIFICANT CHANGE UP (ref 7–13)
POIKILOCYTOSIS BLD QL AUTO: SLIGHT — SIGNIFICANT CHANGE UP
POTASSIUM SERPL-MCNC: 4.5 MMOL/L — SIGNIFICANT CHANGE UP (ref 3.5–5.3)
POTASSIUM SERPL-SCNC: 4.5 MMOL/L — SIGNIFICANT CHANGE UP (ref 3.5–5.3)
PROMYELOCYTES # FLD: 0 % — SIGNIFICANT CHANGE UP (ref 0–0)
PROT SERPL-MCNC: 7.7 G/DL — SIGNIFICANT CHANGE UP (ref 6–8.3)
PROTHROM AB SERPL-ACNC: 14.4 SEC — HIGH (ref 9.8–13.1)
RBC # BLD: 4.41 M/UL — SIGNIFICANT CHANGE UP (ref 3.8–5.2)
RBC # FLD: 21.2 % — HIGH (ref 10.3–14.5)
SMUDGE CELLS # BLD: PRESENT — SIGNIFICANT CHANGE UP
SODIUM SERPL-SCNC: 141 MMOL/L — SIGNIFICANT CHANGE UP (ref 135–145)
VARIANT LYMPHS # BLD: 8.7 % — SIGNIFICANT CHANGE UP
WBC # BLD: 8.43 K/UL — SIGNIFICANT CHANGE UP (ref 3.8–10.5)
WBC # FLD AUTO: 8.43 K/UL — SIGNIFICANT CHANGE UP (ref 3.8–10.5)

## 2018-12-12 PROCEDURE — 71046 X-RAY EXAM CHEST 2 VIEWS: CPT | Mod: 26

## 2018-12-12 RX ORDER — SODIUM CHLORIDE 9 MG/ML
1000 INJECTION INTRAMUSCULAR; INTRAVENOUS; SUBCUTANEOUS ONCE
Qty: 0 | Refills: 0 | Status: COMPLETED | OUTPATIENT
Start: 2018-12-12 | End: 2018-12-12

## 2018-12-12 NOTE — ED PROVIDER NOTE - OBJECTIVE STATEMENT
84 F Afib on eliquis, prior CVA, HTN, recent valve repair via catheterization, states she was dizzy earlier this afternoon, lasted on the order of minutes and resolved shortly after standign down (pt was wlking at the time). She has had these sx once in the last week, she was able to wait for them to resolve on their own in the past but caled her daughter this time who rec'd her to debbie 911. When dizzy shes reports total body weakness. endorses palpitations and occasional Sims that Is not new for her. Denies cp, blood in stool/urine, urianry sx, f/c, cough. 84 F Afib on eliquis, prior CVA, HTN, recent valve repair via catheterization, states she was dizzy earlier this afternoon, lasted on the order of minutes and resolved shortly after standign down (pt was wlking at the time). She has had these sx once in the last week, she was able to wait for them to resolve on their own in the past but caled her daughter this time who rec'd her to debbie 911. When dizzy shes reports total body weakness. endorses palpitations and occasional Sims that Is not new for her. Denies cp, blood in stool/urine, urinary sx, f/c, cough. 84 F Afib on eliquis, prior CVA, HTN, states she was dizzy earlier this afternoon, lasted on the order of minutes and resolved shortly after standign down (pt was wlking at the time). She has had these sx once in the last week, she was able to wait for them to resolve on their own in the past but caled her daughter this time who rec'd her to debbie 911. When dizzy shes reports total body weakness. endorses palpitations and occasional Sims that Is not new for her. Denies cp, blood in stool/urine, urinary sx, f/c, cough.

## 2018-12-12 NOTE — ED ADULT NURSE NOTE - NSIMPLEMENTINTERV_GEN_ALL_ED
Implemented All Fall with Harm Risk Interventions:  [place] to call system. Call bell, personal items and telephone within reach. Instruct patient to call for assistance. Room bathroom lighting operational. Non-slip footwear when patient is off stretcher. Physically safe environment: no spills, clutter or unnecessary equipment. Stretcher in lowest position, wheels locked, appropriate side rails in place. Provide visual cue, wrist band, yellow gown, etc. Monitor gait and stability. Monitor for mental status changes and reorient to person, place, and time. Review medications for side effects contributing to fall risk. Reinforce activity limits and safety measures with patient and family. Provide visual clues: red socks.

## 2018-12-12 NOTE — ED ADULT TRIAGE NOTE - CHIEF COMPLAINT QUOTE
dizziness that began this AM while attempting to cook dinner,  reports dizziness is worse with walking and not present at rest, denies syncope. significant cardiac hx. denies chest pain or SOB

## 2018-12-12 NOTE — ED PROVIDER NOTE - ATTENDING CONTRIBUTION TO CARE
DR. BOOTH, ATTENDING MD-  I performed a face to face bedside interview with patient regarding history of present illness, review of symptoms and past medical history. I completed an independent physical exam.  I have discussed patient's plan of care with the resident.   Documentation as above in the note.    83 y/o female h/o afib on eliquis, htn, cva, mitral regurg, uti here with c/o dizziness and lightheadedness.  Occurs with exertion, resolves with rest.  No room spinning sensation.  States dec water intake, drinks lots of soda.  Denies f/c, ha, neck stiffness, cp, cough, abd pain, n/v/d, dysuria, rash.  Afebrile, vs wnl, appears fatigued, dry mm, diminished at bases otherwise ctabil, s1s2 irreg irreg no m/r/g, abd soft non ttp no r/g, no cva tenderness b/l, no leg swelling b/l, no rash.  Eval for anemia vs lyte abn vs atypical acs vs occult infection.  Obtain ekg, cbc, cmp, trop, bnp, cxr, give ivf bolus, will need admission for further care and evaluation.

## 2018-12-12 NOTE — ED ADULT NURSE NOTE - OBJECTIVE STATEMENT
Pt received to room 12 a/o x 3 c/o an episode of dizziness while standing making dinner. Pt states the dizziness only occurs when she is standing and moving around and this was the first time this happened to her. Pt is not dizzy at this time. Respirations even and unlabored. Lung sounds clear with equal chest rise bilaterally. ABD is soft, non tender, non distended with normal active bowel sounds No complaints of chest pain, headache, nausea, dizziness, vomiting  SOB, fever, chills verbalized. pt placed on cardiac monitor in AFIB.

## 2018-12-12 NOTE — ED PROVIDER NOTE - MEDICAL DECISION MAKING DETAILS
85 y/o F recent valvular repair, HTN, afib on eliquis, presenting with several short episodes of dizziness. On exam has no focal findings. Likely due to dehydration vs worsening cardiac function given crackels and recent valvular repair and low EF. Unlikely due to polypharmacy as pt reports no change in her medication doses. May also be due to vertigo however pt neuro exam wnl. Plan: labs, cxr, ct head, UA, will likely require admission given similar sx in setting of UTI

## 2018-12-12 NOTE — ED PROVIDER NOTE - PHYSICAL EXAMINATION
General: Alert and Oriented. No apparent distress.  Head: Normocephalic and atraumatic.  Eyes: PERRLA with EOMI.  Neck: Supple. Trachea midline.   Cardiac: Normal S1 and S2 w/ tachycardia, no murmur  Pulmonary: B/l crackles at the bases with good air movement  Abdominal: Soft, non-tender.  Neurologic: A&O x3. CN 2-12 intact. Strength 5/5 throughout b/l UE and LE. Sensation intact to light touch throughout b/l UE and LE. Finger to nose intact. Gait wnl.   Musculoskeletal: Strength appropriate in all 4 extremities for age with no limited ROM. R groin area w/o hematoma, clean bandaid covering prior cath site.  Skin: Color appropriate for race. Intact, warm, and well-perfused.  Psychiatric: Appropriate mood and affect. No apparent risk to self or others.

## 2018-12-12 NOTE — ED PROVIDER NOTE - PMH
Atrial fibrillation    Atrial fibrillation    CHF (congestive heart failure)    Coronary Artery Disease    Hyperlipemia    Hypertension    Hypertension, unspecified type    s/p Angioplasty with Stent

## 2018-12-13 DIAGNOSIS — N17.9 ACUTE KIDNEY FAILURE, UNSPECIFIED: ICD-10-CM

## 2018-12-13 DIAGNOSIS — I25.10 ATHEROSCLEROTIC HEART DISEASE OF NATIVE CORONARY ARTERY WITHOUT ANGINA PECTORIS: ICD-10-CM

## 2018-12-13 DIAGNOSIS — R42 DIZZINESS AND GIDDINESS: ICD-10-CM

## 2018-12-13 DIAGNOSIS — I10 ESSENTIAL (PRIMARY) HYPERTENSION: ICD-10-CM

## 2018-12-13 DIAGNOSIS — I48.91 UNSPECIFIED ATRIAL FIBRILLATION: ICD-10-CM

## 2018-12-13 DIAGNOSIS — E78.5 HYPERLIPIDEMIA, UNSPECIFIED: ICD-10-CM

## 2018-12-13 DIAGNOSIS — Z29.9 ENCOUNTER FOR PROPHYLACTIC MEASURES, UNSPECIFIED: ICD-10-CM

## 2018-12-13 LAB
APPEARANCE UR: CLEAR — SIGNIFICANT CHANGE UP
BACTERIA # UR AUTO: HIGH
BASOPHILS # BLD AUTO: 0.05 K/UL — SIGNIFICANT CHANGE UP (ref 0–0.2)
BASOPHILS NFR BLD AUTO: 0.8 % — SIGNIFICANT CHANGE UP (ref 0–2)
BILIRUB UR-MCNC: NEGATIVE — SIGNIFICANT CHANGE UP
BLOOD UR QL VISUAL: SIGNIFICANT CHANGE UP
BUN SERPL-MCNC: 39 MG/DL — HIGH (ref 7–23)
CALCIUM SERPL-MCNC: 8.5 MG/DL — SIGNIFICANT CHANGE UP (ref 8.4–10.5)
CHLORIDE SERPL-SCNC: 104 MMOL/L — SIGNIFICANT CHANGE UP (ref 98–107)
CHOLEST SERPL-MCNC: 133 MG/DL — SIGNIFICANT CHANGE UP (ref 120–199)
CK MB BLD-MCNC: 1.77 NG/ML — SIGNIFICANT CHANGE UP (ref 1–4.7)
CK MB BLD-MCNC: SIGNIFICANT CHANGE UP (ref 0–2.5)
CK SERPL-CCNC: 34 U/L — SIGNIFICANT CHANGE UP (ref 25–170)
CO2 SERPL-SCNC: 25 MMOL/L — SIGNIFICANT CHANGE UP (ref 22–31)
COLOR SPEC: YELLOW — SIGNIFICANT CHANGE UP
CREAT SERPL-MCNC: 1.65 MG/DL — HIGH (ref 0.5–1.3)
EOSINOPHIL # BLD AUTO: 0.12 K/UL — SIGNIFICANT CHANGE UP (ref 0–0.5)
EOSINOPHIL NFR BLD AUTO: 2 % — SIGNIFICANT CHANGE UP (ref 0–6)
GLUCOSE SERPL-MCNC: 86 MG/DL — SIGNIFICANT CHANGE UP (ref 70–99)
GLUCOSE UR-MCNC: NEGATIVE — SIGNIFICANT CHANGE UP
HBA1C BLD-MCNC: 6.3 % — HIGH (ref 4–5.6)
HCT VFR BLD CALC: 33.7 % — LOW (ref 34.5–45)
HDLC SERPL-MCNC: 43 MG/DL — LOW (ref 45–65)
HGB BLD-MCNC: 9.9 G/DL — LOW (ref 11.5–15.5)
HYALINE CASTS # UR AUTO: NEGATIVE — SIGNIFICANT CHANGE UP
IMM GRANULOCYTES # BLD AUTO: 0.03 # — SIGNIFICANT CHANGE UP
IMM GRANULOCYTES NFR BLD AUTO: 0.5 % — SIGNIFICANT CHANGE UP (ref 0–1.5)
KETONES UR-MCNC: NEGATIVE — SIGNIFICANT CHANGE UP
LEUKOCYTE ESTERASE UR-ACNC: SIGNIFICANT CHANGE UP
LIPID PNL WITH DIRECT LDL SERPL: 73 MG/DL — SIGNIFICANT CHANGE UP
LYMPHOCYTES # BLD AUTO: 2.36 K/UL — SIGNIFICANT CHANGE UP (ref 1–3.3)
LYMPHOCYTES # BLD AUTO: 38.5 % — SIGNIFICANT CHANGE UP (ref 13–44)
MAGNESIUM SERPL-MCNC: 2 MG/DL — SIGNIFICANT CHANGE UP (ref 1.6–2.6)
MCHC RBC-ENTMCNC: 24.9 PG — LOW (ref 27–34)
MCHC RBC-ENTMCNC: 29.4 % — LOW (ref 32–36)
MCV RBC AUTO: 84.7 FL — SIGNIFICANT CHANGE UP (ref 80–100)
MONOCYTES # BLD AUTO: 0.48 K/UL — SIGNIFICANT CHANGE UP (ref 0–0.9)
MONOCYTES NFR BLD AUTO: 7.8 % — SIGNIFICANT CHANGE UP (ref 2–14)
NEUTROPHILS # BLD AUTO: 3.09 K/UL — SIGNIFICANT CHANGE UP (ref 1.8–7.4)
NEUTROPHILS NFR BLD AUTO: 50.4 % — SIGNIFICANT CHANGE UP (ref 43–77)
NITRITE UR-MCNC: NEGATIVE — SIGNIFICANT CHANGE UP
NRBC # FLD: 0 — SIGNIFICANT CHANGE UP
NT-PROBNP SERPL-SCNC: 2217 PG/ML — SIGNIFICANT CHANGE UP
PH UR: 6 — SIGNIFICANT CHANGE UP (ref 5–8)
PHOSPHATE SERPL-MCNC: 4.3 MG/DL — SIGNIFICANT CHANGE UP (ref 2.5–4.5)
PLATELET # BLD AUTO: 197 K/UL — SIGNIFICANT CHANGE UP (ref 150–400)
PMV BLD: 11.1 FL — SIGNIFICANT CHANGE UP (ref 7–13)
POTASSIUM SERPL-MCNC: 4.8 MMOL/L — SIGNIFICANT CHANGE UP (ref 3.5–5.3)
POTASSIUM SERPL-SCNC: 4.8 MMOL/L — SIGNIFICANT CHANGE UP (ref 3.5–5.3)
PROT UR-MCNC: NEGATIVE — SIGNIFICANT CHANGE UP
RBC # BLD: 3.98 M/UL — SIGNIFICANT CHANGE UP (ref 3.8–5.2)
RBC # FLD: 21 % — HIGH (ref 10.3–14.5)
RBC CASTS # UR COMP ASSIST: HIGH (ref 0–?)
SODIUM SERPL-SCNC: 144 MMOL/L — SIGNIFICANT CHANGE UP (ref 135–145)
SP GR SPEC: 1.01 — SIGNIFICANT CHANGE UP (ref 1–1.04)
SQUAMOUS # UR AUTO: SIGNIFICANT CHANGE UP
TRIGL SERPL-MCNC: 116 MG/DL — SIGNIFICANT CHANGE UP (ref 10–149)
TROPONIN T, HIGH SENSITIVITY: 33 NG/L — SIGNIFICANT CHANGE UP (ref ?–14)
TROPONIN T, HIGH SENSITIVITY: 36 NG/L — SIGNIFICANT CHANGE UP (ref ?–14)
TSH SERPL-MCNC: 2.15 UIU/ML — SIGNIFICANT CHANGE UP (ref 0.27–4.2)
UROBILINOGEN FLD QL: NORMAL — SIGNIFICANT CHANGE UP
WBC # BLD: 6.13 K/UL — SIGNIFICANT CHANGE UP (ref 3.8–10.5)
WBC # FLD AUTO: 6.13 K/UL — SIGNIFICANT CHANGE UP (ref 3.8–10.5)
WBC UR QL: HIGH (ref 0–?)

## 2018-12-13 PROCEDURE — 70450 CT HEAD/BRAIN W/O DYE: CPT | Mod: 26

## 2018-12-13 RX ORDER — METOPROLOL TARTRATE 50 MG
50 TABLET ORAL DAILY
Qty: 0 | Refills: 0 | Status: DISCONTINUED | OUTPATIENT
Start: 2018-12-13 | End: 2018-12-20

## 2018-12-13 RX ORDER — ATORVASTATIN CALCIUM 80 MG/1
10 TABLET, FILM COATED ORAL AT BEDTIME
Qty: 0 | Refills: 0 | Status: DISCONTINUED | OUTPATIENT
Start: 2018-12-13 | End: 2018-12-20

## 2018-12-13 RX ORDER — APIXABAN 2.5 MG/1
2.5 TABLET, FILM COATED ORAL EVERY 12 HOURS
Qty: 0 | Refills: 0 | Status: DISCONTINUED | OUTPATIENT
Start: 2018-12-13 | End: 2018-12-13

## 2018-12-13 RX ORDER — METOPROLOL TARTRATE 50 MG
0 TABLET ORAL
Qty: 0 | Refills: 0 | COMMUNITY

## 2018-12-13 RX ORDER — ASPIRIN/CALCIUM CARB/MAGNESIUM 324 MG
81 TABLET ORAL DAILY
Qty: 0 | Refills: 0 | Status: DISCONTINUED | OUTPATIENT
Start: 2018-12-13 | End: 2018-12-20

## 2018-12-13 RX ORDER — SODIUM CHLORIDE 9 MG/ML
3 INJECTION INTRAMUSCULAR; INTRAVENOUS; SUBCUTANEOUS EVERY 8 HOURS
Qty: 0 | Refills: 0 | Status: DISCONTINUED | OUTPATIENT
Start: 2018-12-13 | End: 2018-12-20

## 2018-12-13 RX ORDER — PANTOPRAZOLE SODIUM 20 MG/1
40 TABLET, DELAYED RELEASE ORAL
Qty: 0 | Refills: 0 | Status: DISCONTINUED | OUTPATIENT
Start: 2018-12-13 | End: 2018-12-20

## 2018-12-13 RX ORDER — APIXABAN 2.5 MG/1
2.5 TABLET, FILM COATED ORAL EVERY 12 HOURS
Qty: 0 | Refills: 0 | Status: DISCONTINUED | OUTPATIENT
Start: 2018-12-13 | End: 2018-12-20

## 2018-12-13 RX ADMIN — PANTOPRAZOLE SODIUM 40 MILLIGRAM(S): 20 TABLET, DELAYED RELEASE ORAL at 07:07

## 2018-12-13 RX ADMIN — Medication 50 MILLIGRAM(S): at 07:07

## 2018-12-13 RX ADMIN — SODIUM CHLORIDE 3 MILLILITER(S): 9 INJECTION INTRAMUSCULAR; INTRAVENOUS; SUBCUTANEOUS at 13:39

## 2018-12-13 RX ADMIN — SODIUM CHLORIDE 3 MILLILITER(S): 9 INJECTION INTRAMUSCULAR; INTRAVENOUS; SUBCUTANEOUS at 20:58

## 2018-12-13 RX ADMIN — ATORVASTATIN CALCIUM 10 MILLIGRAM(S): 80 TABLET, FILM COATED ORAL at 20:59

## 2018-12-13 RX ADMIN — SODIUM CHLORIDE 3 MILLILITER(S): 9 INJECTION INTRAMUSCULAR; INTRAVENOUS; SUBCUTANEOUS at 05:27

## 2018-12-13 RX ADMIN — APIXABAN 2.5 MILLIGRAM(S): 2.5 TABLET, FILM COATED ORAL at 17:48

## 2018-12-13 RX ADMIN — SODIUM CHLORIDE 1000 MILLILITER(S): 9 INJECTION INTRAMUSCULAR; INTRAVENOUS; SUBCUTANEOUS at 00:11

## 2018-12-13 NOTE — H&P ADULT - HISTORY OF PRESENT ILLNESS
83 y/o F with hx of afib on eliquis, prior CVA, HTN, recent cardiac valve repair presents with dizziness this afternoon. Patient states she had dizziness when she was changing position. She states she does not feel it when she is resting. The symptoms exacerbates when she changes her position. 85 y/o F with hx of afib on eliquis, prior CVA, HTN, recent cardiac valve repair presents with dizziness this afternoon. Patient states she had dizziness when she was changing position. She states she does not feel it when she is resting. The symptoms exacerbates when she changes her position.  She recently was admitted [facility] about 1 month ago for similar symptoms. She was found to have mitral valve disease. She was transferred to [facility] for valve repair. Patient is not s ure what exactly was done for her at [facility]. Denies fever, chills, cough, falls, LOC, chest pain, abdominal pain, nausea, vomiting, melena, hematochezia, LE edema, dysuria, diarrhea, or constipation. 85 y/o F with hx of afib on eliquis, prior CVA, HTN, severe MR, presents with dizziness this afternoon. Patient states she had dizziness when she was changing position. She states she does not feel it when she is resting. The symptoms exacerbates when she changes her position.    She recently was admitted about 1 month ago for similar symptoms. She was found to have mitral valve disease. She was transferred to [facility] for valve repair.   Patient is not sure what exactly was done for her at [facility]. per cardio, was offered to repair MR but not done yet  Denies fever, chills, cough, falls, LOC, chest pain, abdominal pain, nausea, vomiting, melena, hematochezia, LE edema, dysuria, diarrhea, or constipation.

## 2018-12-13 NOTE — H&P ADULT - ATTENDING COMMENTS
Patient seen, examined, and interviewed by me, case discussed with me, chart reviewed, agree with above H/P as reviewed and edited by me.  See  full note above.  discussed with cardio

## 2018-12-13 NOTE — H&P ADULT - NSHPLABSRESULTS_GEN_ALL_CORE
EKG: Afib with  BPM, TWI in AVL                          11.1   8.43  )-----------( 231      ( 12 Dec 2018 21:38 )             36.3     12-12    141  |  99  |  37<H>  ----------------------------<  94  4.5   |  26  |  1.70<H>    Ca    9.1      12 Dec 2018 21:38    TPro  7.7  /  Alb  3.9  /  TBili  0.5  /  DBili  x   /  AST  31  /  ALT  14  /  AlkPhos  133<H>  12-12    Troponin T, High Sensitivity: 36: ---------------------***PLEASE NOTE***----------------------  Rapid changes upward or downward in high-sensitivity  troponin levels strongly suggest acute myocardial injury.  Hemolysis may falsely lower results. Renal impairment may  increase results.    Normal: <6 - 14 ng/L  Indeterminate: 15 - 51 ng/L  Elevated: >51 ng/L    Please see "[email or web address] on the [facility]  intranet for more information. ng/L (12.12.18 @ 21:38)

## 2018-12-13 NOTE — H&P ADULT - PROBLEM SELECTOR PLAN 1
Admit to tele  check cbc, bmp, a1c,flp, tsh, trend CE  echo ordered given patient had recent valve repair  check orthostatics  CTH ordered given patient on eliquis  f/u MD note Admit to tele  check cbc, bmp, a1c,flp, tsh, trend CE  echo ordered  check orthostatics  CTH ordered given patient on eliquis>> negative

## 2018-12-13 NOTE — H&P ADULT - ASSESSMENT
83 y/o F with hx of afib on eliquis, prior CVA, HTN, recent cardiac valve repair presents with dizziness this afternoon. admitted for near syncope

## 2018-12-13 NOTE — CONSULT NOTE ADULT - SUBJECTIVE AND OBJECTIVE BOX
CHIEF COMPLAINT: dizziness    HISTORY OF PRESENT ILLNESS:  83 y/o F with hx of afib on eliquis, prior CVA, HTN, CAD with prior stents, recent admission to [facility] for dizziness workup there showed severe LV dysfunction EF 30-35, mod-severe MR, non-obstructive CAD on LHC and KEI showing mod-severe MR with anatomy suitable for luke-clip, patient ultimately treated with diuretics and meclizine and deferred surgical repair at that time. She now presents with dizziness that started yesterday afternoon.  Patient states she had dizziness when she was changing position. She states she does not feel it when she is resting. The symptoms exacerbates when she changes her position.  SDenies fever, chills, cough, falls, LOC, chest pain, abdominal pain, nausea, vomiting, melena, hematochezia, LE edema, dysuria, diarrhea, or constipation.     Allergies  No Known Allergies      MEDICATIONS:  metoprolol tartrate 50 milliGRAM(s) Oral daily  pantoprazole    Tablet 40 milliGRAM(s) Oral before breakfast  atorvastatin 10 milliGRAM(s) Oral at bedtime  sodium chloride 0.9% lock flush 3 milliLiter(s) IV Push every 8 hours      PAST MEDICAL & SURGICAL HISTORY:  Atrial fibrillation  Hypertension, unspecified type  CHF (congestive heart failure)  Atrial fibrillation  s/p Angioplasty with Stent  Coronary Artery Disease  Hyperlipemia  Hypertension  No significant past surgical history  No significant past surgical history      FAMILY HISTORY:  Family history of hypertension (Father)      SOCIAL HISTORY:    non smoker. lives with son and daughter-in law. has home attendant      REVIEW OF SYSTEMS:  See HPI, otherwise complete 10 point review of systems negative    [ ] All others negative	      PHYSICAL EXAM:  T(C): 36.4 (12-13-18 @ 06:47), Max: 37.1 (12-12-18 @ 18:48)  HR: 99 (12-13-18 @ 06:47) (79 - 105)  BP: 111/74 (12-13-18 @ 06:47) (101/55 - 128/100)  RR: 18 (12-13-18 @ 06:47) (16 - 18)  SpO2: 100% (12-13-18 @ 06:47) (100% - 100%)  Wt(kg): --  I&O's Summary      Appearance: No Acute Distress	  HEENT:  Normal oral mucosa, PERRL, EOMI	  Cardiovascular: Normal S1 S2, No JVD, 2/6 isi  Respiratory: Lungs clear to auscultation bilaterally  Gastrointestinal:  Soft, Non-tender, + BS	  Skin: No rashes, No ecchymoses, No cyanosis	  Neurologic: Non-focal  Extremities: No clubbing, cyanosis or edema  Vascular: Peripheral pulses palpable 2+ bilaterally  Psychiatry: A & O x 3, Mood & affect appropriate    Laboratory Data:	 	    CBC Full  -  ( 13 Dec 2018 05:00 )  WBC Count : 6.13 K/uL  Hemoglobin : 9.9 g/dL  Hematocrit : 33.7 %  Platelet Count - Automated : 197 K/uL  Mean Cell Volume : 84.7 fL  Mean Cell Hemoglobin : 24.9 pg  Mean Cell Hemoglobin Concentration : 29.4 %  Auto Neutrophil # : 3.09 K/uL  Auto Lymphocyte # : 2.36 K/uL  Auto Monocyte # : 0.48 K/uL  Auto Eosinophil # : 0.12 K/uL  Auto Basophil # : 0.05 K/uL  Auto Neutrophil % : 50.4 %  Auto Lymphocyte % : 38.5 %  Auto Monocyte % : 7.8 %  Auto Eosinophil % : 2.0 %  Auto Basophil % : 0.8 %    12-13    144  |  104  |  39<H>  ----------------------------<  86  4.8   |  25  |  1.65<H>  12-12    141  |  99  |  37<H>  ----------------------------<  94  4.5   |  26  |  1.70<H>    Ca    8.5      13 Dec 2018 05:00  Ca    9.1      12 Dec 2018 21:38  Phos  4.3     12-13  Mg     2.0     12-13    TPro  7.7  /  Alb  3.9  /  TBili  0.5  /  DBili  x   /  AST  31  /  ALT  14  /  AlkPhos  133<H>  12-12      proBNP: Serum Pro-Brain Natriuretic Peptide: 2217 pg/mL (12-12 @ 21:38)          Interpretation of Telemetry: rate controlled af	    ECG: af 100 	    Assessment:  -atrial fibrillation on eliquis  -prior CVA  -CAD s/p stents s/p LHC st black 11/18 without any new obstructive lesions  -mod-severe MR s/p KEI  wayne with anatomy reportedly suitable for luke clipping/TMVR  -ETELVINA  -dizziness    Plan of Care:  -ETELVINA likely pre-renal in the setting of overdiuresis  -hold diuretics for now (lasix 40mg po daily and aldactone 25mg po daily)  -c/w metoprolol  -can resume home ace-i once cr dowtrends  -no need for repeat cardiac work up at this time  -will discuss with family if they would like transfer to [place] for possible intervention on mitral valve  -c/w tele monitoring  -resume asa, and c/w statin for CAD  -resume eliquis for afib and hx of stroke      Greater than 60 minutes spent on total encounter; more than 50% of the visit was spent counseling and/or coordinating care by the attending physician.   	  Fernando Masters MD   Cardiovascular Diseases  [phone]

## 2018-12-13 NOTE — PATIENT PROFILE ADULT - BRADEN ACTIVITY
Please either talk to spouse, Mario, or use  to convey results:  1. Cholesterol continues to show a low bad cholesterol which is excellent. She has some high triglycerides but not at a level that I would increase medication for  2. CBC shows good basic blood counts  3. CMP shows good liver, kidney, electrolytes  4. Thyroid normal   (3) walks occasionally

## 2018-12-14 LAB
BACTERIA UR CULT: SIGNIFICANT CHANGE UP
BASOPHILS # BLD AUTO: 0.05 K/UL — SIGNIFICANT CHANGE UP (ref 0–0.2)
BASOPHILS NFR BLD AUTO: 0.8 % — SIGNIFICANT CHANGE UP (ref 0–2)
BUN SERPL-MCNC: 36 MG/DL — HIGH (ref 7–23)
CALCIUM SERPL-MCNC: 8.7 MG/DL — SIGNIFICANT CHANGE UP (ref 8.4–10.5)
CHLORIDE SERPL-SCNC: 103 MMOL/L — SIGNIFICANT CHANGE UP (ref 98–107)
CO2 SERPL-SCNC: 23 MMOL/L — SIGNIFICANT CHANGE UP (ref 22–31)
CREAT SERPL-MCNC: 1.55 MG/DL — HIGH (ref 0.5–1.3)
EOSINOPHIL # BLD AUTO: 0.17 K/UL — SIGNIFICANT CHANGE UP (ref 0–0.5)
EOSINOPHIL NFR BLD AUTO: 2.6 % — SIGNIFICANT CHANGE UP (ref 0–6)
GLUCOSE SERPL-MCNC: 92 MG/DL — SIGNIFICANT CHANGE UP (ref 70–99)
HCT VFR BLD CALC: 31.1 % — LOW (ref 34.5–45)
HGB BLD-MCNC: 9.4 G/DL — LOW (ref 11.5–15.5)
IMM GRANULOCYTES # BLD AUTO: 0.03 # — SIGNIFICANT CHANGE UP
IMM GRANULOCYTES NFR BLD AUTO: 0.5 % — SIGNIFICANT CHANGE UP (ref 0–1.5)
LYMPHOCYTES # BLD AUTO: 2.1 K/UL — SIGNIFICANT CHANGE UP (ref 1–3.3)
LYMPHOCYTES # BLD AUTO: 32.5 % — SIGNIFICANT CHANGE UP (ref 13–44)
MAGNESIUM SERPL-MCNC: 2.1 MG/DL — SIGNIFICANT CHANGE UP (ref 1.6–2.6)
MCHC RBC-ENTMCNC: 25.1 PG — LOW (ref 27–34)
MCHC RBC-ENTMCNC: 30.2 % — LOW (ref 32–36)
MCV RBC AUTO: 82.9 FL — SIGNIFICANT CHANGE UP (ref 80–100)
MONOCYTES # BLD AUTO: 0.62 K/UL — SIGNIFICANT CHANGE UP (ref 0–0.9)
MONOCYTES NFR BLD AUTO: 9.6 % — SIGNIFICANT CHANGE UP (ref 2–14)
NEUTROPHILS # BLD AUTO: 3.49 K/UL — SIGNIFICANT CHANGE UP (ref 1.8–7.4)
NEUTROPHILS NFR BLD AUTO: 54 % — SIGNIFICANT CHANGE UP (ref 43–77)
NRBC # FLD: 0 — SIGNIFICANT CHANGE UP
PLATELET # BLD AUTO: 178 K/UL — SIGNIFICANT CHANGE UP (ref 150–400)
PMV BLD: 10.9 FL — SIGNIFICANT CHANGE UP (ref 7–13)
POTASSIUM SERPL-MCNC: 4.3 MMOL/L — SIGNIFICANT CHANGE UP (ref 3.5–5.3)
POTASSIUM SERPL-SCNC: 4.3 MMOL/L — SIGNIFICANT CHANGE UP (ref 3.5–5.3)
RBC # BLD: 3.75 M/UL — LOW (ref 3.8–5.2)
RBC # FLD: 21.1 % — HIGH (ref 10.3–14.5)
SODIUM SERPL-SCNC: 140 MMOL/L — SIGNIFICANT CHANGE UP (ref 135–145)
SPECIMEN SOURCE: SIGNIFICANT CHANGE UP
WBC # BLD: 6.46 K/UL — SIGNIFICANT CHANGE UP (ref 3.8–10.5)
WBC # FLD AUTO: 6.46 K/UL — SIGNIFICANT CHANGE UP (ref 3.8–10.5)

## 2018-12-14 PROCEDURE — 93306 TTE W/DOPPLER COMPLETE: CPT | Mod: 26

## 2018-12-14 RX ORDER — MECLIZINE HCL 12.5 MG
12.5 TABLET ORAL THREE TIMES A DAY
Qty: 0 | Refills: 0 | Status: DISCONTINUED | OUTPATIENT
Start: 2018-12-14 | End: 2018-12-20

## 2018-12-14 RX ADMIN — Medication 50 MILLIGRAM(S): at 05:09

## 2018-12-14 RX ADMIN — SODIUM CHLORIDE 3 MILLILITER(S): 9 INJECTION INTRAMUSCULAR; INTRAVENOUS; SUBCUTANEOUS at 13:59

## 2018-12-14 RX ADMIN — Medication 81 MILLIGRAM(S): at 17:08

## 2018-12-14 RX ADMIN — ATORVASTATIN CALCIUM 10 MILLIGRAM(S): 80 TABLET, FILM COATED ORAL at 21:53

## 2018-12-14 RX ADMIN — APIXABAN 2.5 MILLIGRAM(S): 2.5 TABLET, FILM COATED ORAL at 05:09

## 2018-12-14 RX ADMIN — PANTOPRAZOLE SODIUM 40 MILLIGRAM(S): 20 TABLET, DELAYED RELEASE ORAL at 05:09

## 2018-12-14 RX ADMIN — APIXABAN 2.5 MILLIGRAM(S): 2.5 TABLET, FILM COATED ORAL at 17:08

## 2018-12-14 RX ADMIN — SODIUM CHLORIDE 3 MILLILITER(S): 9 INJECTION INTRAMUSCULAR; INTRAVENOUS; SUBCUTANEOUS at 05:09

## 2018-12-14 RX ADMIN — SODIUM CHLORIDE 3 MILLILITER(S): 9 INJECTION INTRAMUSCULAR; INTRAVENOUS; SUBCUTANEOUS at 21:51

## 2018-12-14 NOTE — PROGRESS NOTE ADULT - ASSESSMENT
_________________________________________________________________________________________  ========>>  M E D I C A L   A T T E N D I N G    F O L L O W  U P  N O T E  <<=========  -----------------------------------------------------------------------------------------------------    - Patient seen and examined by me approximately sixty minutes ago.   - In summary,  GAVIOTA SINGH is a 84y year old woman who originally presented with dizziness   - Patient today overall doing ok, comfortable, eating OK.  overall improved, only mil lightheadedness with sudden turning / moves..     ==================>> REVIEW OF SYSTEM <<=================    GEN: no fever, no chills, no pain  RESP: no SOB at rest , no cough, no sputum  CVS: no chest pain, no palpitations, no edema  GI: no abdominal pain, no nausea, no constipation, no diarrhea  : no dysuria, no frequency, no hematuria  Neuro: no headache, no dizziness  Derm : no itching, no rash    ==================>> PHYSICAL EXAM <<=================    GEN: A&O X 3 , NAD , comfortable, cachectic   HEENT: NCAT, PERRL, MMM, hearing intact  Neck: supple , no JVD  CVS: S1S2 , regular   PULM: CTA B/L,  no W/R/R appreciated  ABD.: soft. non tender, non distended,  bowel sounds present  Extrem: intact pulses , no edema   PSYCH : normal mood,  not anxious      ==================>> MEDICATIONS <<====================    MEDICATIONS  (STANDING):  apixaban 2.5 milliGRAM(s) Oral every 12 hours  aspirin enteric coated 81 milliGRAM(s) Oral daily  atorvastatin 10 milliGRAM(s) Oral at bedtime  metoprolol tartrate 50 milliGRAM(s) Oral daily  pantoprazole    Tablet 40 milliGRAM(s) Oral before breakfast  sodium chloride 0.9% lock flush 3 milliLiter(s) IV Push every 8 hours    ==================>> VITAL SIGNS <<==================    T(C): 36.4 (18 @ 05:08), Max: 36.5 (18 @ 13:27)  HR: 98 (18 @ 05:08) (97 - 100)  BP: 131/79 (18 @ 05:08) (114/93 - 131/79)  RR: 18 (18 @ 05:08) (17 - 18)  SpO2: 100% (18 @ 05:08) (100% - 100%)     ==================>> LAB AND IMAGING <<==================                        9.4    6.46  )-----------( 178      ( 14 Dec 2018 03:40 )             31.1        Hemoglobin:   9.4 <<==,  9.9 <<==,  11.1 <<==    140  |  103  |  36<H>  ----------------------------<  92  4.3   |  23  |  1.55<H>    Creatinine:  1.55  <<==, 1.65  <<==, 1.70  <<==    Ca    8.7      14 Dec 2018 03:40  Phos  4.3       Mg     2.1         TPro  7.7  /  Alb  3.9  /  TBili  0.5  /  DBili  x   /  AST  31  /  ALT  14  /  AlkPhos  133<H>  12-12    PT/INR - ( 12 Dec 2018 21:38 )   PT: 14.4 SEC;   INR: 1.25     PTT - ( 12 Dec 2018 21:38 )  PTT:33.7 SEC               Urinalysis Basic - ( 13 Dec 2018 04:21 )  Color: YELLOW / Appearance: CLEAR / S.015 / pH: 6.0  Gluc: NEGATIVE / Ketone: NEGATIVE  / Bili: NEGATIVE / Urobili: NORMAL   Blood: TRACE / Protein: NEGATIVE / Nitrite: NEGATIVE   Leuk Esterase: LARGE / RBC: 11-25 / WBC 26-50   Sq Epi: OCC / Non Sq Epi: x / Bacteria: MODERATE    TSH:      2.15   (18)       ,     3.400   (18)           Lipid profile:  (18)     Total: 133     LDL  : 73     HDL  :43     TG   :116     HgA1C: 6.3  (18)            ___________________________________________________________________________________  ===============>>  A S S E S S M E N T   A N D   P L A N <<===============  ------------------------------------------------------------------------------------------    · Assessment		  83 y/o F with hx of afib on eliquis, prior CVA, HTN, recent cardiac valve repair presents with dizziness this afternoon. admitted for near syncope    Problem/Plan - 1:  ·  Problem: Dizziness, likely vertigo     pt's valvulopathy / MR may be also contributing ( pt was also dehydrated / overdiuresed)  tele  echo   orthostatics  hold diuretics  monitor vitals, labs  meclizine PRN  cardio appreciated     Problem/Plan - 2:  ·  Problem: ETELVINA , likley due to overdiuresis  Monitor Cr as improving   avoid nephrotoxic meds  holding lasix  resume per cardio recom    Problem/Plan - 3:  ·  Problem: Hypertension,   cont metoprolol.     Problem/Plan - 4:  ·  Problem: Atrial fibrillation.  Plan: cont metoprolol  Eliquis on hold until CTH is done.   [id number] score of 8.     Problem/Plan - 5:  ·  Problem: CAD, HLD  cont statin.     Problem/Plan - 6:  Problem: anemia  likely of chronic disease  will check anemia panel  monitor     -GI/DVT Prophylaxis.    --------------------------------------------  Case discussed with pt, cardio   Education given on findings and plan of care  ___________________________  H. JOSE LUIS Connell.  Pager: [phone]

## 2018-12-14 NOTE — PHYSICAL THERAPY INITIAL EVALUATION ADULT - PERTINENT HX OF CURRENT PROBLEM, REHAB EVAL
85 y/o F with hx of afib on eliquis, prior CVA, HTN, recent cardiac valve repair presents with dizziness this afternoon. admitted for near syncope

## 2018-12-14 NOTE — PROGRESS NOTE ADULT - SUBJECTIVE AND OBJECTIVE BOX
Cardiovascular Disease Progress Note    Overnight events: No acute events overnight.  cr still elevated but downtrending. dizziness improving. no cp/sob/pnd/orthopnea  Otherwise review of systems negative    Objective Findings:  T(C): 36.4 (18 @ 05:08), Max: 36.5 (18 @ 13:27)  HR: 98 (18 @ 05:08) (97 - 100)  BP: 131/79 (18 @ 05:08) (114/93 - 131/79)  RR: 18 (18 @ 05:08) (17 - 18)  SpO2: 100% (18 @ 05:08) (100% - 100%)  Wt(kg): --  Daily     Daily Weight in k.7 (14 Dec 2018 07:11)      Physical Exam:  Gen: NAD  HEENT: EOMI  CV: RRR, normal S1 + S2, no m/r/g  Lungs: CTAB  Abd: soft, non-tender  Ext: No edema    Telemetry: af rate controlled    Laboratory Data:                        9.4    6.46  )-----------( 178      ( 14 Dec 2018 03:40 )             31.1     12-    140  |  103  |  36<H>  ----------------------------<  92  4.3   |  23  |  1.55<H>    Ca    8.7      14 Dec 2018 03:40  Phos  4.3     12-13  Mg     2.1     -    TPro  7.7  /  Alb  3.9  /  TBili  0.5  /  DBili  x   /  AST  31  /  ALT  14  /  AlkPhos  133<H>  12-12    PT/INR - ( 12 Dec 2018 21:38 )   PT: 14.4 SEC;   INR: 1.25          PTT - ( 12 Dec 2018 21:38 )  PTT:33.7 SEC  CARDIAC MARKERS ( 13 Dec 2018 05:00 )  x     / x     / 34 u/L / 1.77 ng/mL / x              Inpatient Medications:  MEDICATIONS  (STANDING):  apixaban 2.5 milliGRAM(s) Oral every 12 hours  aspirin enteric coated 81 milliGRAM(s) Oral daily  atorvastatin 10 milliGRAM(s) Oral at bedtime  metoprolol tartrate 50 milliGRAM(s) Oral daily  pantoprazole    Tablet 40 milliGRAM(s) Oral before breakfast  sodium chloride 0.9% lock flush 3 milliLiter(s) IV Push every 8 hours      Assessment:  -atrial fibrillation on eliquis  -prior CVA  -CAD s/p stents s/p C [facility]  without any new obstructive lesions  -mod-severe MR s/p KEI [facility] with anatomy reportedly suitable for luke clipping/TMVR  -ETELVINA  -dizziness    Plan of Care:  -ETELVINA likely pre-renal in the setting of overdiuresis  -hold diuretics for now (lasix 40mg po daily and aldactone 25mg po daily)  -c/w metoprolol for systolic HF  -can resume home ace-i once cr dowtrends. cont to hold for now  -no need for repeat cardiac work up at this time  -will discuss with family if they would like transfer to [place] for possible intervention on mitral valve. per patient wishes, will attempt medical management for now  -c/w tele monitoring  -c/w asa and statin for CAD  -c/w eliquis for afib and hx of stroke      Over 25 minutes spent on total encounter; more than 50% of the visit was spent counseling and/or coordinating care by the attending physician.      Fernando Masters MD   Cardiovascular Disease  [phone]

## 2018-12-15 LAB
BASOPHILS # BLD AUTO: 0.04 K/UL — SIGNIFICANT CHANGE UP (ref 0–0.2)
BASOPHILS NFR BLD AUTO: 0.7 % — SIGNIFICANT CHANGE UP (ref 0–2)
BUN SERPL-MCNC: 26 MG/DL — HIGH (ref 7–23)
CALCIUM SERPL-MCNC: 8.7 MG/DL — SIGNIFICANT CHANGE UP (ref 8.4–10.5)
CHLORIDE SERPL-SCNC: 106 MMOL/L — SIGNIFICANT CHANGE UP (ref 98–107)
CO2 SERPL-SCNC: 23 MMOL/L — SIGNIFICANT CHANGE UP (ref 22–31)
CREAT SERPL-MCNC: 1.18 MG/DL — SIGNIFICANT CHANGE UP (ref 0.5–1.3)
EOSINOPHIL # BLD AUTO: 0.17 K/UL — SIGNIFICANT CHANGE UP (ref 0–0.5)
EOSINOPHIL NFR BLD AUTO: 3.1 % — SIGNIFICANT CHANGE UP (ref 0–6)
FERRITIN SERPL-MCNC: 42.16 NG/ML — SIGNIFICANT CHANGE UP (ref 15–150)
FOLATE SERPL-MCNC: 13 NG/ML — SIGNIFICANT CHANGE UP (ref 4.7–20)
GLUCOSE SERPL-MCNC: 86 MG/DL — SIGNIFICANT CHANGE UP (ref 70–99)
HCT VFR BLD CALC: 31.3 % — LOW (ref 34.5–45)
HGB BLD-MCNC: 9.5 G/DL — LOW (ref 11.5–15.5)
IMM GRANULOCYTES # BLD AUTO: 0.02 # — SIGNIFICANT CHANGE UP
IMM GRANULOCYTES NFR BLD AUTO: 0.4 % — SIGNIFICANT CHANGE UP (ref 0–1.5)
IRON SATN MFR SERPL: 254 UG/DL — SIGNIFICANT CHANGE UP (ref 140–530)
IRON SATN MFR SERPL: 34 UG/DL — SIGNIFICANT CHANGE UP (ref 30–160)
LYMPHOCYTES # BLD AUTO: 1.81 K/UL — SIGNIFICANT CHANGE UP (ref 1–3.3)
LYMPHOCYTES # BLD AUTO: 32.8 % — SIGNIFICANT CHANGE UP (ref 13–44)
MAGNESIUM SERPL-MCNC: 2 MG/DL — SIGNIFICANT CHANGE UP (ref 1.6–2.6)
MCHC RBC-ENTMCNC: 24.7 PG — LOW (ref 27–34)
MCHC RBC-ENTMCNC: 30.4 % — LOW (ref 32–36)
MCV RBC AUTO: 81.3 FL — SIGNIFICANT CHANGE UP (ref 80–100)
MONOCYTES # BLD AUTO: 0.52 K/UL — SIGNIFICANT CHANGE UP (ref 0–0.9)
MONOCYTES NFR BLD AUTO: 9.4 % — SIGNIFICANT CHANGE UP (ref 2–14)
NEUTROPHILS # BLD AUTO: 2.96 K/UL — SIGNIFICANT CHANGE UP (ref 1.8–7.4)
NEUTROPHILS NFR BLD AUTO: 53.6 % — SIGNIFICANT CHANGE UP (ref 43–77)
NRBC # FLD: 0.02 — SIGNIFICANT CHANGE UP
PLATELET # BLD AUTO: 183 K/UL — SIGNIFICANT CHANGE UP (ref 150–400)
PMV BLD: 11.1 FL — SIGNIFICANT CHANGE UP (ref 7–13)
POTASSIUM SERPL-MCNC: 4.1 MMOL/L — SIGNIFICANT CHANGE UP (ref 3.5–5.3)
POTASSIUM SERPL-SCNC: 4.1 MMOL/L — SIGNIFICANT CHANGE UP (ref 3.5–5.3)
RBC # BLD: 3.85 M/UL — SIGNIFICANT CHANGE UP (ref 3.8–5.2)
RBC # FLD: 20.9 % — HIGH (ref 10.3–14.5)
SODIUM SERPL-SCNC: 142 MMOL/L — SIGNIFICANT CHANGE UP (ref 135–145)
UIBC SERPL-MCNC: 219.9 UG/DL — SIGNIFICANT CHANGE UP (ref 110–370)
VIT B12 SERPL-MCNC: 708 PG/ML — SIGNIFICANT CHANGE UP (ref 200–900)
WBC # BLD: 5.52 K/UL — SIGNIFICANT CHANGE UP (ref 3.8–10.5)
WBC # FLD AUTO: 5.52 K/UL — SIGNIFICANT CHANGE UP (ref 3.8–10.5)

## 2018-12-15 RX ORDER — LISINOPRIL 2.5 MG/1
2.5 TABLET ORAL DAILY
Qty: 0 | Refills: 0 | Status: DISCONTINUED | OUTPATIENT
Start: 2018-12-15 | End: 2018-12-20

## 2018-12-15 RX ADMIN — SODIUM CHLORIDE 3 MILLILITER(S): 9 INJECTION INTRAMUSCULAR; INTRAVENOUS; SUBCUTANEOUS at 06:25

## 2018-12-15 RX ADMIN — Medication 81 MILLIGRAM(S): at 08:04

## 2018-12-15 RX ADMIN — LISINOPRIL 2.5 MILLIGRAM(S): 2.5 TABLET ORAL at 12:57

## 2018-12-15 RX ADMIN — APIXABAN 2.5 MILLIGRAM(S): 2.5 TABLET, FILM COATED ORAL at 06:25

## 2018-12-15 RX ADMIN — SODIUM CHLORIDE 3 MILLILITER(S): 9 INJECTION INTRAMUSCULAR; INTRAVENOUS; SUBCUTANEOUS at 12:35

## 2018-12-15 RX ADMIN — Medication 50 MILLIGRAM(S): at 06:25

## 2018-12-15 RX ADMIN — ATORVASTATIN CALCIUM 10 MILLIGRAM(S): 80 TABLET, FILM COATED ORAL at 22:10

## 2018-12-15 RX ADMIN — SODIUM CHLORIDE 3 MILLILITER(S): 9 INJECTION INTRAMUSCULAR; INTRAVENOUS; SUBCUTANEOUS at 22:10

## 2018-12-15 RX ADMIN — APIXABAN 2.5 MILLIGRAM(S): 2.5 TABLET, FILM COATED ORAL at 16:44

## 2018-12-15 RX ADMIN — PANTOPRAZOLE SODIUM 40 MILLIGRAM(S): 20 TABLET, DELAYED RELEASE ORAL at 06:25

## 2018-12-15 NOTE — PROGRESS NOTE ADULT - ASSESSMENT
_________________________________________________________________________________________  ========>>  M E D I C A L   A T T E N D I N G    F O L L O W  U P  N O T E  <<=========  -----------------------------------------------------------------------------------------------------    - Patient seen and examined by me approximately sixty minutes ago.   - In summary,  GAVIOTA SINGH is a 84y year old woman who originally presented with dizziness   - Patient today overall doing ok, comfortable, eating OK.  overall improved, wants to go home!     ==================>> REVIEW OF SYSTEM <<=================    GEN: no fever, no chills, no pain  RESP: no SOB at rest , no cough, no sputum  CVS: no chest pain, no palpitations, no edema  GI: no abdominal pain, no nausea, no constipation, no diarrhea  : no dysuria, no frequency, no hematuria  Neuro: no headache, no dizziness  Derm : no itching, no rash    ==================>> PHYSICAL EXAM <<=================    GEN: A&O X 3 , NAD , comfortable, cachectic   HEENT: NCAT, PERRL, MMM, hearing intact  Neck: supple , no JVD  CVS: S1S2 , regular   PULM: CTA B/L,  no W/R/R appreciated  ABD.: soft. non tender, non distended,  bowel sounds present  Extrem: intact pulses , no edema   PSYCH : normal mood,  not anxious       ==================>> MEDICATIONS <<====================    apixaban 2.5 milliGRAM(s) Oral every 12 hours  aspirin enteric coated 81 milliGRAM(s) Oral daily  atorvastatin 10 milliGRAM(s) Oral at bedtime  metoprolol tartrate 50 milliGRAM(s) Oral daily  pantoprazole    Tablet 40 milliGRAM(s) Oral before breakfast  sodium chloride 0.9% lock flush 3 milliLiter(s) IV Push every 8 hours    MEDICATIONS  (PRN):  meclizine 12.5 milliGRAM(s) Oral three times a day PRN Dizziness    ==================>> VITAL SIGNS <<==================    Vital Signs Last 24 Hrs  T(C): 36.6 (12-15-18 @ 06:23)  T(F): 97.8 (12-15-18 @ 06:23), Max: 97.8 (12-15-18 @ 06:23)  HR: 103 (12-15-18 @ 06:23) (89 - 103)  BP: 130/78 (12-15-18 @ 06:23)  RR: 18 (12-15-18 @ 06:23) (16 - 18)  SpO2: 100% (12-15-18 @ 06:23) (100% - 100%)       ==================>> LAB AND IMAGING <<==================                        9.5    5.52  )-----------( 183      ( 15 Dec 2018 06:15 )             31.3     Hemoglobin:   9.5 <<==,  9.4 <<==,  9.9 <<==,  11.1 <<==    142  |  106  |  26<H>  ----------------------------<  86  4.1   |  23  |  1.18    Ca    8.7      15 Dec 2018 06:15  Mg     2.0     12-15    < from: Transthoracic Echocardiogram (12.14.18 @ 20:52) >  CONCLUSIONS:  1. Mitral annular calcification and calcified mitral  leaflets with decreased diastolic opening. Moderate mitral regurgitation.  2. Calcified trileaflet aortic valve with normal opening.  3. Severely dilated left atrium.  LA volume index = 60 cc/m2.  4. Normal left ventricular internal dimensions and wall thicknesses.  5. Moderate global left ventricular systolic dysfunction.  Septal motion is consistent with LBBB.  6. Increased E/e'  is consistent with elevated left ventricular filling pressure.  7. Normal right ventricular size and function.  *** Compared with echocardiogram of 8/10/2018, no  significant changes noted.  < end of copied text >    ___________________________________________________________________________________  ===============>>  A S S E S S M E N T   A N D   P L A N <<===============  ------------------------------------------------------------------------------------------    · Assessment		  85 y/o F with hx of afib on eliquis, prior CVA, HTN, recent cardiac valve repair presents with dizziness this afternoon. admitted for near syncope    Problem/Plan - 1:  ·  Problem: Dizziness, likely vertigo     pt's valvulopathy / MR may be also contributing ( pt was also dehydrated / overdiuresed)  tele  orthostatics positive yesterday, improved today  hold diuretics  monitor vitals, labs  meclizine PRN  cardio appreciated     Problem/Plan - 2:  ·  Problem: ETELVINA , likley due to overdiuresis >> resolve   holding lasix until DC  will add ACEI per cardio     Problem/Plan - 3:  ·  Problem: Hypertension,   cont metoprolol.   lisinopril as above    Problem/Plan - 4:  ·  Problem: Atrial fibrillation.  Plan: cont metoprolol  Eliquis     Problem/Plan - 5:  ·  Problem: CAD, HLD, likely chronic systolic HF, stable   cont statin.   Continue Current medications otherwise and monitor.     Problem/Plan - 6:  Problem: anemia  likely of chronic disease  will check anemia panel  monitor     -GI/DVT Prophylaxis.  - possible DC planing home tomorrow   --------------------------------------------  Case discussed with pt, cardio   Education given on findings and plan of care  ___________________________  H. JOSE LUIS Connell.  Pager: [phone]

## 2018-12-16 LAB
24R-OH-CALCIDIOL SERPL-MCNC: 10.7 NG/ML — LOW (ref 30–80)
BASOPHILS # BLD AUTO: 0.05 K/UL — SIGNIFICANT CHANGE UP (ref 0–0.2)
BASOPHILS NFR BLD AUTO: 1 % — SIGNIFICANT CHANGE UP (ref 0–2)
BUN SERPL-MCNC: 24 MG/DL — HIGH (ref 7–23)
CALCIUM SERPL-MCNC: 8.5 MG/DL — SIGNIFICANT CHANGE UP (ref 8.4–10.5)
CHLORIDE SERPL-SCNC: 106 MMOL/L — SIGNIFICANT CHANGE UP (ref 98–107)
CO2 SERPL-SCNC: 21 MMOL/L — LOW (ref 22–31)
CREAT SERPL-MCNC: 1.13 MG/DL — SIGNIFICANT CHANGE UP (ref 0.5–1.3)
EOSINOPHIL # BLD AUTO: 0.17 K/UL — SIGNIFICANT CHANGE UP (ref 0–0.5)
EOSINOPHIL NFR BLD AUTO: 3.3 % — SIGNIFICANT CHANGE UP (ref 0–6)
GLUCOSE SERPL-MCNC: 102 MG/DL — HIGH (ref 70–99)
HCT VFR BLD CALC: 31.5 % — LOW (ref 34.5–45)
HGB BLD-MCNC: 9.5 G/DL — LOW (ref 11.5–15.5)
IMM GRANULOCYTES # BLD AUTO: 0.02 # — SIGNIFICANT CHANGE UP
IMM GRANULOCYTES NFR BLD AUTO: 0.4 % — SIGNIFICANT CHANGE UP (ref 0–1.5)
LYMPHOCYTES # BLD AUTO: 1.53 K/UL — SIGNIFICANT CHANGE UP (ref 1–3.3)
LYMPHOCYTES # BLD AUTO: 29.5 % — SIGNIFICANT CHANGE UP (ref 13–44)
MAGNESIUM SERPL-MCNC: 2 MG/DL — SIGNIFICANT CHANGE UP (ref 1.6–2.6)
MCHC RBC-ENTMCNC: 24.8 PG — LOW (ref 27–34)
MCHC RBC-ENTMCNC: 30.2 % — LOW (ref 32–36)
MCV RBC AUTO: 82.2 FL — SIGNIFICANT CHANGE UP (ref 80–100)
MONOCYTES # BLD AUTO: 0.49 K/UL — SIGNIFICANT CHANGE UP (ref 0–0.9)
MONOCYTES NFR BLD AUTO: 9.4 % — SIGNIFICANT CHANGE UP (ref 2–14)
NEUTROPHILS # BLD AUTO: 2.93 K/UL — SIGNIFICANT CHANGE UP (ref 1.8–7.4)
NEUTROPHILS NFR BLD AUTO: 56.4 % — SIGNIFICANT CHANGE UP (ref 43–77)
NRBC # FLD: 0 — SIGNIFICANT CHANGE UP
PLATELET # BLD AUTO: 191 K/UL — SIGNIFICANT CHANGE UP (ref 150–400)
PMV BLD: 11.5 FL — SIGNIFICANT CHANGE UP (ref 7–13)
POTASSIUM SERPL-MCNC: 4 MMOL/L — SIGNIFICANT CHANGE UP (ref 3.5–5.3)
POTASSIUM SERPL-SCNC: 4 MMOL/L — SIGNIFICANT CHANGE UP (ref 3.5–5.3)
RBC # BLD: 3.83 M/UL — SIGNIFICANT CHANGE UP (ref 3.8–5.2)
RBC # FLD: 20.7 % — HIGH (ref 10.3–14.5)
SODIUM SERPL-SCNC: 139 MMOL/L — SIGNIFICANT CHANGE UP (ref 135–145)
WBC # BLD: 5.19 K/UL — SIGNIFICANT CHANGE UP (ref 3.8–10.5)
WBC # FLD AUTO: 5.19 K/UL — SIGNIFICANT CHANGE UP (ref 3.8–10.5)

## 2018-12-16 RX ORDER — CHOLECALCIFEROL (VITAMIN D3) 125 MCG
2000 CAPSULE ORAL DAILY
Qty: 0 | Refills: 0 | Status: DISCONTINUED | OUTPATIENT
Start: 2018-12-16 | End: 2018-12-20

## 2018-12-16 RX ORDER — FERROUS SULFATE 325(65) MG
325 TABLET ORAL DAILY
Qty: 0 | Refills: 0 | Status: DISCONTINUED | OUTPATIENT
Start: 2018-12-16 | End: 2018-12-20

## 2018-12-16 RX ORDER — FUROSEMIDE 40 MG
20 TABLET ORAL DAILY
Qty: 0 | Refills: 0 | Status: DISCONTINUED | OUTPATIENT
Start: 2018-12-17 | End: 2018-12-19

## 2018-12-16 RX ADMIN — SODIUM CHLORIDE 3 MILLILITER(S): 9 INJECTION INTRAMUSCULAR; INTRAVENOUS; SUBCUTANEOUS at 05:44

## 2018-12-16 RX ADMIN — APIXABAN 2.5 MILLIGRAM(S): 2.5 TABLET, FILM COATED ORAL at 05:45

## 2018-12-16 RX ADMIN — SODIUM CHLORIDE 3 MILLILITER(S): 9 INJECTION INTRAMUSCULAR; INTRAVENOUS; SUBCUTANEOUS at 13:08

## 2018-12-16 RX ADMIN — Medication 50 MILLIGRAM(S): at 05:45

## 2018-12-16 RX ADMIN — ATORVASTATIN CALCIUM 10 MILLIGRAM(S): 80 TABLET, FILM COATED ORAL at 21:41

## 2018-12-16 RX ADMIN — APIXABAN 2.5 MILLIGRAM(S): 2.5 TABLET, FILM COATED ORAL at 16:58

## 2018-12-16 RX ADMIN — Medication 81 MILLIGRAM(S): at 16:58

## 2018-12-16 RX ADMIN — SODIUM CHLORIDE 3 MILLILITER(S): 9 INJECTION INTRAMUSCULAR; INTRAVENOUS; SUBCUTANEOUS at 21:41

## 2018-12-16 RX ADMIN — Medication 325 MILLIGRAM(S): at 16:58

## 2018-12-16 RX ADMIN — LISINOPRIL 2.5 MILLIGRAM(S): 2.5 TABLET ORAL at 05:45

## 2018-12-16 RX ADMIN — PANTOPRAZOLE SODIUM 40 MILLIGRAM(S): 20 TABLET, DELAYED RELEASE ORAL at 05:45

## 2018-12-16 RX ADMIN — Medication 2000 UNIT(S): at 16:58

## 2018-12-16 NOTE — PROGRESS NOTE ADULT - ASSESSMENT
_________________________________________________________________________________________  ========>>  M E D I C A L   A T T E N D I N G    F O L L O W  U P  N O T E  <<=========  -----------------------------------------------------------------------------------------------------    - Patient seen and examined by me approximately sixty minutes ago.   - In summary,  GAVIOTA SINGH is a 84y year old woman who originally presented with dizziness   - Patient today overall doing ok, comfortable, eating OK.  overall improved, wants to go home!     ==================>> REVIEW OF SYSTEM <<=================    GEN: no fever, no chills, no pain  RESP: no SOB at rest , no cough, no sputum  CVS: no chest pain, no palpitations, no edema  GI: no abdominal pain, no nausea, no constipation, no diarrhea  : no dysuria, no frequency, no hematuria  Neuro: no headache, no dizziness  Derm : no itching, no rash    ==================>> PHYSICAL EXAM <<=================    GEN: A&O X 3 , NAD , comfortable, cachectic   HEENT: NCAT, PERRL, MMM, hearing intact  Neck: supple , no JVD  CVS: S1S2 , regular   PULM: CTA B/L,  no W/R/R appreciated  ABD.: soft. non tender, non distended,  bowel sounds present  Extrem: intact pulses , no edema   PSYCH : normal mood,  not anxious       ==================>> MEDICATIONS <<====================    apixaban 2.5 milliGRAM(s) Oral every 12 hours  aspirin enteric coated 81 milliGRAM(s) Oral daily  atorvastatin 10 milliGRAM(s) Oral at bedtime  lisinopril 2.5 milliGRAM(s) Oral daily  metoprolol tartrate 50 milliGRAM(s) Oral daily  pantoprazole    Tablet 40 milliGRAM(s) Oral before breakfast  sodium chloride 0.9% lock flush 3 milliLiter(s) IV Push every 8 hours    MEDICATIONS  (PRN):  meclizine 12.5 milliGRAM(s) Oral three times a day PRN Dizziness    ==================>> VITAL SIGNS <<==================    Vital Signs Last 24 Hrs  T(C): 36.4 (12-16-18 @ 12:50)  T(F): 97.5 (12-16-18 @ 12:50), Max: 97.6 (12-15-18 @ 22:07)  HR: 84 (12-16-18 @ 12:50) (84 - 106)  BP: 120/77 (12-16-18 @ 12:50)  RR: 17 (12-16-18 @ 12:50) (17 - 18)  SpO2: 100% (12-16-18 @ 12:50) (99% - 100%)       ==================>> LAB AND IMAGING <<==================                        9.5    5.19  )-----------( 191      ( 16 Dec 2018 07:15 )             31.5        Hemoglobin:   9.5 <<==,  9.5 <<==,  9.4 <<==,  9.9 <<==,  11.1 <<==    139  |  106  |  24<H>  ----------------------------<  102<H>  4.0   |  21<L>  |  1.13    Ca    8.5      16 Dec 2018 07:15  Mg     2.0     12-16         12-15-18 @ 06:15  Iron:     34 ug/dL  Iron %:     TIBC:            12-15-18 @ 06:15  Vit B12:  708 pg/mL  Folate:   13.0 ng/mL       12-15-18 @ 06:15  Vit. D:   10.7 ng/mL  < from: Transthoracic Echocardiogram (12.14.18 @ 20:52) >  CONCLUSIONS:  1. Mitral annular calcification and calcified mitral  leaflets with decreased diastolic opening. Moderate mitral regurgitation.  2. Calcified trileaflet aortic valve with normal opening.  3. Severely dilated left atrium.  LA volume index = 60 cc/m2.  4. Normal left ventricular internal dimensions and wall thicknesses.  5. Moderate global left ventricular systolic dysfunction.  Septal motion is consistent with LBBB.  6. Increased E/e'  is consistent with elevated left ventricular filling pressure.  7. Normal right ventricular size and function.  *** Compared with echocardiogram of 8/10/2018, no  significant changes noted.  < end of copied text >    ___________________________________________________________________________________  ===============>>  A S S E S S M E N T   A N D   P L A N <<===============  ------------------------------------------------------------------------------------------    · Assessment		  85 y/o F with hx of afib on eliquis, prior CVA, HTN, recent cardiac valve repair presents with dizziness this afternoon. admitted for near syncope    Problem/Plan - 1:  ·  Problem: Dizziness, likely vertigo     pt's valvulopathy / MR may be also contributing ( pt was also dehydrated / overdiuresed)  tele  orthostatics positive, improved   held diuretics>> will resume in Am per cardio prior to DC planing home   monitor vitals, labs  meclizine PRN  cardio appreciated     Problem/Plan - 2:  ·  Problem: ETELVINA , likley due to overdiuresis >> resolved  lasix as above  added  ACEI per cardio     Problem/Plan - 3:  ·  Problem: Hypertension,   cont metoprolol.   lisinopril as above    Problem/Plan - 4:  ·  Problem: Atrial fibrillation.  Plan: cont metoprolol  Eliquis     Problem/Plan - 5:  ·  Problem: CAD, HLD, likely chronic systolic HF, stable   cont statin.   Continue Current medications otherwise and monitor.     Problem/Plan - 6:  Problem: anemia  likely of chronic disease   anemia panel as above  monitor   will give PO Iron supplements    supplement vitamin D deficiency    -GI/DVT Prophylaxis.  - DC planing home in AM  --------------------------------------------  Case discussed with pt, cardio   Education given on findings and plan of care  ___________________________  WILLY Connell D.O.  Pager: [phone]

## 2018-12-16 NOTE — PROGRESS NOTE ADULT - SUBJECTIVE AND OBJECTIVE BOX
Cardiovascular Disease Progress Note    Overnight events: No acute events overnight. etelvina resolved. not lightheaded. no cp/sob/pnd/orthopnea   Otherwise review of systems negative    Objective Findings:  T(C): 36.3 (12-16-18 @ 05:42), Max: 36.6 (12-15-18 @ 12:56)  HR: 87 (12-16-18 @ 05:42) (87 - 106)  BP: 135/71 (12-16-18 @ 05:42) (109/81 - 135/71)  RR: 17 (12-16-18 @ 05:42) (16 - 18)  SpO2: 100% (12-16-18 @ 05:42) (97% - 100%)  Wt(kg): --  Daily     Daily       Physical Exam:  Gen: NAD  HEENT: EOMI  CV: RRR, normal S1 + S2, 2/6 isi  Lungs: CTAB  Abd: soft, non-tender  Ext: No edema    Telemetry: rate controlled af    Laboratory Data:                        9.5    5.19  )-----------( 191      ( 16 Dec 2018 07:15 )             31.5     12-16    139  |  106  |  24<H>  ----------------------------<  102<H>  4.0   |  21<L>  |  1.13    Ca    8.5      16 Dec 2018 07:15  Mg     2.0     12-16                Inpatient Medications:  MEDICATIONS  (STANDING):  apixaban 2.5 milliGRAM(s) Oral every 12 hours  aspirin enteric coated 81 milliGRAM(s) Oral daily  atorvastatin 10 milliGRAM(s) Oral at bedtime  lisinopril 2.5 milliGRAM(s) Oral daily  metoprolol tartrate 50 milliGRAM(s) Oral daily  pantoprazole    Tablet 40 milliGRAM(s) Oral before breakfast  sodium chloride 0.9% lock flush 3 milliLiter(s) IV Push every 8 hours      Assessment:  -atrial fibrillation on eliquis  -prior CVA  -CAD s/p stents s/p [facility] 11/18 without any new obstructive lesions  -mod-severe MR s/p KEI [facility] with anatomy reportedly suitable for luke clipping/TMVR  -ETELVINA  -dizziness    Plan of Care:  -ETELVINA likely pre-renal in the setting of overdiuresis --> resolved  -c/w lisinopril 2.5mg po daily  -start lasix 20mg to main euvolemia  -c/w metoprolol for systolic HF  -no need for repeat cardiac work up at this time  -per patient wishes, will attempt medical management of her mitral valve disease for now  -c/w tele monitoring  -c/w asa and statin for CAD  -c/w eliquis for afib and hx of stroke  -anticipate d/c home tomm. please have social work assist with any dispo issues      Over 25 minutes spent on total encounter; more than 50% of the visit was spent counseling and/or coordinating care by the attending physician.      Fernando Masters MD   Cardiovascular Disease  [phone]

## 2018-12-17 ENCOUNTER — TRANSCRIPTION ENCOUNTER (OUTPATIENT)
Age: 83
End: 2018-12-17

## 2018-12-17 LAB
BASOPHILS # BLD AUTO: 0.04 K/UL — SIGNIFICANT CHANGE UP (ref 0–0.2)
BASOPHILS NFR BLD AUTO: 0.7 % — SIGNIFICANT CHANGE UP (ref 0–2)
BUN SERPL-MCNC: 22 MG/DL — SIGNIFICANT CHANGE UP (ref 7–23)
BUN SERPL-MCNC: 22 MG/DL — SIGNIFICANT CHANGE UP (ref 7–23)
CALCIUM SERPL-MCNC: 8.5 MG/DL — SIGNIFICANT CHANGE UP (ref 8.4–10.5)
CALCIUM SERPL-MCNC: 8.5 MG/DL — SIGNIFICANT CHANGE UP (ref 8.4–10.5)
CHLORIDE SERPL-SCNC: 106 MMOL/L — SIGNIFICANT CHANGE UP (ref 98–107)
CHLORIDE SERPL-SCNC: 106 MMOL/L — SIGNIFICANT CHANGE UP (ref 98–107)
CO2 SERPL-SCNC: 22 MMOL/L — SIGNIFICANT CHANGE UP (ref 22–31)
CO2 SERPL-SCNC: 22 MMOL/L — SIGNIFICANT CHANGE UP (ref 22–31)
CREAT SERPL-MCNC: 1.11 MG/DL — SIGNIFICANT CHANGE UP (ref 0.5–1.3)
CREAT SERPL-MCNC: 1.11 MG/DL — SIGNIFICANT CHANGE UP (ref 0.5–1.3)
EOSINOPHIL # BLD AUTO: 0.16 K/UL — SIGNIFICANT CHANGE UP (ref 0–0.5)
EOSINOPHIL NFR BLD AUTO: 2.9 % — SIGNIFICANT CHANGE UP (ref 0–6)
GLUCOSE SERPL-MCNC: 85 MG/DL — SIGNIFICANT CHANGE UP (ref 70–99)
GLUCOSE SERPL-MCNC: 85 MG/DL — SIGNIFICANT CHANGE UP (ref 70–99)
HCT VFR BLD CALC: 31.2 % — LOW (ref 34.5–45)
HCT VFR BLD CALC: 31.2 % — LOW (ref 34.5–45)
HGB BLD-MCNC: 9.4 G/DL — LOW (ref 11.5–15.5)
HGB BLD-MCNC: 9.4 G/DL — LOW (ref 11.5–15.5)
IMM GRANULOCYTES # BLD AUTO: 0.02 # — SIGNIFICANT CHANGE UP
IMM GRANULOCYTES NFR BLD AUTO: 0.4 % — SIGNIFICANT CHANGE UP (ref 0–1.5)
LYMPHOCYTES # BLD AUTO: 1.87 K/UL — SIGNIFICANT CHANGE UP (ref 1–3.3)
LYMPHOCYTES # BLD AUTO: 33.5 % — SIGNIFICANT CHANGE UP (ref 13–44)
MAGNESIUM SERPL-MCNC: 2 MG/DL — SIGNIFICANT CHANGE UP (ref 1.6–2.6)
MCHC RBC-ENTMCNC: 24.8 PG — LOW (ref 27–34)
MCHC RBC-ENTMCNC: 24.8 PG — LOW (ref 27–34)
MCHC RBC-ENTMCNC: 30.1 % — LOW (ref 32–36)
MCHC RBC-ENTMCNC: 30.1 % — LOW (ref 32–36)
MCV RBC AUTO: 82.3 FL — SIGNIFICANT CHANGE UP (ref 80–100)
MCV RBC AUTO: 82.3 FL — SIGNIFICANT CHANGE UP (ref 80–100)
MONOCYTES # BLD AUTO: 0.58 K/UL — SIGNIFICANT CHANGE UP (ref 0–0.9)
MONOCYTES NFR BLD AUTO: 10.4 % — SIGNIFICANT CHANGE UP (ref 2–14)
NEUTROPHILS # BLD AUTO: 2.92 K/UL — SIGNIFICANT CHANGE UP (ref 1.8–7.4)
NEUTROPHILS NFR BLD AUTO: 52.1 % — SIGNIFICANT CHANGE UP (ref 43–77)
NRBC # FLD: 0 — SIGNIFICANT CHANGE UP
NRBC # FLD: 0 — SIGNIFICANT CHANGE UP
PLATELET # BLD AUTO: 159 K/UL — SIGNIFICANT CHANGE UP (ref 150–400)
PLATELET # BLD AUTO: 159 K/UL — SIGNIFICANT CHANGE UP (ref 150–400)
PMV BLD: 11.2 FL — SIGNIFICANT CHANGE UP (ref 7–13)
PMV BLD: 11.2 FL — SIGNIFICANT CHANGE UP (ref 7–13)
POTASSIUM SERPL-MCNC: 4.1 MMOL/L — SIGNIFICANT CHANGE UP (ref 3.5–5.3)
POTASSIUM SERPL-MCNC: 4.1 MMOL/L — SIGNIFICANT CHANGE UP (ref 3.5–5.3)
POTASSIUM SERPL-SCNC: 4.1 MMOL/L — SIGNIFICANT CHANGE UP (ref 3.5–5.3)
POTASSIUM SERPL-SCNC: 4.1 MMOL/L — SIGNIFICANT CHANGE UP (ref 3.5–5.3)
RBC # BLD: 3.79 M/UL — LOW (ref 3.8–5.2)
RBC # BLD: 3.79 M/UL — LOW (ref 3.8–5.2)
RBC # FLD: 20.9 % — HIGH (ref 10.3–14.5)
RBC # FLD: 20.9 % — HIGH (ref 10.3–14.5)
SODIUM SERPL-SCNC: 141 MMOL/L — SIGNIFICANT CHANGE UP (ref 135–145)
SODIUM SERPL-SCNC: 141 MMOL/L — SIGNIFICANT CHANGE UP (ref 135–145)
WBC # BLD: 5.59 K/UL — SIGNIFICANT CHANGE UP (ref 3.8–10.5)
WBC # BLD: 5.59 K/UL — SIGNIFICANT CHANGE UP (ref 3.8–10.5)
WBC # FLD AUTO: 5.59 K/UL — SIGNIFICANT CHANGE UP (ref 3.8–10.5)
WBC # FLD AUTO: 5.59 K/UL — SIGNIFICANT CHANGE UP (ref 3.8–10.5)

## 2018-12-17 RX ORDER — APIXABAN 2.5 MG/1
0 TABLET, FILM COATED ORAL
Qty: 0 | Refills: 0 | COMMUNITY

## 2018-12-17 RX ORDER — LISINOPRIL 2.5 MG/1
1 TABLET ORAL
Qty: 0 | Refills: 0 | COMMUNITY

## 2018-12-17 RX ORDER — MECLIZINE HCL 12.5 MG
1 TABLET ORAL
Qty: 0 | Refills: 0 | COMMUNITY
Start: 2018-12-17

## 2018-12-17 RX ORDER — APIXABAN 2.5 MG/1
1 TABLET, FILM COATED ORAL
Qty: 0 | Refills: 0 | COMMUNITY
Start: 2018-12-17

## 2018-12-17 RX ORDER — PANTOPRAZOLE SODIUM 20 MG/1
1 TABLET, DELAYED RELEASE ORAL
Qty: 0 | Refills: 0 | COMMUNITY

## 2018-12-17 RX ORDER — FERROUS SULFATE 325(65) MG
1 TABLET ORAL
Qty: 30 | Refills: 0 | OUTPATIENT
Start: 2018-12-17 | End: 2019-01-15

## 2018-12-17 RX ORDER — PANTOPRAZOLE SODIUM 20 MG/1
1 TABLET, DELAYED RELEASE ORAL
Qty: 0 | Refills: 0 | COMMUNITY
Start: 2018-12-17

## 2018-12-17 RX ORDER — ASPIRIN/CALCIUM CARB/MAGNESIUM 324 MG
1 TABLET ORAL
Qty: 0 | Refills: 0 | COMMUNITY
Start: 2018-12-17

## 2018-12-17 RX ORDER — LISINOPRIL 2.5 MG/1
1 TABLET ORAL
Qty: 0 | Refills: 0 | COMMUNITY
Start: 2018-12-17

## 2018-12-17 RX ORDER — MECLIZINE HCL 12.5 MG
1 TABLET ORAL
Qty: 90 | Refills: 0 | OUTPATIENT
Start: 2018-12-17 | End: 2019-01-15

## 2018-12-17 RX ORDER — FERROUS SULFATE 325(65) MG
1 TABLET ORAL
Qty: 0 | Refills: 0 | COMMUNITY

## 2018-12-17 RX ORDER — FUROSEMIDE 40 MG
1 TABLET ORAL
Qty: 30 | Refills: 1 | OUTPATIENT
Start: 2018-12-17 | End: 2019-02-14

## 2018-12-17 RX ORDER — METOPROLOL TARTRATE 50 MG
1 TABLET ORAL
Qty: 0 | Refills: 0 | COMMUNITY
Start: 2018-12-17

## 2018-12-17 RX ORDER — ASPIRIN/CALCIUM CARB/MAGNESIUM 324 MG
1 TABLET ORAL
Qty: 30 | Refills: 0 | OUTPATIENT
Start: 2018-12-17 | End: 2019-01-15

## 2018-12-17 RX ORDER — LISINOPRIL 2.5 MG/1
1 TABLET ORAL
Qty: 30 | Refills: 1 | OUTPATIENT
Start: 2018-12-17 | End: 2019-02-14

## 2018-12-17 RX ORDER — FUROSEMIDE 40 MG
1 TABLET ORAL
Qty: 0 | Refills: 0 | COMMUNITY
Start: 2018-12-17

## 2018-12-17 RX ORDER — CHOLECALCIFEROL (VITAMIN D3) 125 MCG
2000 CAPSULE ORAL
Qty: 0 | Refills: 0 | COMMUNITY
Start: 2018-12-17

## 2018-12-17 RX ORDER — ATORVASTATIN CALCIUM 80 MG/1
1 TABLET, FILM COATED ORAL
Qty: 0 | Refills: 0 | COMMUNITY
Start: 2018-12-17

## 2018-12-17 RX ADMIN — SODIUM CHLORIDE 3 MILLILITER(S): 9 INJECTION INTRAMUSCULAR; INTRAVENOUS; SUBCUTANEOUS at 13:17

## 2018-12-17 RX ADMIN — Medication 325 MILLIGRAM(S): at 12:50

## 2018-12-17 RX ADMIN — APIXABAN 2.5 MILLIGRAM(S): 2.5 TABLET, FILM COATED ORAL at 17:09

## 2018-12-17 RX ADMIN — PANTOPRAZOLE SODIUM 40 MILLIGRAM(S): 20 TABLET, DELAYED RELEASE ORAL at 05:36

## 2018-12-17 RX ADMIN — Medication 50 MILLIGRAM(S): at 05:36

## 2018-12-17 RX ADMIN — APIXABAN 2.5 MILLIGRAM(S): 2.5 TABLET, FILM COATED ORAL at 05:36

## 2018-12-17 RX ADMIN — SODIUM CHLORIDE 3 MILLILITER(S): 9 INJECTION INTRAMUSCULAR; INTRAVENOUS; SUBCUTANEOUS at 05:35

## 2018-12-17 RX ADMIN — Medication 2000 UNIT(S): at 12:50

## 2018-12-17 RX ADMIN — ATORVASTATIN CALCIUM 10 MILLIGRAM(S): 80 TABLET, FILM COATED ORAL at 21:17

## 2018-12-17 RX ADMIN — Medication 81 MILLIGRAM(S): at 12:50

## 2018-12-17 RX ADMIN — LISINOPRIL 2.5 MILLIGRAM(S): 2.5 TABLET ORAL at 05:36

## 2018-12-17 RX ADMIN — Medication 20 MILLIGRAM(S): at 05:36

## 2018-12-17 RX ADMIN — SODIUM CHLORIDE 3 MILLILITER(S): 9 INJECTION INTRAMUSCULAR; INTRAVENOUS; SUBCUTANEOUS at 21:16

## 2018-12-17 NOTE — DISCHARGE NOTE ADULT - MEDICATION SUMMARY - MEDICATIONS TO CHANGE
I will SWITCH the dose or number of times a day I take the medications listed below when I get home from the hospital:    furosemide 40 mg oral tablet  -- 1 tab(s) by mouth 2 times a day    lisinopril 20 mg oral tablet  -- 1 tab(s) by mouth once a day

## 2018-12-17 NOTE — DISCHARGE NOTE ADULT - HOSPITAL COURSE
85 y/o F with hx of afib on eliquis, prior CVA, HTN, recent cardiac valve repair presents with dizziness this afternoon. admitted for near syncope    · Dizziness, likely vertigo     pt's valvulopathy / MR may be also contributing ( pt was also dehydrated / overdiuresed)  tele  orthostatics positive, improved   held diuretics>> will resume in Am per cardio prior to DC planing home   monitor vitals, labs  meclizine PRN  cardio appreciated     ·  ETELVINA , likley due to overdiuresis >> resolved  lasix as above  added  ACEI per cardio       · Hypertension,   cont metoprolol.   lisinopril as above      · Atrial fibrillation.  Plan: cont metoprolol  Eliquis       · CAD, HLD, likely chronic systolic HF, stable   cont statin.   Continue Current medications otherwise and monitor.     anemia likely of chronic disease   anemia panel as above  monitor, will give PO Iron supplements    supplement vitamin D deficiency    D/C planning home today            Assessment:  -atrial fibrillation on eliquis  -prior CVA  -CAD s/p stents s/p [facility] 11/18 without any new obstructive lesions  -mod-severe MR s/p KEI [facility] with anatomy reportedly suitable for luke clipping/TMVR  -ETELVINA  -dizziness    Plan of Care:  -ETELVINA likely pre-renal in the setting of overdiuresis --> resolved  -c/w lisinopril 2.5mg po daily  -c/w lasix 20mg to main euvolemia  -c/w metoprolol for systolic HF  -no need for repeat cardiac work up at this time  -per patient wishes, will attempt medical management of her mitral valve disease for now  -c/w tele monitoring  -c/w asa and statin for CAD  -c/w eliquis for afib and hx of stroke  -anticipate d/c home today. please have social work assist with any potential dispo issues 85 y/o F with hx of afib on eliquis, prior CVA, HTN, recent cardiac valve repair presents with dizziness this afternoon. admitted for near syncope    · Dizziness, likely vertigo     pt's valvulopathy / MR may be also contributing ( pt was also dehydrated / overdiuresed)  tele  orthostatics positive, improved   held diuretics>> will resume in Am per cardio prior to DC planing home   monitor vitals, labs  meclizine PRN  cardio appreciated     ·  ETELVINA , likley due to overdiuresis >> resolved  lasix as above  added  ACEI per cardio       · Hypertension,   cont metoprolol.   lisinopril as above      · Atrial fibrillation.  Plan: cont metoprolol  Eliquis       · CAD, HLD, likely chronic systolic HF, stable   cont statin.   Continue Current medications otherwise and monitor.     anemia likely of chronic disease   anemia panel as above  monitor, will give PO Iron supplements    supplement vitamin D deficiency    CAD s/p stents s/p [facility] 11/18 without any new obstructive lesions  -mod-severe MR s/p KEI [facility] with anatomy reportedly suitable for luke clipping/TMVR    -c/w lisinopril 2.5mg po daily  -c/w lasix 20mg to main euvolemia  -c/w metoprolol for systolic HF  -no need for repeat cardiac work up at this time  -per patient wishes, will attempt medical management of her mitral valve disease for now  -c/w tele monitoring  -c/w asa and statin for CAD  -c/w eliquis for afib and hx of stroke    12/17/18 Pt is medically stable for discharge home today as per Dr. Connell with outpatient follow-up with Dr. Masters. 85 y/o F with hx of afib on eliquis, prior CVA, HTN, severe MR, presents with dizziness this afternoon. Patient states she had dizziness when she was changing position. She states she does not feel it when she is resting. The symptoms exacerbates when she changes her position. She recently was admitted about 1 month ago for similar symptoms. She was found to have mitral valve disease. She was transferred to [facility] for valve repair. Patient is not sure what exactly was done for her at [facility]. per cardio, was offered to repair MR but not done yet. Denies fever, chills, cough, falls, LOC, chest pain, abdominal pain, nausea, vomiting, melena, hematochezia, LE edema, dysuria, diarrhea, or constipation.     Cardiology attending: -ETELVINA resolved. c/w lisinopril 2.5mg po daily. c/w lasix 40mg to maintain euvolemia. would change metoprolol to toprol xl 50mg for goal directed medical therapy for systolic HF. no need for repeat cardiac work up at this time. per patient wishes, will attempt medical management of her mitral valve disease for now. can d/c tele. c/w asa and statin for CAD. c/w eliquis for afib and hx of stroke. dispo planning    Plan discussed with attending who cleared for discharge. Patient will follow up outpatient with Dr. Masters. 85 y/o F with hx of afib on eliquis, prior CVA, HTN, severe MR, presented with dizziness.    Summery of hospital course:  Patient was seen and evaluated and followed by multiple specialists throughout the stay and treated medically with improvement.  Patient was otherwise stable and had no other complications.  Patient was discharged to home in stable condition to follow up with primary doctor as outpatient for follow up and further care.

## 2018-12-17 NOTE — PROGRESS NOTE ADULT - ASSESSMENT
M E D I C A L   A T T E N D I N G    F O L L O W    U P   N O T E                                     ------------------------------------------------------------------------------------------------    patient evaluated by me, case discussed with team, chart, medications, and physical exam reviewed, labs / tests  and vitals reviewed by me, as stacey.   Patient is stable for discharge today.   Patient to follow up with  Cardio / PMD.   See discharge document for full note.      ==================>> MEDICATIONS <<====================    apixaban 2.5 milliGRAM(s) Oral every 12 hours  aspirin enteric coated 81 milliGRAM(s) Oral daily  atorvastatin 10 milliGRAM(s) Oral at bedtime  cholecalciferol 2000 Unit(s) Oral daily  ferrous    sulfate 325 milliGRAM(s) Oral daily  furosemide    Tablet 20 milliGRAM(s) Oral daily  lisinopril 2.5 milliGRAM(s) Oral daily  metoprolol tartrate 50 milliGRAM(s) Oral daily  pantoprazole    Tablet 40 milliGRAM(s) Oral before breakfast  sodium chloride 0.9% lock flush 3 milliLiter(s) IV Push every 8 hours    MEDICATIONS  (PRN):  meclizine 12.5 milliGRAM(s) Oral three times a day PRN Dizziness    ==================>> VITAL SIGNS <<==================    T(C): 36.1 (12-17-18 @ 13:02), Max: 36.4 (12-16-18 @ 21:40)  HR: 86 (12-17-18 @ 13:02) (86 - 98)  BP: 117/59 (12-17-18 @ 13:02) (115/78 - 136/86)  RR: 16 (12-17-18 @ 13:02) (16 - 18)  SpO2: 100% (12-17-18 @ 13:02) (100% - 100%)    I&O's Summary    16 Dec 2018 07:01  -  17 Dec 2018 07:00  --------------------------------------------------------  IN: 440 mL / OUT: 0 mL / NET: 440 mL       ==================>> LAB AND IMAGING <<==================                        9.4    5.59  )-----------( 159      ( 17 Dec 2018 06:07 )             31.2        Hemoglobin:   9.4 <<==,  9.5 <<==,  9.5 <<==,  9.4 <<==,  9.9 <<==,  11.1 <<==    141  |  106  |  22  ----------------------------<  85  4.1   |  22  |  1.11    Ca    8.5      17 Dec 2018 06:07  Mg     2.0     12-17     TSH:      2.15   (12-13-18)       ,     3.400   (11-28-18)           Lipid profile:  (12-13-18)     Total: 133     LDL  : 73     HDL  :43     TG   :116     HgA1C: 6.3  (12-13-18)             Creatinine:  1.11  <<==, 1.13  <<==, 1.18  <<==, 1.55  <<==, 1.65  <<==, 1.70  <<==   Sodium:   141  <==, 139  <==, 142  <==, 140  <==, 144  <==, 141  <==      WBC count:   5.59 <<== ,  5.19 <<== ,  5.52 <<== ,  6.46 <<== ,  6.13 <<== ,  8.43 <<==    Hemoglobin:   9.4 <<==,  9.5 <<==,  9.5 <<==,  9.4 <<==,  9.9 <<==,  11.1 <<==   platelets:  159 <==, 191 <==, 183 <==, 178 <==, 197 <==, 231 <==

## 2018-12-17 NOTE — PROGRESS NOTE ADULT - SUBJECTIVE AND OBJECTIVE BOX
Cardiovascular Disease Progress Note    Overnight events: No acute events overnight.  aim for d/c home today. no sob or dizziness. no cp/pnd/orthopnea/palps  Otherwise review of systems negative    Objective Findings:  T(C): 36.3 (18 @ 05:34), Max: 36.4 (18 @ 12:50)  HR: 98 (18 @ 05:34) (84 - 98)  BP: 136/86 (18 @ 05:34) (115/78 - 136/86)  RR: 18 (18 @ 05:34) (17 - 18)  SpO2: 100% (18 @ 05:34) (100% - 100%)  Wt(kg): --  Daily     Daily Weight in k.5 (17 Dec 2018 05:34)      Physical Exam:  Gen: NAD  HEENT: EOMI  CV: RRR, normal S1 + S2, no m/r/g  Lungs: CTAB  Abd: soft, non-tender  Ext: No edema    Telemetry: rate controlled af    Laboratory Data:                        9.5    5.19  )-----------( 191      ( 16 Dec 2018 07:15 )             31.5     12-16    139  |  106  |  24<H>  ----------------------------<  102<H>  4.0   |  21<L>  |  1.13    Ca    8.5      16 Dec 2018 07:15  Mg     2.0     12-16          Inpatient Medications:  MEDICATIONS  (STANDING):  apixaban 2.5 milliGRAM(s) Oral every 12 hours  aspirin enteric coated 81 milliGRAM(s) Oral daily  atorvastatin 10 milliGRAM(s) Oral at bedtime  cholecalciferol 2000 Unit(s) Oral daily  ferrous    sulfate 325 milliGRAM(s) Oral daily  furosemide    Tablet 20 milliGRAM(s) Oral daily  lisinopril 2.5 milliGRAM(s) Oral daily  metoprolol tartrate 50 milliGRAM(s) Oral daily  pantoprazole    Tablet 40 milliGRAM(s) Oral before breakfast  sodium chloride 0.9% lock flush 3 milliLiter(s) IV Push every 8 hours      Assessment:  -atrial fibrillation on eliquis  -prior CVA  -CAD s/p stents s/p [facility]  without any new obstructive lesions  -mod-severe MR s/p [facility] with anatomy reportedly suitable for luke clipping/TMVR  -ETELVINA  -dizziness    Plan of Care:  -ETELVINA likely pre-renal in the setting of overdiuresis --> resolved  -c/w lisinopril 2.5mg po daily  -c/w lasix 20mg to main euvolemia  -c/w metoprolol for systolic HF  -no need for repeat cardiac work up at this time  -per patient wishes, will attempt medical management of her mitral valve disease for now  -c/w tele monitoring  -c/w asa and statin for CAD  -c/w eliquis for afib and hx of stroke  -anticipate d/c home today. please have social work assist with any potential dispo issues      Over 25 minutes spent on total encounter; more than 50% of the visit was spent counseling and/or coordinating care by the attending physician.      Fernando Masters MD   Cardiovascular Disease  [phone]

## 2018-12-17 NOTE — DISCHARGE NOTE ADULT - MEDICATION SUMMARY - MEDICATIONS TO STOP TAKING
I will STOP taking the medications listed below when I get home from the hospital:  None I will STOP taking the medications listed below when I get home from the hospital:    metoprolol tartrate 50 mg oral tablet  -- 1 tab(s) by mouth once a day

## 2018-12-17 NOTE — DISCHARGE NOTE ADULT - OTHER SIGNIFICANT FINDINGS
(PMH) Hypertension, unspecified type  (PMH) Atrial fibrillation  (PMH) CHF (congestive heart failure)  (PMH) Atrial fibrillation  (PMH) s/p Angioplasty with Stent  (PMH) Coronary Artery Disease  (PMH) Hyperlipemia  (PMH) Hypertension  (PSH) No significant past surgical history  (PSH) No significant past surgical history CT Head: Stable exam. No mass effect, hemorrhage or evidence of acute intracranial pathology.    CXR: The cardiac silhouette is normal in size. There is no pleural effusion. There is no pneumothorax. No focal consolidation identified. There are degenerative changes of the visualized thoracic spine. CT Head: Stable exam. No mass effect, hemorrhage or evidence of acute intracranial pathology.    CXR: The cardiac silhouette is normal in size. There is no pleural effusion. There is no pneumothorax. No focal consolidation identified. There are degenerative changes of the visualized thoracic spine.    EKG: Afib with RVR at 101 BPM, TWI in AVL

## 2018-12-17 NOTE — DISCHARGE NOTE ADULT - PLAN OF CARE
Continue to monitor. likely vertigo  pt's valvulopathy / MR may be also contributing ( pt was also dehydrated / overdiuresed)  tele, orthostatics positive, improved   held diuretics>> will resume in Am per cardio prior to DC planning home   monitor vitals, labs  meclizine PRN  cardio appreciated Rate control. Plan: cont metoprolol, Eliquis. likely due to overdiuresis >> resolved  lasix as above  added  ACEI per cardio To maintain a normal blood pressure to prevent heart attack, stroke and renal failure. cont metoprolol.   lisinopril as above likely of chronic disease  anemia panel as above  monitor, will give PO Iron supplements likely vertigo  pt's valvulopathy / MR may be also contributing ( pt was also dehydrated / overdiuresed)  tele, orthostatics positive, improved   monitor vitals, labs  meclizine PRN Plan: continue metoprolol for rate control and Eliquis for stroke prevention. likely due to overdiuresis >> resolved  Continue lasix  ACEI was added per cardiology recommendations Maintain adequate control of your blood pressure. Continue low sodium diet. Follow up outpatient with your primary care provider as needed. Continue taking iron supplements

## 2018-12-17 NOTE — DISCHARGE NOTE ADULT - CARE PROVIDER_API CALL
Gino Masters), Cardiovascular Disease; Internal Medicine  [id number] [address] [place] [address]  Phone: [phone]  Fax: [phone]

## 2018-12-17 NOTE — DISCHARGE NOTE ADULT - PATIENT PORTAL LINK FT
You can access the [facility] Patient Portal, offered by [facility], by registering with the following website: [email or web address]

## 2018-12-17 NOTE — DISCHARGE NOTE ADULT - CARE PLAN
Principal Discharge DX:	Dizziness  Goal:	Continue to monitor.  Assessment and plan of treatment:	likely vertigo  pt's valvulopathy / MR may be also contributing ( pt was also dehydrated / overdiuresed)  tele, orthostatics positive, improved   held diuretics>> will resume in Am per cardio prior to DC planning home   monitor vitals, labs  meclizine PRN  cardio appreciated  Secondary Diagnosis:	Atrial fibrillation  Goal:	Rate control.  Assessment and plan of treatment:	Plan: cont metoprolol, Eliquis.  Secondary Diagnosis:	ETELVINA (acute kidney injury)  Goal:	Continue to monitor.  Assessment and plan of treatment:	likely due to overdiuresis >> resolved  lasix as above  added  ACEI per cardio  Secondary Diagnosis:	Hypertension  Goal:	To maintain a normal blood pressure to prevent heart attack, stroke and renal failure.  Assessment and plan of treatment:	cont metoprolol.   lisinopril as above  Secondary Diagnosis:	Anemia  Goal:	Continue to monitor.  Assessment and plan of treatment:	likely of chronic disease  anemia panel as above  monitor, will give PO Iron supplements Principal Discharge DX:	Dizziness  Goal:	Continue to monitor.  Assessment and plan of treatment:	likely vertigo  pt's valvulopathy / MR may be also contributing ( pt was also dehydrated / overdiuresed)  tele, orthostatics positive, improved   monitor vitals, labs  meclizine PRN  Secondary Diagnosis:	Atrial fibrillation  Goal:	Rate control.  Assessment and plan of treatment:	Plan: continue metoprolol for rate control and Eliquis for stroke prevention.  Secondary Diagnosis:	ETELVINA (acute kidney injury)  Assessment and plan of treatment:	likely due to overdiuresis >> resolved  Continue lasix  ACEI was added per cardiology recommendations  Secondary Diagnosis:	Hypertension  Assessment and plan of treatment:	Maintain adequate control of your blood pressure. Continue low sodium diet. Follow up outpatient with your primary care provider as needed.  Secondary Diagnosis:	Anemia  Assessment and plan of treatment:	Continue taking iron supplements

## 2018-12-18 RX ADMIN — LISINOPRIL 2.5 MILLIGRAM(S): 2.5 TABLET ORAL at 05:07

## 2018-12-18 RX ADMIN — APIXABAN 2.5 MILLIGRAM(S): 2.5 TABLET, FILM COATED ORAL at 05:06

## 2018-12-18 RX ADMIN — Medication 81 MILLIGRAM(S): at 16:25

## 2018-12-18 RX ADMIN — APIXABAN 2.5 MILLIGRAM(S): 2.5 TABLET, FILM COATED ORAL at 16:25

## 2018-12-18 RX ADMIN — SODIUM CHLORIDE 3 MILLILITER(S): 9 INJECTION INTRAMUSCULAR; INTRAVENOUS; SUBCUTANEOUS at 16:12

## 2018-12-18 RX ADMIN — Medication 2000 UNIT(S): at 16:25

## 2018-12-18 RX ADMIN — Medication 50 MILLIGRAM(S): at 05:06

## 2018-12-18 RX ADMIN — Medication 325 MILLIGRAM(S): at 16:25

## 2018-12-18 RX ADMIN — ATORVASTATIN CALCIUM 10 MILLIGRAM(S): 80 TABLET, FILM COATED ORAL at 21:59

## 2018-12-18 RX ADMIN — Medication 20 MILLIGRAM(S): at 05:06

## 2018-12-18 RX ADMIN — PANTOPRAZOLE SODIUM 40 MILLIGRAM(S): 20 TABLET, DELAYED RELEASE ORAL at 05:06

## 2018-12-18 NOTE — PROGRESS NOTE ADULT - ASSESSMENT
_________________________________________________________________________________________  ========>>  M E D I C A L   A T T E N D I N G    F O L L O W  U P  N O T E  <<=========  -----------------------------------------------------------------------------------------------------    - Patient seen and examined by me earlier today.   - In summary,  GAVIOTA SINGH is a 84y year old woman who originally presented with dizziness   - Patient today overall doing ok, comfortable, eating OK.  overall improved.     discharge was not done due to concerns by CM/SW that discharge home is unsage.. in discussions with pt's sons to send to assisted living directly from the hospital     ==================>> REVIEW OF SYSTEM <<=================    GEN: no fever, no chills, no pain  RESP: no SOB at rest , no cough, no sputum  CVS: no chest pain, no palpitations, no edema  GI: no abdominal pain, no nausea, no constipation, no diarrhea  : no dysuria, no frequency, no hematuria  Neuro: no headache, no dizziness  Derm : no itching, no rash    ==================>> PHYSICAL EXAM <<=================    GEN: A&O X 3 , NAD , comfortable, cachectic   HEENT: NCAT, PERRL, MMM, hearing intact  Neck: supple , no JVD  CVS: S1S2 , regular   PULM: CTA B/L,  no W/R/R appreciated  ABD.: soft. non tender, non distended,  bowel sounds present  Extrem: intact pulses , no edema   PSYCH : normal mood,  not anxious       ==================>> MEDICATIONS <<====================    apixaban 2.5 milliGRAM(s) Oral every 12 hours  aspirin enteric coated 81 milliGRAM(s) Oral daily  atorvastatin 10 milliGRAM(s) Oral at bedtime  cholecalciferol 2000 Unit(s) Oral daily  ferrous    sulfate 325 milliGRAM(s) Oral daily  furosemide    Tablet 20 milliGRAM(s) Oral daily  lisinopril 2.5 milliGRAM(s) Oral daily  metoprolol tartrate 50 milliGRAM(s) Oral daily  pantoprazole    Tablet 40 milliGRAM(s) Oral before breakfast  sodium chloride 0.9% lock flush 3 milliLiter(s) IV Push every 8 hours    MEDICATIONS  (PRN):  meclizine 12.5 milliGRAM(s) Oral three times a day PRN Dizziness    ==================>> VITAL SIGNS <<==================    Vital Signs Last 24 Hrs  T(C): 36.4 (12-18-18 @ 12:30)  T(F): 97.6 (12-18-18 @ 12:30), Max: 98.7 (12-17-18 @ 21:18)  HR: 96 (12-18-18 @ 12:30) (80 - 96)  BP: 104/57 (12-18-18 @ 12:30)  BP(mean): --  RR: 18 (12-18-18 @ 12:30) (15 - 18)  SpO2: 100% (12-18-18 @ 12:30) (98% - 100%)    CAPILLARY BLOOD GLUCOSE         ==================>> LAB AND IMAGING <<==================                        9.4    5.59  )-----------( 159      ( 17 Dec 2018 06:07 )             31.2        12-17    141  |  106  |  22  ----------------------------<  85  4.1   |  22  |  1.11    Ca    8.5      17 Dec 2018 06:07  Mg     2.0     12-17         12-15-18 @ 06:15  Iron:     34 ug/dL  Iron %:     TIBC:            12-15-18 @ 06:15  Vit B12:  708 pg/mL  Folate:   13.0 ng/mL       12-15-18 @ 06:15  Vit. D:   10.7 ng/mL  < from: Transthoracic Echocardiogram (12.14.18 @ 20:52) >  CONCLUSIONS:  1. Mitral annular calcification and calcified mitral  leaflets with decreased diastolic opening. Moderate mitral regurgitation.  2. Calcified trileaflet aortic valve with normal opening.  3. Severely dilated left atrium.  LA volume index = 60 cc/m2.  4. Normal left ventricular internal dimensions and wall thicknesses.  5. Moderate global left ventricular systolic dysfunction.  Septal motion is consistent with LBBB.  6. Increased E/e'  is consistent with elevated left ventricular filling pressure.  7. Normal right ventricular size and function.  *** Compared with echocardiogram of 8/10/2018, no  significant changes noted.  < end of copied text >    ___________________________________________________________________________________  ===============>>  A S S E S S M E N T   A N D   P L A N <<===============  ------------------------------------------------------------------------------------------    · Assessment		  83 y/o F with hx of afib on eliquis, prior CVA, HTN, recent cardiac valve repair presents with dizziness this afternoon. admitted for near syncope    Problem/Plan - 1:  ·  Problem: Dizziness, likely vertigo     pt's valvulopathy / MR may be also contributing ( pt was also dehydrated / over diuresed  tele  monitor vitals, labs  meclizine PRN  cardio appreciated   Continue Current medications otherwise  and monitor.     Problem/Plan - 2:  ·  Problem: ETELVINA , likley due to overdiuresis >> resolved  lasix as above  added  ACEI per cardio     Problem/Plan - 3:  ·  Problem: Hypertension,   cont metoprolol.   lisinopril as above    Problem/Plan - 4:  ·  Problem: Atrial fibrillation.  Plan: cont metoprolol  Eliquis     Problem/Plan - 5:  ·  Problem: CAD, HLD, likely chronic systolic HF, stable   cont statin.   Continue Current medications otherwise and monitor.     Problem/Plan - 6:  Problem: anemia  likely of chronic disease   anemia panel as above  monitor   will give PO Iron supplements    supplement vitamin D deficiency    -GI/DVT Prophylaxis.  - DC planing to assisted living as above   --------------------------------------------  Case discussed with pt, cardio   Education given on findings and plan of care  ___________________________  HLeila Connell D.O.  Pager: [phone] _________________________________________________________________________________________  ========>>  M E D I C A L   A T T E N D I N G    F O L L O W  U P  N O T E  <<=========  -----------------------------------------------------------------------------------------------------    - Patient seen and examined by me earlier today.   - In summary,  GAVIOTA SINGH is a 84y year old woman who originally presented with dizziness   - Patient today overall doing ok, comfortable, eating OK.  overall improved.     discharge was not done due to concerns by CM/SW that discharge home is unsage.. in discussions with pt's sons to send to assisted living directly from the hospital     ==================>> REVIEW OF SYSTEM <<=================    GEN: no fever, no chills, no pain  RESP: no SOB at rest , no cough, no sputum  CVS: no chest pain, no palpitations, no edema  GI: no abdominal pain, no nausea, no constipation, no diarrhea  : no dysuria, no frequency, no hematuria  Neuro: no headache, no dizziness  Derm : no itching, no rash    ==================>> PHYSICAL EXAM <<=================    GEN: A&O X 3 , NAD , comfortable, cachectic   HEENT: NCAT, PERRL, MMM, hearing intact  Neck: supple , no JVD  CVS: S1S2 , regular   PULM: CTA B/L,  no W/R/R appreciated  ABD.: soft. non tender, non distended,  bowel sounds present  Extrem: intact pulses , no edema   PSYCH : normal mood,  not anxious       ==================>> MEDICATIONS <<====================    apixaban 2.5 milliGRAM(s) Oral every 12 hours  aspirin enteric coated 81 milliGRAM(s) Oral daily  atorvastatin 10 milliGRAM(s) Oral at bedtime  cholecalciferol 2000 Unit(s) Oral daily  ferrous    sulfate 325 milliGRAM(s) Oral daily  furosemide    Tablet 20 milliGRAM(s) Oral daily  lisinopril 2.5 milliGRAM(s) Oral daily  metoprolol tartrate 50 milliGRAM(s) Oral daily  pantoprazole    Tablet 40 milliGRAM(s) Oral before breakfast  sodium chloride 0.9% lock flush 3 milliLiter(s) IV Push every 8 hours    MEDICATIONS  (PRN):  meclizine 12.5 milliGRAM(s) Oral three times a day PRN Dizziness    ==================>> VITAL SIGNS <<==================    Vital Signs Last 24 Hrs  T(C): 36.4 (12-18-18 @ 12:30)  T(F): 97.6 (12-18-18 @ 12:30), Max: 98.7 (12-17-18 @ 21:18)  HR: 96 (12-18-18 @ 12:30) (80 - 96)  BP: 104/57 (12-18-18 @ 12:30)  BP(mean): --  RR: 18 (12-18-18 @ 12:30) (15 - 18)  SpO2: 100% (12-18-18 @ 12:30) (98% - 100%)    CAPILLARY BLOOD GLUCOSE         ==================>> LAB AND IMAGING <<==================                        9.4    5.59  )-----------( 159      ( 17 Dec 2018 06:07 )             31.2        12-17    141  |  106  |  22  ----------------------------<  85  4.1   |  22  |  1.11    Ca    8.5      17 Dec 2018 06:07  Mg     2.0     12-17         12-15-18 @ 06:15  Iron:     34 ug/dL  Iron %:     TIBC:            12-15-18 @ 06:15  Vit B12:  708 pg/mL  Folate:   13.0 ng/mL       12-15-18 @ 06:15  Vit. D:   10.7 ng/mL  < from: Transthoracic Echocardiogram (12.14.18 @ 20:52) >  CONCLUSIONS:  1. Mitral annular calcification and calcified mitral  leaflets with decreased diastolic opening. Moderate mitral regurgitation.  2. Calcified trileaflet aortic valve with normal opening.  3. Severely dilated left atrium.  LA volume index = 60 cc/m2.  4. Normal left ventricular internal dimensions and wall thicknesses.  5. Moderate global left ventricular systolic dysfunction.  Septal motion is consistent with LBBB.  6. Increased E/e'  is consistent with elevated left ventricular filling pressure.  7. Normal right ventricular size and function.  *** Compared with echocardiogram of 8/10/2018, no  significant changes noted.  < end of copied text >    ___________________________________________________________________________________  ===============>>  A S S E S S M E N T   A N D   P L A N <<===============  ------------------------------------------------------------------------------------------    · Assessment		  83 y/o F with hx of afib on eliquis, prior CVA, HTN, recent cardiac valve repair presents with dizziness this afternoon. admitted for near syncope    Problem/Plan - 1:  ·  Problem: Dizziness, likely vertigo     pt's valvulopathy / MR may be also contributing ( pt was also dehydrated / over diuresed  tele  monitor vitals, labs  meclizine PRN  cardio appreciated   Continue Current medications otherwise  and monitor.     Problem/Plan - 2:  ·  Problem: ETELVINA , likley due to overdiuresis >> resolved  lasix as above  added  ACEI per cardio     Problem/Plan - 3:  ·  Problem: Hypertension,   cont metoprolol.   lisinopril as above    Problem/Plan - 4:  ·  Problem: Atrial fibrillation.  Plan: cont metoprolol  Eliquis     Problem/Plan - 5:  ·  Problem: CAD, HLD, likely chronic systolic HF, stable   cont statin.   Continue Current medications otherwise and monitor.     Problem/Plan - 6:  Problem: anemia  likely of chronic disease   anemia panel as above  monitor   will give PO Iron supplements    supplement vitamin D deficiency    -GI/DVT Prophylaxis.  - DC planing to assisted living as above   --------------------------------------------  Case discussed with pt, cardio , justin, CM and SW  Education given on findings and plan of care  ___________________________  H. JOSE LUIS Connell.  Pager: [phone]

## 2018-12-18 NOTE — PROGRESS NOTE ADULT - SUBJECTIVE AND OBJECTIVE BOX
Cardiovascular Disease Progress Note    Overnight events: No acute events overnight.  awaiting dispo. no cp/sob/pnd/orthopnea/palps.   Otherwise review of systems negative    Objective Findings:  T(C): 36.6 (18 @ 05:00), Max: 37.1 (18 @ 21:18)  HR: 83 (18 @ 05:00) (80 - 86)  BP: 129/88 (18 @ 05:00) (117/59 - 129/88)  RR: 15 (18 @ 05:00) (15 - 16)  SpO2: 98% (18 @ 05:00) (98% - 100%)  Wt(kg): --  Daily     Daily Weight in k (18 Dec 2018 07:10)      Physical Exam:  Gen: NAD  HEENT: EOMI  CV: RRR, normal S1 + S2, no m/r/g  Lungs: CTAB  Abd: soft, non-tender  Ext: No edema    Telemetry: af rate controlled    Laboratory Data:                        9.4    5.59  )-----------( 159      ( 17 Dec 2018 06:07 )             31.2     12-    141  |  106  |  22  ----------------------------<  85  4.1   |  22  |  1.11    Ca    8.5      17 Dec 2018 06:07  Mg     2.0     12-17                Inpatient Medications:  MEDICATIONS  (STANDING):  apixaban 2.5 milliGRAM(s) Oral every 12 hours  aspirin enteric coated 81 milliGRAM(s) Oral daily  atorvastatin 10 milliGRAM(s) Oral at bedtime  cholecalciferol 2000 Unit(s) Oral daily  ferrous    sulfate 325 milliGRAM(s) Oral daily  furosemide    Tablet 20 milliGRAM(s) Oral daily  lisinopril 2.5 milliGRAM(s) Oral daily  metoprolol tartrate 50 milliGRAM(s) Oral daily  pantoprazole    Tablet 40 milliGRAM(s) Oral before breakfast  sodium chloride 0.9% lock flush 3 milliLiter(s) IV Push every 8 hours      Assessment:  -atrial fibrillation on eliquis  -prior CVA  -CAD s/p stents s/p [facility]  without any new obstructive lesions  -mod-severe MR s/p E[facility] with anatomy reportedly suitable for luke clipping/TMVR  -ETELVINA  -dizziness    Plan of Care:  -ETELVINA likely pre-renal in the setting of overdiuresis --> resolved  -c/w lisinopril 2.5mg po daily  -c/w lasix 20mg to maintain euvolemia  -would change metoprolol to toprol xl 50mg for goal directed medical therapy for systolic HF  -no need for repeat cardiac work up at this time  -per patient wishes, will attempt medical management of her mitral valve disease for now  -c/w tele monitoring  -c/w asa and statin for CAD  -c/w eliquis for afib and hx of stroke  -dispo planning        Over 25 minutes spent on total encounter; more than 50% of the visit was spent counseling and/or coordinating care by the attending physician.      Fernando Masters MD   Cardiovascular Disease  [phone]

## 2018-12-19 RX ORDER — APIXABAN 2.5 MG/1
1 TABLET, FILM COATED ORAL
Qty: 60 | Refills: 0 | OUTPATIENT
Start: 2018-12-19 | End: 2019-01-17

## 2018-12-19 RX ORDER — FUROSEMIDE 40 MG
40 TABLET ORAL DAILY
Qty: 0 | Refills: 0 | Status: DISCONTINUED | OUTPATIENT
Start: 2018-12-19 | End: 2018-12-20

## 2018-12-19 RX ORDER — FUROSEMIDE 40 MG
2 TABLET ORAL
Qty: 60 | Refills: 1 | OUTPATIENT
Start: 2018-12-19 | End: 2019-02-16

## 2018-12-19 RX ORDER — ASPIRIN/CALCIUM CARB/MAGNESIUM 324 MG
1 TABLET ORAL
Qty: 30 | Refills: 0 | OUTPATIENT
Start: 2018-12-19 | End: 2019-01-17

## 2018-12-19 RX ORDER — ATORVASTATIN CALCIUM 80 MG/1
1 TABLET, FILM COATED ORAL
Qty: 30 | Refills: 0 | OUTPATIENT
Start: 2018-12-19 | End: 2019-01-17

## 2018-12-19 RX ORDER — METOPROLOL TARTRATE 50 MG
1 TABLET ORAL
Qty: 30 | Refills: 0 | OUTPATIENT
Start: 2018-12-19 | End: 2019-01-17

## 2018-12-19 RX ORDER — LISINOPRIL 2.5 MG/1
1 TABLET ORAL
Qty: 30 | Refills: 1 | OUTPATIENT
Start: 2018-12-19 | End: 2019-02-16

## 2018-12-19 RX ORDER — CHOLECALCIFEROL (VITAMIN D3) 125 MCG
2000 CAPSULE ORAL
Qty: 30 | Refills: 0 | OUTPATIENT
Start: 2018-12-19 | End: 2019-01-17

## 2018-12-19 RX ORDER — FERROUS SULFATE 325(65) MG
1 TABLET ORAL
Qty: 30 | Refills: 0 | OUTPATIENT
Start: 2018-12-19 | End: 2019-01-17

## 2018-12-19 RX ORDER — MECLIZINE HCL 12.5 MG
1 TABLET ORAL
Qty: 90 | Refills: 0 | OUTPATIENT
Start: 2018-12-19 | End: 2019-01-17

## 2018-12-19 RX ORDER — PANTOPRAZOLE SODIUM 20 MG/1
1 TABLET, DELAYED RELEASE ORAL
Qty: 30 | Refills: 0 | OUTPATIENT
Start: 2018-12-19 | End: 2019-01-17

## 2018-12-19 RX ADMIN — APIXABAN 2.5 MILLIGRAM(S): 2.5 TABLET, FILM COATED ORAL at 17:29

## 2018-12-19 RX ADMIN — Medication 50 MILLIGRAM(S): at 05:16

## 2018-12-19 RX ADMIN — ATORVASTATIN CALCIUM 10 MILLIGRAM(S): 80 TABLET, FILM COATED ORAL at 21:29

## 2018-12-19 RX ADMIN — SODIUM CHLORIDE 3 MILLILITER(S): 9 INJECTION INTRAMUSCULAR; INTRAVENOUS; SUBCUTANEOUS at 21:04

## 2018-12-19 RX ADMIN — Medication 2000 UNIT(S): at 17:29

## 2018-12-19 RX ADMIN — SODIUM CHLORIDE 3 MILLILITER(S): 9 INJECTION INTRAMUSCULAR; INTRAVENOUS; SUBCUTANEOUS at 13:00

## 2018-12-19 RX ADMIN — PANTOPRAZOLE SODIUM 40 MILLIGRAM(S): 20 TABLET, DELAYED RELEASE ORAL at 05:17

## 2018-12-19 RX ADMIN — Medication 325 MILLIGRAM(S): at 17:29

## 2018-12-19 RX ADMIN — Medication 81 MILLIGRAM(S): at 17:29

## 2018-12-19 RX ADMIN — LISINOPRIL 2.5 MILLIGRAM(S): 2.5 TABLET ORAL at 05:17

## 2018-12-19 RX ADMIN — APIXABAN 2.5 MILLIGRAM(S): 2.5 TABLET, FILM COATED ORAL at 06:04

## 2018-12-19 RX ADMIN — Medication 40 MILLIGRAM(S): at 17:29

## 2018-12-19 RX ADMIN — Medication 20 MILLIGRAM(S): at 05:16

## 2018-12-19 NOTE — PROGRESS NOTE ADULT - SUBJECTIVE AND OBJECTIVE BOX
Cardiovascular Disease Progress Note    Overnight events: No acute events overnight.  awaiting d/c. no cp/sob/pnd/orthopnea  Otherwise review of systems negative    Objective Findings:  T(C): 36.5 (18 @ 04:57), Max: 37.1 (18 @ 21:28)  HR: 89 (18 @ 04:57) (89 - 98)  BP: 128/66 (18 @ 04:57) (104/57 - 128/66)  RR: 15 (18 @ 04:57) (15 - 18)  SpO2: 97% (18 @ 04:57) (97% - 100%)  Wt(kg): --  Daily     Daily Weight in k.6 (19 Dec 2018 06:39)      Physical Exam:  Gen: NAD  HEENT: EOMI  CV: RRR, normal S1 + S2, no m/r/g + JVD  Lungs: CTAB  Abd: soft, non-tender  Ext: No edema    Telemetry: af rate controlled    Laboratory Data:                    Inpatient Medications:  MEDICATIONS  (STANDING):  apixaban 2.5 milliGRAM(s) Oral every 12 hours  aspirin enteric coated 81 milliGRAM(s) Oral daily  atorvastatin 10 milliGRAM(s) Oral at bedtime  cholecalciferol 2000 Unit(s) Oral daily  ferrous    sulfate 325 milliGRAM(s) Oral daily  furosemide    Tablet 40 milliGRAM(s) Oral daily  lisinopril 2.5 milliGRAM(s) Oral daily  metoprolol tartrate 50 milliGRAM(s) Oral daily  pantoprazole    Tablet 40 milliGRAM(s) Oral before breakfast  sodium chloride 0.9% lock flush 3 milliLiter(s) IV Push every 8 hours      Assessment:  -atrial fibrillation on eliquis  -prior CVA  -CAD s/p stents s/p [facility]  without any new obstructive lesions  -mod-severe MR s/p [facility] with anatomy reportedly suitable for luke clipping/TMVR  -ETELVINA  -dizziness    Plan of Care:  -ETELVINA likely pre-renal in the setting of overdiuresis --> resolved  -c/w lisinopril 2.5mg po daily  -lasix increased to 40mg to maintain euvolemia  -would change metoprolol to toprol xl 50mg for goal directed medical therapy for systolic HF  -no need for repeat cardiac work up at this time  -per patient wishes, will attempt medical management of her mitral valve disease for now  -c/w tele monitoring  -c/w asa and statin for CAD  -c/w eliquis for afib and hx of stroke  -dispo planning    Over 25 minutes spent on total encounter; more than 50% of the visit was spent counseling and/or coordinating care by the attending physician.      Fernando Masters MD   Cardiovascular Disease  [phone]

## 2018-12-19 NOTE — PROGRESS NOTE ADULT - ASSESSMENT
_________________________________________________________________________________________  ========>>  M E D I C A L   A T T E N D I N G    F O L L O W  U P  N O T E  <<=========  -----------------------------------------------------------------------------------------------------    - Patient seen and examined by me earlier today.   - In summary,  GAVIOTA SINGH is a 84y year old woman who originally presented with dizziness   - Patient today overall doing ok, comfortable, eating OK.  overall improved.     discharge was not done due to concerns by CM/SW that discharge home is unsafe.. in discussions with pt's sons to send to assisted living directly from the hospital     ==================>> REVIEW OF SYSTEM <<=================    GEN: no fever, no chills, no pain  RESP: no SOB at rest , no cough, no sputum  CVS: no chest pain, no palpitations, no edema  GI: no abdominal pain, no nausea, no constipation, no diarrhea  : no dysuria, no frequency, no hematuria  Neuro: no headache, no dizziness  Derm : no itching, no rash    ==================>> PHYSICAL EXAM <<=================    GEN: A&O X 3 , NAD , comfortable, cachectic   HEENT: NCAT, PERRL, MMM, hearing intact  Neck: supple , no JVD  CVS: S1S2 , regular   PULM: CTA B/L,  no W/R/R appreciated  ABD.: soft. non tender, non distended,  bowel sounds present  Extrem: intact pulses , no edema   PSYCH : normal mood,  not anxious        ==================>> MEDICATIONS <<====================    apixaban 2.5 milliGRAM(s) Oral every 12 hours  aspirin enteric coated 81 milliGRAM(s) Oral daily  atorvastatin 10 milliGRAM(s) Oral at bedtime  cholecalciferol 2000 Unit(s) Oral daily  ferrous    sulfate 325 milliGRAM(s) Oral daily  furosemide    Tablet 40 milliGRAM(s) Oral daily  lisinopril 2.5 milliGRAM(s) Oral daily  metoprolol tartrate 50 milliGRAM(s) Oral daily  pantoprazole    Tablet 40 milliGRAM(s) Oral before breakfast  sodium chloride 0.9% lock flush 3 milliLiter(s) IV Push every 8 hours    MEDICATIONS  (PRN):  meclizine 12.5 milliGRAM(s) Oral three times a day PRN Dizziness    ==================>> VITAL SIGNS <<==================    Vital Signs Last 24 Hrs  T(C): 36.4 (12-19-18 @ 12:37)  T(F): 97.6 (12-19-18 @ 12:37), Max: 98.7 (12-18-18 @ 21:28)  HR: 76 (12-19-18 @ 12:37) (76 - 98)  BP: 103/52 (12-19-18 @ 12:37)  RR: 17 (12-19-18 @ 12:37) (15 - 17)  SpO2: 100% (12-19-18 @ 12:37) (97% - 100%)       ==================>> LAB AND IMAGING <<==================       no labs today         12-15-18 @ 06:15  Iron:     34 ug/dL  Iron %:     TIBC:            12-15-18 @ 06:15  Vit B12:  708 pg/mL  Folate:   13.0 ng/mL       12-15-18 @ 06:15  Vit. D:   10.7 ng/mL  < from: Transthoracic Echocardiogram (12.14.18 @ 20:52) >  CONCLUSIONS:  1. Mitral annular calcification and calcified mitral  leaflets with decreased diastolic opening. Moderate mitral regurgitation.  2. Calcified trileaflet aortic valve with normal opening.  3. Severely dilated left atrium.  LA volume index = 60 cc/m2.  4. Normal left ventricular internal dimensions and wall thicknesses.  5. Moderate global left ventricular systolic dysfunction.  Septal motion is consistent with LBBB.  6. Increased E/e'  is consistent with elevated left ventricular filling pressure.  7. Normal right ventricular size and function.  *** Compared with echocardiogram of 8/10/2018, no  significant changes noted.  < end of copied text >    ___________________________________________________________________________________  ===============>>  A S S E S S M E N T   A N D   P L A N <<===============  ------------------------------------------------------------------------------------------    · Assessment		  85 y/o F with hx of afib on eliquis, prior CVA, HTN, recent cardiac valve repair presents with dizziness this afternoon. admitted for near syncope    Problem/Plan - 1:  ·  Problem: Dizziness, likely vertigo     pt's valvulopathy / MR may be also contributing ( pt was also dehydrated / over diuresed  tele  monitor vitals, labs  meclizine PRN  cardio appreciated   Continue Current medications otherwise  and monitor.     Problem/Plan - 2:  ·  Problem: ETELVINA , likley due to overdiuresis >> resolved  lasix as above  added  ACEI per cardio     Problem/Plan - 3:  ·  Problem: Hypertension,   cont metoprolol.   lisinopril as above    Problem/Plan - 4:  ·  Problem: Atrial fibrillation.  Plan: cont metoprolol  Eliquis     Problem/Plan - 5:  ·  Problem: CAD, HLD, likely chronic systolic HF, stable   cont statin.   Continue Current medications otherwise and monitor.     Problem/Plan - 6:  Problem: anemia  likely of chronic disease   anemia panel as above  monitor   will give PO Iron supplements    supplement vitamin D deficiency    -GI/DVT Prophylaxis.  - DC planing to assisted living as above   --------------------------------------------  Case discussed with pt  Education given on findings and plan of care  ___________________________  H. JOSE LUIS Connell.  Pager: [phone]

## 2018-12-20 VITALS
DIASTOLIC BLOOD PRESSURE: 73 MMHG | TEMPERATURE: 97 F | HEART RATE: 86 BPM | OXYGEN SATURATION: 100 % | SYSTOLIC BLOOD PRESSURE: 126 MMHG | RESPIRATION RATE: 17 BRPM

## 2018-12-20 RX ADMIN — PANTOPRAZOLE SODIUM 40 MILLIGRAM(S): 20 TABLET, DELAYED RELEASE ORAL at 05:41

## 2018-12-20 RX ADMIN — LISINOPRIL 2.5 MILLIGRAM(S): 2.5 TABLET ORAL at 05:41

## 2018-12-20 RX ADMIN — Medication 50 MILLIGRAM(S): at 05:42

## 2018-12-20 RX ADMIN — APIXABAN 2.5 MILLIGRAM(S): 2.5 TABLET, FILM COATED ORAL at 05:41

## 2018-12-20 RX ADMIN — Medication 40 MILLIGRAM(S): at 05:42

## 2018-12-20 RX ADMIN — SODIUM CHLORIDE 3 MILLILITER(S): 9 INJECTION INTRAMUSCULAR; INTRAVENOUS; SUBCUTANEOUS at 05:41

## 2018-12-20 NOTE — PROGRESS NOTE ADULT - ASSESSMENT
M E D I C A L   A T T E N D I N G    F O L L O W    U P   N O T E                                     ------------------------------------------------------------------------------------------------    patient evaluated by me, case discussed with team, chart, medications, and physical exam reviewed, labs / tests  and vitals reviewed by me, as stacey.   Patient is stable for discharge today to assisted living as planned.  Patient to follow up with  Dr Masters.  See discharge document for full note.      ==================>> MEDICATIONS <<====================    apixaban 2.5 milliGRAM(s) Oral every 12 hours  aspirin enteric coated 81 milliGRAM(s) Oral daily  atorvastatin 10 milliGRAM(s) Oral at bedtime  cholecalciferol 2000 Unit(s) Oral daily  ferrous    sulfate 325 milliGRAM(s) Oral daily  furosemide    Tablet 40 milliGRAM(s) Oral daily  lisinopril 2.5 milliGRAM(s) Oral daily  metoprolol tartrate 50 milliGRAM(s) Oral daily  pantoprazole    Tablet 40 milliGRAM(s) Oral before breakfast  sodium chloride 0.9% lock flush 3 milliLiter(s) IV Push every 8 hours    MEDICATIONS  (PRN):  meclizine 12.5 milliGRAM(s) Oral three times a day PRN Dizziness    ==================>> VITAL SIGNS <<==================    T(C): 36.3 (12-20-18 @ 05:39), Max: 36.6 (12-19-18 @ 19:26)  HR: 86 (12-20-18 @ 05:39) (65 - 86)  BP: 126/73 (12-20-18 @ 05:39) (103/52 - 126/73)  RR: 17 (12-20-18 @ 05:39) (17 - 17)  SpO2: 100% (12-20-18 @ 05:39) (98% - 100%)    I&O's Summary    19 Dec 2018 07:01  -  20 Dec 2018 07:00  --------------------------------------------------------  IN: 125 mL / OUT: 200 mL / NET: -75 mL       ==================>> LAB AND IMAGING <<==================       no labs today     TSH:      2.15   (12-13-18)       ,     3.400   (11-28-18)           Lipid profile:  (12-13-18)     Total: 133     LDL  : 73     HDL  :43     TG   :116     HgA1C: 6.3  (12-13-18)             Creatinine:  1.11  <<==, 1.13  <<==, 1.18  <<==   Sodium:   141  <==, 139  <==, 142  <==      WBC count:   5.59 <<== ,  5.19 <<==    Hemoglobin:   9.4 <<==,  9.5 <<==   platelets:  159 <==, 191 <==, 183 <==

## 2018-12-20 NOTE — PROGRESS NOTE ADULT - SUBJECTIVE AND OBJECTIVE BOX
Cardiovascular Disease Progress Note    Overnight events: No acute events overnight.  lasix increased to 40mg yest. remains euvolemic. no cp/sob/pnd/orthopnea/palps  Otherwise review of systems negative    Objective Findings:  T(C): 36.3 (12-20-18 @ 05:39), Max: 36.6 (12-19-18 @ 19:26)  HR: 86 (12-20-18 @ 05:39) (65 - 86)  BP: 126/73 (12-20-18 @ 05:39) (103/52 - 126/73)  RR: 17 (12-20-18 @ 05:39) (17 - 17)  SpO2: 100% (12-20-18 @ 05:39) (98% - 100%)  Wt(kg): --  Daily     Daily       Physical Exam:  Gen: NAD  HEENT: EOMI  CV: RRR, normal S1 + S2, no m/r/g  Lungs: CTAB  Abd: soft, non-tender  Ext: No edema            Inpatient Medications:  MEDICATIONS  (STANDING):  apixaban 2.5 milliGRAM(s) Oral every 12 hours  aspirin enteric coated 81 milliGRAM(s) Oral daily  atorvastatin 10 milliGRAM(s) Oral at bedtime  cholecalciferol 2000 Unit(s) Oral daily  ferrous    sulfate 325 milliGRAM(s) Oral daily  furosemide    Tablet 40 milliGRAM(s) Oral daily  lisinopril 2.5 milliGRAM(s) Oral daily  metoprolol tartrate 50 milliGRAM(s) Oral daily  pantoprazole    Tablet 40 milliGRAM(s) Oral before breakfast  sodium chloride 0.9% lock flush 3 milliLiter(s) IV Push every 8 hours      Assessment:  -atrial fibrillation on eliquis  -prior CVA  -CAD s/p stents s/p [facility] 11/18 without any new obstructive lesions  -mod-severe MR s/p [facility] with anatomy reportedly suitable for luke clipping/TMVR  -ETELVINA  -dizziness    Plan of Care:  -ETELVINA resolved  -c/w lisinopril 2.5mg po daily  -c/w lasix 40mg to maintain euvolemia  -would change metoprolol to toprol xl 50mg for goal directed medical therapy for systolic HF  -no need for repeat cardiac work up at this time  -per patient wishes, will attempt medical management of her mitral valve disease for now  -can d/c tele  -c/w asa and statin for CAD  -c/w eliquis for afib and hx of stroke  -dispo planning        Over 25 minutes spent on total encounter; more than 50% of the visit was spent counseling and/or coordinating care by the attending physician.      Fernando Masters MD   Cardiovascular Disease  [phone]

## 2019-04-10 NOTE — DIETITIAN INITIAL EVALUATION ADULT. - DOB: +DATEOFBIRTH

## 2019-09-30 NOTE — PATIENT PROFILE ADULT - PATIENT REPRESENTATIVE: ( YOU CAN CHOOSE ANY PERSON THAT CAN ASSIST YOU WITH YOUR HEALTH CARE PREFERENCES, DOES NOT HAVE TO BE A SPOUSE, IMMEDIATE FAMILY OR SIGNIFICANT OTHER/PARTNER)
None    Food insecurity:     Worry: None     Inability: None    Transportation needs:     Medical: None     Non-medical: None   Tobacco Use    Smoking status: Current Some Day Smoker     Packs/day: 0.50     Years: 10.00     Pack years: 5.00     Types: Cigarettes     Last attempt to quit: 2017     Years since quittin.3    Smokeless tobacco: Never Used   Substance and Sexual Activity    Alcohol use: No    Drug use: No    Sexual activity: Yes     Partners: Female   Lifestyle    Physical activity:     Days per week: None     Minutes per session: None    Stress: None   Relationships    Social connections:     Talks on phone: None     Gets together: None     Attends [employer] service: None     Active member of club or organization: None     Attends meetings of clubs or organizations: None     Relationship status: None    Intimate partner violence:     Fear of current or ex partner: None     Emotionally abused: None     Physically abused: None     Forced sexual activity: None   Other Topics Concern    None   Social History Narrative    None        Objective:  Exam:  /73   Pulse 70   Ht 5' 9\" (1.753 m)   Wt 210 lb (95.3 kg)   BMI 31.01 kg/m²   This is a well-nourished patient in no acute distress  Patient is awake, alert and oriented x3. Speech is normal.  Pupils are equal round reacting to light. Extraocular movements intact. Face symmetrical. Tongue midline. Motor exam shows normal symmetrical strength. Deep tendon reflexes normal. Plantar reflexes downgoing. Patient has some pill-rolling rest tremor in the right hand. There is no cogwheel rigidity however. Sensory exam normal. Coordination normal. Gait normal. No carotid bruit. No neck stiffness.       Data :  LABS:  General Labs:    CBC:   Lab Results   Component Value Date    WBC 5.8 2019    RBC 4.70 2019    HGB 14.6 2019    HCT 42.1 2019    MCV 89.6 2019    MCH 31.0 2019    MCHC 34.6 2019 RDW 13.1 09/11/2019     09/11/2019    MPV 9.8 09/11/2019     BMP:    Lab Results   Component Value Date     09/11/2019    K 4.7 09/11/2019    CL 96 09/11/2019    CO2 28 09/11/2019    BUN 18 09/11/2019    LABALBU 4.3 09/11/2019    CREATININE 1.2 09/11/2019    CALCIUM 9.6 09/11/2019    GFRAA >60 09/11/2019    GFRAA >60 04/12/2013    LABGLOM >60 09/11/2019    GLUCOSE 417 09/11/2019   Valproic acid level was low at 83.9  RADIOLOGY REVIEW:  I have reviewed radiology image(s) and reports(s) of:  MRI brain    Impression :  Partial simple seizures  Thrombocytopenia has now resolved. MRI brain was normal  Patient has some rest tremor in the right hand. However there is no cogwheel rigidity or other evidence for Parkinson's disease. Plan :  Discussed with patient  Continue Depakote  mg 3 tablets daily  We discussed about the pill-rolling tremor. We decided to not put him on any medication at this time but he should be closely watch for development of any other Parkinson's disease symptoms. I will see him back in 6 months for follow-up. Please note a portion of  this chart was generated using dragon dictation software. Although every effort was made to ensure the accuracy of this automated transcription, some errors in transcription may have occurred. yes
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
## 2021-01-25 NOTE — PATIENT PROFILE ADULT - ABILITY TO HEAR (WITH HEARING AID OR HEARING APPLIANCE IF NORMALLY USED):
[FreeTextEntry1] : Isabela presents for follow-up visit.  She was last seen in 2019 with diarrhea and was suggested to give a trial of lactose-free diet.  Celiac antibodies were drawn and negative.  She states that she tried lactose-free without improvement in diarrhea.  She has persistent diarrhea occurring at least 3 times a week, 3 diarrheal stools per episode now associated with mucus.  No rectal bleeding.  She has urgency to have bowel movements prior to the diarrhea.  No nausea or vomiting.  She at times has fecal incontinence.  No specific foods appear to aggravate her GI symptoms.  She admits that she is always stressed out and not sure if it is related to this anxiety and stress.  Her last colonoscopy was 2015.  She also has been experiencing back pain and epigastric abdominal pain.  The thought was that she had a kidney stone or back issues.  CT abdomen pelvis was obtained on April 2020 with normal-appearing pancreas, otherwise normal exam.  Blood work from December 2020 without evidence of anemia, normal amylase and lipase, normal liver enzymes.  She was given a trial of omeprazole without improvement in the epigastric abdominal pain.  She has been gaining weight.  She takes Imodium as needed with good response. Adequate: hears normal conversation without difficulty

## 2021-03-18 NOTE — DISCHARGE NOTE ADULT - DO YOU HAVE DIFFICULTY CLIMBING STAIRS
BP: 115-174 SBP here  - c/w metoprolol 150mg qd   - Will hold amlodipine given peripheral edema side effect profile   - CTM BP BP: 115-174 SBP here  - c/w metoprolol 150mg qd   - Will hold amlodipine given peripheral edema side effect profile   - CTM BP BP: 115-174 SBP here  - c/w metoprolol 150mg qd   - Will hold amlodipine given peripheral edema side effect profile   - CTM BP BP: 115-174 SBP here  - c/w metoprolol 150mg qd   - Will hold amlodipine given peripheral edema side effect profile   - CTM BP BP: 115-174 SBP here  - c/w metoprolol 150mg qd   - Will hold amlodipine given peripheral edema side effect profile   - CTM BP BP: 115-174 SBP here  - c/w metoprolol 150mg qd   - Will hold amlodipine given peripheral edema side effect profile   - CTM BP BP: 115-174 SBP here  - c/w metoprolol 150mg qd   - Will hold amlodipine given peripheral edema side effect profile   - CTM BP BP: 115-174 SBP here  - c/w metoprolol 150mg qd   - Will hold amlodipine given peripheral edema side effect profile   - CTM BP Yes

## 2021-08-04 NOTE — H&P ADULT - RS GEN PE MLT RESP DETAILS PC
I reviewed the H&P, I examined the patient, and there are no changes in the patient's condition.     diminished breath sounds, L/rhonchi/diminished breath sounds, R

## 2021-10-31 NOTE — ED ADULT NURSE NOTE - QUALITY
Pt reports being exposed to coworker who tested positive for COVID 1 week ago, patient needs COVID testing to return to work. Pt reports vomiting, diarrhea, body aches 2 days ago, but states those symptoms have resolved.    altered level of consciousness

## 2021-12-23 NOTE — DISCHARGE NOTE ADULT - CARE PLAN
Problem: Postoperative Care  Goal: Elimination status is maintained/returned to baseline  Outcome: Outcome Not Met, Continue to Monitor  Note: CBIs running slow with dilute output, + flatus  Goal: Activity level is resumed to level needed for d/c  Outcome: Outcome Met, Continue evaluating goal progress toward completion  Note: Pt/OT following     Problem: At Risk for Falls  Goal: # Patient does not fall  Outcome: Outcome Met, Continue evaluating goal progress toward completion  Note: Call light appropriate      Principal Discharge DX:	Acute on chronic systolic (congestive) heart failure  Goal:	To relieve and prevent worsening symptoms associated with congestive heart failure, to improve quality of life, and to treat underlying conditions such as coronary heart disease, high blood pressure, or diabetes, and to maintain euvolemia.  Assessment and plan of treatment:	Low salt diet, fluid restriction to 1500 ml daily, monitor your fluid intake and weight daily, exercise as tolerated 30 minutes daily, and follow up with your physician within 1 to 2 weeks.  Secondary Diagnosis:	Chronic atrial fibrillation  Goal:	To restore or maintain a normal heart rate and rhythm, to prevent blood clots, and decrease the risks of stroke CVA/TIA.  Assessment and plan of treatment:	Please take your medications as prescribed.  Continue to take your blood thinner as prescribed and follow with your physician.  Low fat diet, reduce caffeine intake, and exercise at least 30 minutes daily.  Secondary Diagnosis:	Essential hypertension  Goal:	To maintain a normal blood pressure to prevent heart attack, stroke and renal failure.  Assessment and plan of treatment:	Low sodium and fat diet, continue anti-hypertensive medications, and follow up with primary care physician.  Secondary Diagnosis:	HLD (hyperlipidemia)  Goal:	To maintain normal cholesterol levels to prevent stroke, coronary artery disease, peripheral vascular disease and heart attacks.  Assessment and plan of treatment:	Low fat diet, exercise daily and continue current medications. Follow up with primary care physician and cardiologist for management.  Secondary Diagnosis:	Mitral valve insufficiency, unspecified etiology  Assessment and plan of treatment:	Please follow up with Dr. Masters within 1 week and you will need discussion of a mitral valve clip.

## 2022-06-26 NOTE — DIETITIAN INITIAL EVALUATION ADULT. - PROBLEM SELECTOR PROBLEM 3
[facility] [facility]        Pt Name: Lizzeth Arreguin  MRN: [id number]  [place] 1980  Date of evaluation: 6/26/2022  Provider: Anna Finch PA-C  PCP: No primary care provider on file. Shared Visit or Autonomous Visit:  I have seen and evaluated this patient with my supervising physician Glenn Arguelles MD.    [address] [place] [address]       Chief Complaint   Patient presents with    Abdominal Pain     seen at [facility] ER told he was having \"liver issues\" due to make appt with GI doctor, states his skin is turning yellow and abd pain is increasing. HISTORY OF PRESENT ILLNESS   (Location/Symptom, Timing/Onset, Context/Setting, Quality, Duration, Modifying Factors, Severity)  Note limiting factors. Lizzeth Arreguin is a 43 y.o. male presenting to the ER for evaluation of upper abdominal pain, vomiting, skin turning yellow symptoms for started about 2 weeks ago he was seen at [facility] in the ER last week on Thursday had labs and CT scan abdomen diagnosed with autoimmune hepatitis and also had a toothache at that time was prescribed clindamycin and oxycodone states he has been vomiting and not able to keep down his medication having heartburn and not eating very much came in due to continued symptoms. Denies alcohol use. Denies any history of IV drug use. No known fevers. No diarrhea. No black or bloody stools. The history is provided by the patient.    Abdominal Pain  Pain location:  Epigastric and RUQ  Pain quality: aching and burning    Pain radiates to:  Chest  Onset quality:  Gradual  Duration:  2 weeks  Timing:  Constant  Progression:  Worsening  Chronicity:  New  Relieved by:  Nothing  Associated symptoms: chest pain, fatigue, nausea and vomiting    Associated symptoms: no diarrhea, no dysuria, no fever and no shortness of breath          Nursing Notes were reviewed    REVIEW OF SYSTEMS    (2-9 systems for level 4, 10 or more for level 5)     Review of Systems   Constitutional: Positive for fatigue. Negative for fever. Respiratory: Negative for shortness of breath. Cardiovascular: Positive for chest pain. Gastrointestinal: Positive for abdominal pain, nausea and vomiting. Negative for blood in stool and diarrhea. Genitourinary: Negative for dysuria. Decreased urination   All other systems reviewed and are negative. Positives and Pertinent negatives as per HPI. PAST MEDICAL HISTORY     Past Medical History:   Diagnosis Date    Liver damage          SURGICAL HISTORY   No past surgical history on file. CURRENTMEDICATIONS       Previous Medications    No medications on file         ALLERGIES     Other    FAMILYHISTORY     No family history on file. SOCIAL HISTORY       Social History     Socioeconomic History    Marital status: Single     Spouse name: Not on file    Number of children: Not on file    Years of education: Not on file    Highest education level: Not on file   Occupational History    Not on file   Tobacco Use    Smoking status: Current Every Day Smoker    Smokeless tobacco: Never Used   Vaping Use    Vaping Use: Never used   Substance and Sexual Activity    Alcohol use: Yes     Comment: social    Drug use: Yes     Types: Marijuana No Mccormick)    Sexual activity: Not on file   Other Topics Concern    Not on file   Social History Narrative    Not on file     Social Determinants of Health     Financial Resource Strain:     Difficulty of Paying Living Expenses: Not on file   Food Insecurity:     Worried About Running Out of Food in the Last Year: Not on file    [place] of Food in the Last Year: Not on file   Transportation Needs:     Lack of Transportation (Medical): Not on file    Lack of Transportation (Non-Medical):  Not on file   Physical Activity:     Days of Exercise per Week: Not on file    Minutes of Exercise per Session: Not on file   Stress:     Feeling of Stress : Not on file   Social Connections:     Frequency of Communication with Friends and Family: Not on file    Frequency of Social Gatherings with Friends and Family: Not on file    Attends Jain Services: Not on file    Active Member of Clubs or Organizations: Not on file    Attends Club or Organization Meetings: Not on file    Marital Status: Not on file   Intimate Partner Violence:     Fear of Current or Ex-Partner: Not on file    Emotionally Abused: Not on file    Physically Abused: Not on file    Sexually Abused: Not on file   Housing Stability:     Unable to Pay for Housing in the Last Year: Not on file    Number of [place] in the Last Year: Not on file    Unstable Housing in the Last Year: Not on file       SCREENINGS    Roderick Coma Scale  Eye Opening: Spontaneous  Best Verbal Response: Oriented  Best Motor Response: Obeys commands  Roderick Coma Scale Score: 15        PHYSICAL EXAM    (up to 7 for level 4, 8 or more for level 5)     ED Triage Vitals [06/26/22 1552]   BP Temp Temp Source Heart Rate Resp SpO2 Height Weight   109/63 98.5 °F (36.9 °C) Oral 57 16 100 % 5' 7\" (1.702 m) 165 lb (74.8 kg)       Physical Exam  Vitals and nursing note reviewed. Constitutional:       Appearance: He is well-developed. He is not toxic-appearing. HENT:      Head: Normocephalic and atraumatic. Mouth/Throat:      Mouth: Mucous membranes are moist.      Pharynx: Oropharynx is clear. No posterior oropharyngeal erythema. Eyes:      Pupils: Pupils are equal, round, and reactive to light. Comments: Sclericterus   Cardiovascular:      Rate and Rhythm: Normal rate and regular rhythm. Heart sounds: Normal heart sounds. Pulmonary:      Effort: Pulmonary effort is normal. No respiratory distress. Breath sounds: Normal breath sounds. No stridor. No wheezing, rhonchi or rales. Abdominal:      General: Bowel sounds are normal. There is no distension.       Palpations: Abdomen is soft. Abdomen is not rigid. Tenderness: There is abdominal tenderness in the right upper quadrant and epigastric area. There is no guarding or rebound. Musculoskeletal:         General: Normal range of motion. Cervical back: Normal range of motion and neck supple. Skin:     General: Skin is warm and dry. Neurological:      Mental Status: He is alert and oriented to person, place, and time. GCS: GCS eye subscore is 4. GCS verbal subscore is 5. GCS motor subscore is 6. Cranial Nerves: No cranial nerve deficit. Sensory: No sensory deficit. Motor: No abnormal muscle tone. Coordination: Coordination normal.   Psychiatric:         Speech: Speech normal.         Behavior: Behavior normal. Behavior is cooperative. Thought Content:  Thought content normal.         DIAGNOSTIC RESULTS   LABS:    Labs Reviewed   URINALYSIS - Abnormal; Notable for the following components:       Result Value    Color, UA DARK YELLOW (*)     Bilirubin Urine LARGE (*)     All other components within normal limits   CBC WITH AUTO DIFFERENTIAL - Abnormal; Notable for the following components:    RDW 16.4 (*)     All other components within normal limits   COMPREHENSIVE METABOLIC PANEL W/ REFLEX TO MG FOR LOW K - Abnormal; Notable for the following components:    Glucose 111 (*)     CREATININE <0.5 (*)     Total Bilirubin 9.8 (*)     Alkaline Phosphatase 339 (*)     ALT 1,754 (*)      (*)     All other components within normal limits   HEPATIC FUNCTION PANEL - Abnormal; Notable for the following components:    Bilirubin, Direct 7.8 (*)     Bilirubin, Indirect 2.0 (*)     All other components within normal limits   ACETAMINOPHEN LEVEL - Abnormal; Notable for the following components:    Acetaminophen Level <5 (*)     All other components within normal limits   COVID-19, RAPID   LIPASE   TROPONIN   PROTIME-INR   HEPATITIS PANEL, ACUTE     Results for orders placed or performed during the hospital encounter of 06/26/22   COVID-19, Rapid    Specimen: Nasopharyngeal Swab   Result Value Ref Range    SARS-CoV-2, NAAT Not Detected Not Detected   Urinalysis   Result Value Ref Range    Color, UA DARK YELLOW (A) Straw/Yellow    Clarity, UA Clear Clear    Glucose, Ur Negative Negative mg/dL    Bilirubin Urine LARGE (A) Negative    Ketones, Urine Negative Negative mg/dL    Specific Gravity, UA 1.025 1.005 - 1.030    Blood, Urine Negative Negative    pH, UA 6.5 5.0 - 8.0    Protein, UA Negative Negative mg/dL    Urobilinogen, Urine 1.0 <2.0 E.U./dL    Nitrite, Urine Negative Negative    Leukocyte Esterase, Urine Negative Negative    Microscopic Examination Not Indicated     Urine Type NotGiven    CBC with Auto Differential   Result Value Ref Range    WBC 8.2 4.0 - 11.0 K/uL    RBC 4.64 4.20 - 5.90 M/uL    Hemoglobin 14.4 13.5 - 17.5 g/dL    Hematocrit 42.0 40.5 - 52.5 %    MCV 90.5 80.0 - 100.0 fL    MCH 31.1 26.0 - 34.0 pg    MCHC 34.4 31.0 - 36.0 g/dL    RDW 16.4 (H) 12.4 - 15.4 %    Platelets 201 050 - 220 K/uL    MPV 7.8 5.0 - 10.5 fL    Neutrophils % 68.2 %    Lymphocytes % 16.7 %    Monocytes % 9.3 %    Eosinophils % 5.0 %    Basophils % 0.8 %    Neutrophils Absolute 5.6 1.7 - 7.7 K/uL    Lymphocytes Absolute 1.4 1.0 - 5.1 K/uL    Monocytes Absolute 0.8 0.0 - 1.3 K/uL    Eosinophils Absolute 0.4 0.0 - 0.6 K/uL    Basophils Absolute 0.1 0.0 - 0.2 K/uL   Comprehensive Metabolic Panel w/ Reflex to MG   Result Value Ref Range    Sodium 137 136 - 145 mmol/L    Potassium reflex Magnesium 3.8 3.5 - 5.1 mmol/L    Chloride 104 99 - 110 mmol/L    CO2 23 21 - 32 mmol/L    Anion Gap 10 3 - 16    Glucose 111 (H) 70 - 99 mg/dL    BUN 9 7 - 20 mg/dL    CREATININE <0.5 (L) 0.9 - 1.3 mg/dL    GFR Non-African American >60 >60    GFR African American >60 >60    Calcium 8.9 8.3 - 10.6 mg/dL    Total Protein 6.6 6.4 - 8.2 g/dL    Albumin 3.8 3.4 - 5.0 g/dL    Albumin/Globulin Ratio 1.4 1.1 - 2.2    Total Bilirubin 9.8 (H) 0.0 - 1.0 mg/dL    Alkaline Phosphatase 339 (H) 40 - 129 U/L    ALT 1,754 (H) 10 - 40 U/L     (H) 15 - 37 U/L   Hepatic Function Panel   Result Value Ref Range    Bilirubin, Direct 7.8 (H) 0.0 - 0.3 mg/dL    Bilirubin, Indirect 2.0 (H) 0.0 - 1.0 mg/dL   Lipase   Result Value Ref Range    Lipase 33.0 13.0 - 60.0 U/L   Troponin   Result Value Ref Range    Troponin <0.01 <0.01 ng/mL   Protime-INR   Result Value Ref Range    Protime 13.9 11.7 - 14.5 sec    INR 1.08 0.87 - 1.14   Acetaminophen Level   Result Value Ref Range    Acetaminophen Level <5 (L) 10 - 30 ug/mL   EKG 12 Lead   Result Value Ref Range    Ventricular Rate 45 BPM    Atrial Rate 45 BPM    P-R Interval 158 ms    QRS Duration 108 ms    Q-T Interval 432 ms    QTc Calculation (Bazett) 373 ms    P Axis 34 degrees    R Axis 77 degrees    T Axis 34 degrees    Diagnosis       Sinus bradycardiaPossible Anterior infarct , age undeterminedAbnormal ECGNo previous ECGs available       All other labs were within normal range or not returned as of this dictation. EKG: All EKG's are interpreted by the Emergency Department Physician in the absence of a cardiologist.  Please see their note for interpretation of EKG. RADIOLOGY:   Non-plain film images such as CT, Ultrasound and MRI are read by the radiologist. Plain radiographic images are visualized andpreliminarily interpreted by the  ED Provider with the below findings:        Interpretation perthe Radiologist below, if available at the time of this note:    XR CHEST PORTABLE   Final Result   No acute findings in the chest.           XR CHEST PORTABLE    Result Date: 6/26/2022  EXAMINATION: ONE XRAY VIEW OF THE CHEST 6/26/2022 4:30 pm COMPARISON: None HISTORY: ORDERING SYSTEM PROVIDED HISTORY: chest pain TECHNOLOGIST PROVIDED HISTORY: Reason for exam:->chest pain Reason for Exam: Chest pain FINDINGS: Calcified granulomas in the mid left lung. Otherwise clear lungs.   No definite findings of pneumothorax or pleural effusion. Normal mediastinal, hilar, and cardiac contours. No obvious acute fracture. Joints maintain anatomic alignment. No acute findings in the chest.         PROCEDURES   Unless otherwise noted below, none     Procedures    CRITICAL CARE TIME   Critical care provided for this patient of which 0 min were spend on critical care and decision making. 0 min spent on procedures. There was imminent failure of an organ system which required critical intervention to prevent clinically significant progression of life threatening deterioration of the patient's condition to the point of disability or death. CONSULTS:  IP CONSULT TO HOSPITALIST  IP CONSULT TO GI      EMERGENCY DEPARTMENT COURSE and DIFFERENTIAL DIAGNOSIS/MDM:   Vitals:    Vitals:    06/26/22 1552 06/26/22 1901   BP: 109/63 113/72   Pulse: 57 59   Resp: 16 16   Temp: 98.5 °F (36.9 °C)    TempSrc: Oral    SpO2: 100% 100%   Weight: 165 lb (74.8 kg)    Height: 5' 7\" (1.702 m)        Patient was given thefollowing medications:  Medications   0.9 % sodium chloride bolus (0 mLs IntraVENous Stopped 6/26/22 1748)   ondansetron (ZOFRAN) injection 4 mg (4 mg IntraVENous Given 6/26/22 1637)   famotidine (PEPCID) injection 20 mg (20 mg IntraVENous Given 6/26/22 1637)       Is this patient to be included in the SEP-1 Core Measure due to severe sepsis or septic shock? No   Exclusion criteria - the patient is NOT to be included for SEP-1 Core Measure due to: Infection is not suspected       Records received from [facility] patient seen on 6/23/2022  CT scan abdomen pelvis without contrast the liver is mildly enlarged and diffusely fatty infiltrated. Gallbladder is contracted. Normal caliber common bile duct. Spleen is mildly enlarged. Pancreas is unremarkable. Appendix is unremarkable. Mildly prominent prostate. Labs on 6/23/2022 WBC 8.7. Hemoglobin 14.5. Platelet 359. Electrolytes were normal.  Globulin 3.6. Total bilirubin 8.3. Alkaline phosphatase 337. ALT 1953. AST 1024. Acute viral hepatitis panel hepatitis A antibody IgM negative. Hepatitis B antigen screen is positive. Hepatitis B core antibody IgM is positive. Hepatitis C antibody less than 0.1. ED course  Patient presented to the ER for evaluation of abdominal pain vomiting and yellowing of his eyes. Was seen in the ER 3 days ago and evaluated told elevated LFTs and need for follow-up with GI. Still having abdominal pain vomiting now also eyes turning yellow came here for evaluation. On exam he has tenderness epigastrium and right upper quadrant. No rebound or guarding. Normal bowel sounds. He does have scleral icterus on exam.  Today's labs show bilirubin is a little higher at 9.8. Alk phos a little higher 339. ALT 1754 and  have come down some. Lipase is normal.  Due to his continued symptoms recommended admission. 5:46 PM EDT spoke with Dr Falguni Muniz in agreement with admission to [facility]. Spoke with hospitalist accepts patient for transfer. He is stable. FINAL IMPRESSION      1. Acute hepatitis    2. Scleral icterus    3. Upper abdominal pain    4. Vomiting in adult    5. Transaminitis          DISPOSITION/PLAN   DISPOSITION     PATIENT REFERREDTO:  No follow-up provider specified.     DISCHARGE MEDICATIONS:  New Prescriptions    No medications on file       DISCONTINUED MEDICATIONS:  Discontinued Medications    No medications on file              (Please note that portions ofthis note were completed with a voice recognition program.  Efforts were made to edit the dictations but occasionally words are mis-transcribed.)    Nerissa Barnett PA-C (electronically signed)            Kaela Sanchez PA-C  06/26/22 1941 Chronic atrial fibrillation

## 2022-12-20 NOTE — ED ADULT TRIAGE NOTE - BP NONINVASIVE SYSTOLIC (MM HG)
Karlee Lares was contacted by Unique Motley MA, a Laya Stallworth, regarding a Social Determinants of Health referral.     A message was left with the writer's contact information. Patient left voicemail for me Monday afternoon 12/19/22. He received a delivery of food on Monday about 3:30pm.    I called Patient back today and left voicemail. I let him know there are additional food delivery programs that may be more frequent, if he's interested. 154

## 2023-03-22 NOTE — ED PROVIDER NOTE - CPE EDP MUSC NORM
normal... Detail Level: Detailed Depth Of Biopsy: dermis Was A Bandage Applied: Yes Size Of Lesion In Cm: 0 Biopsy Type: H and E Biopsy Method: Dermablade Anesthesia Type: 1% lidocaine with epinephrine Anesthesia Volume In Cc: 0.5 Hemostasis: Electrocautery Wound Care: Petrolatum Dressing: bandage Destruction After The Procedure: No Type Of Destruction Used: Curettage Curettage Text: The wound bed was treated with curettage after the biopsy was performed. Cryotherapy Text: The wound bed was treated with cryotherapy after the biopsy was performed. Electrodesiccation Text: The wound bed was treated with electrodesiccation after the biopsy was performed. Electrodesiccation And Curettage Text: The wound bed was treated with electrodesiccation and curettage after the biopsy was performed. Silver Nitrate Text: The wound bed was treated with silver nitrate after the biopsy was performed. Lab: [id number] Lab Facility: [id number] Consent: Written consent was obtained and risks were reviewed including but not limited to scarring, infection, bleeding, scabbing, incomplete removal, nerve damage and allergy to anesthesia. Post-Care Instructions: I reviewed with the patient in detail post-care instructions. Patient is to keep the biopsy site dry overnight, and then apply bacitracin twice daily until healed. Patient may apply hydrogen peroxide soaks to remove any crusting. Notification Instructions: Patient will be notified of biopsy results. However, patient instructed to call the office if not contacted within 2 weeks. Billing Type: Third-Party Bill Information: Selecting Yes will display possible errors in your note based on the variables you have selected. This validation is only offered as a suggestion for you. PLEASE NOTE THAT THE VALIDATION TEXT WILL BE REMOVED WHEN YOU FINALIZE YOUR NOTE. IF YOU WANT TO FAX A PRELIMINARY NOTE YOU WILL NEED TO TOGGLE THIS TO 'NO' IF YOU DO NOT WANT IT IN YOUR FAXED NOTE.

## 2023-05-30 NOTE — DISCHARGE NOTE ADULT - CARE PROVIDER_API CALL
yes
Gino Masters), Cardiovascular Disease; Internal Medicine  [address] [place] [address]  Phone: [phone]  Fax: [phone]

## 2023-07-10 NOTE — ED PROVIDER NOTE - NS ED MD DISPO DIVISION
VTAMA Counseling: I discussed with the patient that VTAMA is not for use in the eyes, mouth or mouth. They should call the office if they develop any signs of allergic reactions to VTAMA. The patient verbalized understanding of the proper use and possible adverse effects of VTAMA.  All of the patient's questions and concerns were addressed. [place]

## 2023-10-17 NOTE — ED ADULT TRIAGE NOTE - TEMPERATURE IN FAHRENHEIT (DEGREES F)
Patient notified of results via the patient portal, normal letter also sent to patient's address on file.   
Renal panel came back within normal limits.  Continue with spironolactone.  
98.9

## 2023-12-14 NOTE — ED ADULT TRIAGE NOTE - TEMPERATURE IN CELSIUS (DEGREES C)
-- DO NOT REPLY / DO NOT REPLY ALL --  -- Message is from Engagement Center Operations (ECO) --    General Patient Message: Patient significant other is calling because patient is currently at [facility] and they notified him that FMLA paperwork has to be filled out by primary. He will be dropping off paperwork tomorrow around 3:00-Critical access hospital    Caller Information         Type Contact Phone/Fax    12/14/2023 02:32 PM CST Phone (Incoming) Tico peres (Emergency Contact) [phone]          Alternative phone number: NA    Can a detailed message be left? Yes    Message Turnaround: [place]:    Refer to site's KB page for routing instructions    Please give this turnaround time to the caller:   \"You can expect to receive a response 2-3 business days after your provider's clinical team reviews the message\"               36.7

## 2024-11-12 NOTE — PHYSICAL THERAPY INITIAL EVALUATION ADULT - FOLLOWS COMMANDS/ANSWERS QUESTIONS, REHAB EVAL
Detail Level: Zone able to follow multistep instructions/100% of the time Detail Level: Generalized Detail Level: Detailed Detail Level: Simple

## 2024-12-16 NOTE — ED ADULT NURSE NOTE - PRO INTERPRETER NEED 2
English
Bed in lowest position, wheels locked, appropriate side rails in place/Call bell, personal items and telephone in reach/Instruct patient to call for assistance before getting out of bed or chair/Non-slip footwear when patient is out of bed/Pax to call system/Physically safe environment - no spills, clutter or unnecessary equipment/Purposeful Proactive Rounding/Room/bathroom lighting operational, light cord in reach
